# Patient Record
Sex: FEMALE | Race: WHITE | Employment: OTHER | URBAN - METROPOLITAN AREA
[De-identification: names, ages, dates, MRNs, and addresses within clinical notes are randomized per-mention and may not be internally consistent; named-entity substitution may affect disease eponyms.]

---

## 2017-06-22 PROBLEM — F43.21 UNRESOLVED GRIEF: Status: ACTIVE | Noted: 2017-06-22

## 2017-10-03 PROBLEM — F33.1 MODERATE EPISODE OF RECURRENT MAJOR DEPRESSIVE DISORDER (HCC): Status: ACTIVE | Noted: 2017-10-03

## 2017-10-03 PROBLEM — F43.22 ADJUSTMENT DISORDER WITH ANXIOUS MOOD: Status: ACTIVE | Noted: 2017-10-03

## 2018-02-25 PROBLEM — E66.9 OBESITY, CLASS I, BMI 30-34.9: Status: ACTIVE | Noted: 2018-02-25

## 2018-02-25 PROBLEM — F33.41 RECURRENT MAJOR DEPRESSIVE DISORDER, IN PARTIAL REMISSION (HCC): Status: ACTIVE | Noted: 2018-02-25

## 2018-05-17 PROBLEM — E66.3 OVERWEIGHT (BMI 25.0-29.9): Status: ACTIVE | Noted: 2018-02-25

## 2019-11-15 ENCOUNTER — HOSPITAL ENCOUNTER (OUTPATIENT)
Dept: SURGERY | Age: 74
Discharge: HOME | End: 2019-11-15

## 2019-11-15 VITALS — BODY MASS INDEX: 30.82 KG/M2 | HEIGHT: 65 IN | WEIGHT: 185 LBS

## 2019-11-15 RX ORDER — METOPROLOL TARTRATE 25 MG/1
12.5 TABLET, FILM COATED ORAL EVERY 12 HOURS
COMMUNITY

## 2019-11-15 RX ORDER — FUROSEMIDE 20 MG/1
20 TABLET ORAL
COMMUNITY
End: 2021-02-16 | Stop reason: CLARIF

## 2019-11-15 RX ORDER — POTASSIUM CHLORIDE 750 MG/1
10 TABLET, FILM COATED, EXTENDED RELEASE ORAL
COMMUNITY

## 2019-11-15 NOTE — PERIOP NOTES
Patient verified name and . Order for consent not found in EHR. Type 2 surgery, PAT phone assessment complete. Orders not received. Labs per surgeon: no orders received. Labs per anesthesia protocol: Hgb, K+, creatinine to be done at appointment on 19  EKG: to be done at appointment on 19    Pt instructed to come to entrance A on 19 at 11:45 for lab work and EKG. Pt voiced an understanding. Last PCP office note dated 10/29/19 found in care everywhere if needed for anesthesia reference. Patient answered medical/surgical history questions at their best of ability. All prior to admission medications documented in [facility] Care. Patient instructed to take the following medications the day of surgery according to anesthesia guidelines with a small sip of water: xanax, buspar, aspirin, aricept, prozac, gabapentin, Norco if needed, synthroid, metoprolol and prilosec. Hold all vitamins/suppllements 7 days prior to surgery and NSAIDS 5 days prior to surgery. Prescription meds to hold:celebrex. Patient instructed on the following:  Arrive at A Entrance, time of arrival to be called the day before by 1700  NPO after midnight including gum, mints, and ice chips  Responsible adult must drive patient to the hospital, stay during surgery, and patient will need supervision 24 hours after anesthesia  Use antibacterial soap in shower the night before surgery and on the morning of surgery  All piercings must be removed prior to arrival.    Leave all valuables (money and jewelry) at home but bring insurance card and ID on  DOS. Do not wear make-up, nail polish, lotions, cologne, perfumes, powders, or oil on skin. Patient teach back successful and patient demonstrates knowledge of instruction.

## 2019-11-20 ENCOUNTER — HOSPITAL ENCOUNTER (OUTPATIENT)
Dept: SURGERY | Age: 74
Discharge: HOME | End: 2019-11-20

## 2019-11-20 NOTE — PERIOP NOTES
Pt no show for lab and EKG appt loulou for 1145 today . Message left on home # to return call to PAT to reschedule.

## 2021-02-16 ENCOUNTER — HOME HEALTH ADMISSION (OUTPATIENT)
Dept: HOME HEALTH SERVICES | Facility: HOME HEALTH | Age: 76
End: 2021-02-16
Payer: MEDICARE

## 2021-02-16 ENCOUNTER — HOSPITAL ENCOUNTER (OUTPATIENT)
Dept: PHYSICAL THERAPY | Age: 76
Discharge: HOME | End: 2021-02-16
Attending: ORTHOPAEDIC SURGERY
Payer: MEDICARE

## 2021-02-16 ENCOUNTER — HOSPITAL ENCOUNTER (OUTPATIENT)
Dept: SURGERY | Age: 76
Discharge: HOME | End: 2021-02-16
Attending: ORTHOPAEDIC SURGERY
Payer: MEDICARE

## 2021-02-16 LAB
ANION GAP SERPL CALC-SCNC: 3 MMOL/L (ref 7–16)
APTT PPP: 24.6 SEC (ref 24.1–35.1)
ATRIAL RATE: 62 BPM
BACTERIA SPEC CULT: ABNORMAL
BASOPHILS # BLD: 0.1 K/UL (ref 0–0.2)
BASOPHILS NFR BLD: 1 % (ref 0–2)
BUN SERPL-MCNC: 22 MG/DL (ref 8–23)
CALCIUM SERPL-MCNC: 9.1 MG/DL (ref 8.3–10.4)
CALCULATED P AXIS, ECG09: 62 DEGREES
CALCULATED R AXIS, ECG10: 10 DEGREES
CALCULATED T AXIS, ECG11: 59 DEGREES
CHLORIDE SERPL-SCNC: 109 MMOL/L (ref 98–107)
CO2 SERPL-SCNC: 31 MMOL/L (ref 21–32)
CREAT SERPL-MCNC: 0.96 MG/DL (ref 0.6–1)
DIAGNOSIS, 93000: NORMAL
DIFFERENTIAL METHOD BLD: NORMAL
EOSINOPHIL # BLD: 0.2 K/UL (ref 0–0.8)
EOSINOPHIL NFR BLD: 4 % (ref 0.5–7.8)
ERYTHROCYTE [DISTWIDTH] IN BLOOD BY AUTOMATED COUNT: 12.3 % (ref 11.9–14.6)
EST. AVERAGE GLUCOSE BLD GHB EST-MCNC: 117 MG/DL
GLUCOSE SERPL-MCNC: 101 MG/DL (ref 65–100)
HBA1C MFR BLD: 5.7 % (ref 4.2–6.3)
HCT VFR BLD AUTO: 39.4 % (ref 35.8–46.3)
HGB BLD-MCNC: 12.8 G/DL (ref 11.7–15.4)
IMM GRANULOCYTES # BLD AUTO: 0 K/UL (ref 0–0.5)
IMM GRANULOCYTES NFR BLD AUTO: 0 % (ref 0–5)
INR PPP: 1.1
LYMPHOCYTES # BLD: 1.7 K/UL (ref 0.5–4.6)
LYMPHOCYTES NFR BLD: 26 % (ref 13–44)
MCH RBC QN AUTO: 30.4 PG (ref 26.1–32.9)
MCHC RBC AUTO-ENTMCNC: 32.5 G/DL (ref 31.4–35)
MCV RBC AUTO: 93.6 FL (ref 79.6–97.8)
MONOCYTES # BLD: 0.5 K/UL (ref 0.1–1.3)
MONOCYTES NFR BLD: 7 % (ref 4–12)
NEUTS SEG # BLD: 4 K/UL (ref 1.7–8.2)
NEUTS SEG NFR BLD: 62 % (ref 43–78)
NRBC # BLD: 0 K/UL (ref 0–0.2)
P-R INTERVAL, ECG05: 142 MS
PLATELET # BLD AUTO: 212 K/UL (ref 150–450)
PMV BLD AUTO: 12 FL (ref 9.4–12.3)
POTASSIUM SERPL-SCNC: 3.9 MMOL/L (ref 3.5–5.1)
PROTHROMBIN TIME: 14.6 SEC (ref 12.5–14.7)
Q-T INTERVAL, ECG07: 428 MS
QRS DURATION, ECG06: 88 MS
QTC CALCULATION (BEZET), ECG08: 434 MS
RBC # BLD AUTO: 4.21 M/UL (ref 4.05–5.2)
SERVICE CMNT-IMP: ABNORMAL
SODIUM SERPL-SCNC: 143 MMOL/L (ref 136–145)
VENTRICULAR RATE, ECG03: 62 BPM
WBC # BLD AUTO: 6.5 K/UL (ref 4.3–11.1)

## 2021-02-16 PROCEDURE — 80048 BASIC METABOLIC PNL TOTAL CA: CPT

## 2021-02-16 PROCEDURE — 97161 PT EVAL LOW COMPLEX 20 MIN: CPT

## 2021-02-16 PROCEDURE — 85025 COMPLETE CBC W/AUTO DIFF WBC: CPT

## 2021-02-16 PROCEDURE — 85730 THROMBOPLASTIN TIME PARTIAL: CPT

## 2021-02-16 PROCEDURE — 93005 ELECTROCARDIOGRAM TRACING: CPT

## 2021-02-16 PROCEDURE — 83036 HEMOGLOBIN GLYCOSYLATED A1C: CPT

## 2021-02-16 PROCEDURE — 85610 PROTHROMBIN TIME: CPT

## 2021-02-16 PROCEDURE — 87641 MR-STAPH DNA AMP PROBE: CPT

## 2021-02-16 PROCEDURE — 77030027138 HC INCENT SPIROMETER -A

## 2021-02-16 PROCEDURE — 36415 COLL VENOUS BLD VENIPUNCTURE: CPT

## 2021-02-16 RX ORDER — FUROSEMIDE 40 MG/1
40 TABLET ORAL
COMMUNITY
End: 2021-11-02

## 2021-02-16 RX ORDER — GABAPENTIN 800 MG/1
800 TABLET ORAL 3 TIMES DAILY
Status: ON HOLD | COMMUNITY
End: 2021-11-02 | Stop reason: SDUPTHER

## 2021-02-16 RX ORDER — LOSARTAN POTASSIUM 25 MG/1
25 TABLET ORAL DAILY
COMMUNITY

## 2021-02-16 RX ORDER — CHOLECALCIFEROL (VITAMIN D3) 125 MCG
10 CAPSULE ORAL
COMMUNITY

## 2021-02-16 RX ORDER — PRAVASTATIN SODIUM 80 MG/1
80 TABLET ORAL
COMMUNITY

## 2021-02-16 RX ORDER — HYDROCODONE BITARTRATE AND ACETAMINOPHEN 7.5; 325 MG/1; MG/1
1 TABLET ORAL
COMMUNITY
End: 2021-03-13

## 2021-02-16 RX ORDER — FENOFIBRIC ACID 135 MG/1
135 CAPSULE, DELAYED RELEASE ORAL DAILY
COMMUNITY

## 2021-02-16 NOTE — PERIOP NOTES
How to Use Your Incentive Spirometer (10 breaths twice a day starting at least a week prior to surgery)       About Your Incentive Spirometer  An incentive spirometer is a device that will expand your lungs by helping you to breathe more deeply and fully. The parts of your incentive spirometer are labeled in Figure 1. Using your incentive spirometer  When youre using your incentive spirometer, make sure to breathe through your mouth. If you breathe through your nose, the incentive spirometer wont work properly. You can hold your nose if you have trouble. DO NOT BLOW INTO THE DEVICE. If you feel dizzy at any time, stop and rest. Try again at a later time. 1. Sit upright in a chair or in bed. Hold the incentive spirometer at eye level. 2. Put the mouthpiece in your mouth and close your lips tightly around it. Slowly breathe out (exhale) completely. 3. Breathe in (inhale) slowly through your mouth as deeply as you can. As you take the breath, you will see the piston rise inside the large column. While the piston rises, the indicator on the right should move upwards. It should stay in between the 2 arrows (see Figure 1). 4. Try to get the piston as high as you can, while keeping the indicator between the arrows. If the indicator doesnt stay between the arrows, youre breathing either too fast or too slow. 5. When you get it as high as you can, hold your breath for 10 seconds, or as long as possible. While youre holding your breath, the piston will slowly fall to the base of the spirometer. 6. Once the piston reaches the bottom of the spirometer, breathe out slowly through your mouth. Rest for a few seconds. 7. Repeat 10 times. Try to get the piston to the same level with each breath. 8. After each set of 10 breaths, try to cough as coughing will help loosen or clear any mucus in your lungs. 9. Put the marker at the level the piston reached on your incentive spirometer.  This will be your goal next time.  Repeat these steps every hour that youre awake.   Cover the mouthpiece of the incentive spirometer when you arent using it

## 2021-02-16 NOTE — PERIOP NOTES
PLEASE CONTINUE TAKING ALL PRESCRIPTION MEDICATIONS UP TO THE DAY OF SURGERY UNLESS OTHERWISE DIRECTED BELOW. DISCONTINUE all vitamins and supplements 21 days prior to surgery. DISCONTINUE Non-Steriodal Anti-Inflammatory (NSAIDS) such as Advil, Ibuprofen, Motrin, Aspirin, Naproxen, and Aleve FIVE days prior to surgery. Home Medications to take  the day of surgery (3/11/21)     1-Xanax if needed   2-Buspirone  3-Fluoxetine  4-Gabapentin    5-Levothyroxine  6-Metoprolol              Home Medications   to Hold     Donepezil=stop 2 weeks prior (last dose on 2/24/21)        Comments   Covid test 3/4/21 @ [address], Virk TracichesterMay take Tylenol up until the day before surgery if needed   *Visitor policy of 1 visitor per patient discussed. Bring: Jodie-hex soap, Incentive Spirometer, Photo ID, Insurance card, Fenofibric        Please do not bring home medications with you on the day of surgery unless otherwise directed by your nurse. If you are instructed to bring home medications, please give them to your nurse as they will be administered by the nursing staff. If you have any questions, please call Keyona Adame [phone]. A copy of this note was provided to the patient for reference.

## 2021-02-16 NOTE — PERIOP NOTES
Patient verified name and . Patient's son is with her today. Order for consent found in EHR and matches case posting; patient verified. Type 3 surgery, walk in joint camp assessment complete. Labs per surgeon: CBC, BMP, PT/PTT, HGB-A1C ; results within anesthesia protocols. Labs per anesthesia protocol: no additional labs needed. EKG: completed today; within anesthesia protocols. PCP note (2020), [facility] note (16), and Echo (16) available in EHR for reference if needed. Patient COVID test date 3/4/21; Patient aware. The testing center [address] provided to the patient. MRSA/MSSA swab collected; pharmacy to review and dose antibiotic as appropriate. Hospital approved surgical skin cleanser and instructions to return bottle on DOS given per hospital policy. Patient provided with handouts including Guide to Surgery, Pain Management, Hand Hygiene, Blood Transfusion Education, and [facility] Brochure. Patient answered medical/surgical history questions at their best of ability. All prior to admission medications documented in [facility]. Original medication prescription bottles visualized during patient appointment. Patient instructed to hold all vitamins 3 weeks prior to surgery and NSAIDS 5 days prior to surgery. Patient teach back successful and patient demonstrates knowledge of instruction.

## 2021-02-16 NOTE — PROGRESS NOTES
Joint Camp Case Management note:  Patient screened in Prehab for discharge planning needs. Patient scheduled for a future total joint replacement. We discussed Home Health and equipment needed after surgery. List of Home Health providers offered. Patient w/o preference towards provider. Initial referral sent to [facility]. Has a walker but will need a 3-1 BSC.

## 2021-02-16 NOTE — PERIOP NOTES
Jeff Aponte MD     Your patient recently had labs drawn during a hospital appointment due to an upcoming surgery. The results are attached. If you have any questions or concerns please reach out to your patient for a follow-up in your office. Please do not respond to this message as my mailbox is not monitored. You may call [phone] with questions or concerns. Recent Results (from the past 12 hour(s))   CBC WITH AUTOMATED DIFF    Collection Time: 02/16/21 11:03 AM   Result Value Ref Range    WBC 6.5 4.3 - 11.1 K/uL    RBC 4.21 4.05 - 5.2 M/uL    HGB 12.8 11.7 - 15.4 g/dL    HCT 39.4 35.8 - 46.3 %    MCV 93.6 79.6 - 97.8 FL    MCH 30.4 26.1 - 32.9 PG    MCHC 32.5 31.4 - 35.0 g/dL    RDW 12.3 11.9 - 14.6 %    PLATELET 977 824 - 397 K/uL    MPV 12.0 9.4 - 12.3 FL    ABSOLUTE NRBC 0.00 0.0 - 0.2 K/uL    DF AUTOMATED      NEUTROPHILS 62 43 - 78 %    LYMPHOCYTES 26 13 - 44 %    MONOCYTES 7 4.0 - 12.0 %    EOSINOPHILS 4 0.5 - 7.8 %    BASOPHILS 1 0.0 - 2.0 %    IMMATURE GRANULOCYTES 0 0.0 - 5.0 %    ABS. NEUTROPHILS 4.0 1.7 - 8.2 K/UL    ABS. LYMPHOCYTES 1.7 0.5 - 4.6 K/UL    ABS. MONOCYTES 0.5 0.1 - 1.3 K/UL    ABS. EOSINOPHILS 0.2 0.0 - 0.8 K/UL    ABS. BASOPHILS 0.1 0.0 - 0.2 K/UL    ABS. IMM.  GRANS. 0.0 0.0 - 0.5 K/UL   HEMOGLOBIN A1C WITH EAG    Collection Time: 02/16/21 11:03 AM   Result Value Ref Range    Hemoglobin A1c 5.7 4.20 - 6.30 %    Est. average glucose 259 mg/dL   METABOLIC PANEL, BASIC    Collection Time: 02/16/21 11:03 AM   Result Value Ref Range    Sodium 143 136 - 145 mmol/L    Potassium 3.9 3.5 - 5.1 mmol/L    Chloride 109 (H) 98 - 107 mmol/L    CO2 31 21 - 32 mmol/L    Anion gap 3 (L) 7 - 16 mmol/L    Glucose 101 (H) 65 - 100 mg/dL    BUN 22 8 - 23 MG/DL    Creatinine 0.96 0.6 - 1.0 MG/DL    GFR est AA >60 >60 ml/min/1.73m2    GFR est non-AA >60 >60 ml/min/1.73m2    Calcium 9.1 8.3 - 10.4 MG/DL   PROTHROMBIN TIME + INR    Collection Time: 02/16/21 11:03 AM   Result Value Ref Range    Prothrombin time 14.6 12.5 - 14.7 sec    INR 1.1     PTT    Collection Time: 02/16/21 11:03 AM   Result Value Ref Range    aPTT 24.6 24.1 - 35.1 SEC   EKG, 12 LEAD, INITIAL    Collection Time: 02/16/21  1:07 PM   Result Value Ref Range    Ventricular Rate 62 BPM    Atrial Rate 62 BPM    P-R Interval 142 ms    QRS Duration 88 ms    Q-T Interval 428 ms    QTC Calculation (Bezet) 434 ms    Calculated P Axis 62 degrees    Calculated R Axis 10 degrees    Calculated T Axis 59 degrees    Diagnosis       Normal sinus rhythm  Nonspecific T wave abnormality  Abnormal ECG  When compared with ECG of 02-AUG-2010 12:51,  Vent.  rate has decreased BY  30 BPM

## 2021-02-16 NOTE — ADVANCED PRACTICE NURSE
Total Joint Surgery Preoperative Chart Review      Patient ID:  Johnathan Piper  [id number]  56 y.o.  1945  Surgeon: Dr. Zachary Barahona  Date of Surgery: 3/11/2021  Procedure: Total Left Hip Arthroplasty  Primary Care Physician: Stefani, Not On File None  Specialty Physician(s):      Subjective:   Johnathan Piper is a 67 y.o. WHITE OR  female who presents for preoperative evaluation for Total Left Hip arthroplasty. This is a preoperative chart review note based on data collected by the nurse at the surgical Pre-Assessment visit. Past Medical History:   Diagnosis Date    Arthritis     osteo-- all over    Atrial fibrillation ([facility] 2016    per cardio note dated 8/16/16= had a single episode of paroxysmal atrial fibrillation in hospital under stress. No recurrence. CHADS2-vasc of 1. NO change in Rx. Continue BBlocker. No need for anticoag.  BMI 31.0-31.9,adult     Cataract of both eyes     CHF (congestive heart failure) ([facility]     Per PCP office note dated 12/08/2020;  lasix as needed    Chronic pain     back pain    Dementia ([facility]     per PCP office note dated 12/08/2020, aricept daily. Pt states she tends to forget names and has trouble remenbering names of certain items.     Generalized anxiety disorder 11/17/2016    GERD (gastroesophageal reflux disease)     hiatal hernia, managed with otc meds prn     High blood pressure     on med for control     High cholesterol     on med for control    Insomnia secondary to anxiety 11/17/2016    Irregular heart beat     Left knee pain     Major depressive disorder, recurrent ([facility] 11/17/2016    managed with meds     Neuropathy     Bilateral feet     Poor historian     Prediabetes     no meds or bs checks at home; A1C=5.7 on 02/16/21    PUD (peptic ulcer disease)     hiatal hernia; denies ulcers     Thyroid disease     hypo; on med for control     Vitamin B 12 deficiency       Past Surgical History:   Procedure Laterality Date    HX BACK SURGERY      spinal cord stimulator placed and then removed     HX HYSTERECTOMY      HX KNEE REPLACEMENT Right     HX KNEE REPLACEMENT Left     HX LAP CHOLECYSTECTOMY       Family History   Problem Relation Age of Onset    Cancer Mother     Heart Attack Father     Hypertension Father     Kidney Disease Father     Tuberculosis Father       Social History     Tobacco Use    Smoking status: Never Smoker    Smokeless tobacco: Never Used   Substance Use Topics    Alcohol use: No       Prior to Admission medications    Medication Sig Start Date End Date Taking? Authorizing Provider   gabapentin (NEURONTIN) 800 mg tablet Take 800 mg by mouth three (3) times daily. Yes Provider, Historical   losartan (COZAAR) 25 mg tablet Take 25 mg by mouth daily. Indications: high blood pressure   Yes Provider, Historical   pravastatin (PRAVACHOL) 80 mg tablet Take 80 mg by mouth nightly. Yes Provider, Historical   HYDROcodone-acetaminophen (Norco) 7.5-325 mg per tablet Take 1 Tab by mouth three (3) times daily as needed for Pain. Indications: pain   Yes Provider, Historical   melatonin 5 mg tablet Take 10 mg by mouth nightly. Yes Provider, Historical   furosemide (Lasix) 40 mg tablet Take 40 mg by mouth daily as needed. Yes Provider, Historical   fenofibric acid (TRILIPIX ER) 135 mg capsule Take 135 mg by mouth daily. Yes Provider, Historical   cholecalciferol, vitamin D3, (VITAMIN D3 PO) Take 1 Tab by mouth daily. Yes Provider, Historical   busPIRone (BUSPAR) 10 mg tablet Take 2 Tabs by mouth three (3) times daily. 12/9/20  Yes Whit Wilkerson MD   mirtazapine (REMERON) 30 mg tablet Take 1 Tab by mouth nightly. 12/9/20  Yes Whit Wilkerson MD   ALPRAZolam Kenyon Nasuti) 0.5 mg tablet Take 1 Tab by mouth daily as needed for Anxiety. 12/9/20  Yes Whit Wilkerson MD   FLUoxetine (PROzac) 20 mg capsule Take 1 Cap by mouth every morning.  12/9/20  Yes Whit Wilkerson MD   [facility] 4 mg/actuation nasal spray ADMINISTER A SINGLE SPRAY IN ONE NOSTRIL UPON SIGNS OF OPIOID OVERDOSE. CALL 911. REPEAT AFTER 3 MINUTES IF NO RESPONSE. 11/1/19  Yes Provider, Historical   flaxseed oil (OMEGA 3 PO) Take 1 Tab by mouth daily. Yes Provider, Historical   potassium chloride SR (KLOR-CON 10) 10 mEq tablet Take 10 mEq by mouth daily as needed. Yes Provider, Historical   metoprolol tartrate (LOPRESSOR) 25 mg tablet Take 12.5 mg by mouth every twelve (12) hours. Yes Provider, Historical   donepezil (ARICEPT) 10 mg tablet Take 10 mg by mouth nightly. 10/26/18  Yes Provider, Historical   ascorbic acid, vitamin C, (VITAMIN C) 500 mg tablet Take 1 Tab by mouth daily. Yes Provider, Historical   aspirin delayed-release 81 mg tablet Take 81 mg by mouth daily. Yes Provider, Historical   glucose blood VI test strips (ASCENSIA AUTODISC VI, ONE TOUCH ULTRA TEST VI) strip by Does Not Apply route. 6/14/17  Yes Provider, Historical   celecoxib (CELEBREX) 100 mg capsule Take 100 mg by mouth two (2) times a day. 7/17/17  Yes Provider, Historical   cyanocobalamin (VITAMIN B12) 500 mcg tablet Take 1 Tab by mouth daily. Yes Provider, Historical   ferrous sulfate 325 mg (65 mg iron) tablet Take 1 Tab by mouth Three (3) times a week. Yes Provider, Historical   lancets 30 gauge misc by Does Not Apply route. 4/18/16  Yes Provider, Historical   levothyroxine (SYNTHROID) 75 mcg tablet Take 75 mcg by mouth Daily (before breakfast). 5/30/17  Yes Provider, Historical   miscellaneous medical supply misc Diabetic shoes 7/11/17  Yes Provider, Historical   nitroglycerin (NITROSTAT) 0.4 mg SL tablet 0.4 mg by SubLINGual route every five (5) minutes as needed.  7/8/16  Yes Provider, Historical     Allergies   Allergen Reactions    Morphine Nausea Only    Oxycodone Nausea and Vomiting          Objective:     Physical Exam:   Patient Vitals for the past 24 hrs:   Temp Pulse BP SpO2   02/16/21 1206 97.3 °F (36.3 °C) 60 (!) 141/55 93 %       ECG:    EKG Results     Procedure 720 Value Units Date/Time    EKG, 12 LEAD, INITIAL [[id number]] Collected: 02/16/21 1307    Order Status: Completed Updated: 02/16/21 1442     Ventricular Rate 62 BPM      Atrial Rate 62 BPM      P-R Interval 142 ms      QRS Duration 88 ms      Q-T Interval 428 ms      QTC Calculation (Bezet) 434 ms      Calculated P Axis 62 degrees      Calculated R Axis 10 degrees      Calculated T Axis 59 degrees      Diagnosis --     Normal sinus rhythm  Nonspecific T wave abnormality  Abnormal ECG  When compared with ECG of 02-AUG-2010 12:51,  Vent. rate has decreased BY  30 BPM  Confirmed by Adele Serrano MD (), Navneet Oliveira ([id number]) on 2/16/2021 2:42:26 PM            Data Review:   Labs:   Recent Results (from the past 24 hour(s))   CBC WITH AUTOMATED DIFF    Collection Time: 02/16/21 11:03 AM   Result Value Ref Range    WBC 6.5 4.3 - 11.1 K/uL    RBC 4.21 4.05 - 5.2 M/uL    HGB 12.8 11.7 - 15.4 g/dL    HCT 39.4 35.8 - 46.3 %    MCV 93.6 79.6 - 97.8 FL    MCH 30.4 26.1 - 32.9 PG    MCHC 32.5 31.4 - 35.0 g/dL    RDW 12.3 11.9 - 14.6 %    PLATELET 946 590 - 636 K/uL    MPV 12.0 9.4 - 12.3 FL    ABSOLUTE NRBC 0.00 0.0 - 0.2 K/uL    DF AUTOMATED      NEUTROPHILS 62 43 - 78 %    LYMPHOCYTES 26 13 - 44 %    MONOCYTES 7 4.0 - 12.0 %    EOSINOPHILS 4 0.5 - 7.8 %    BASOPHILS 1 0.0 - 2.0 %    IMMATURE GRANULOCYTES 0 0.0 - 5.0 %    ABS. NEUTROPHILS 4.0 1.7 - 8.2 K/UL    ABS. LYMPHOCYTES 1.7 0.5 - 4.6 K/UL    ABS. MONOCYTES 0.5 0.1 - 1.3 K/UL    ABS. EOSINOPHILS 0.2 0.0 - 0.8 K/UL    ABS. BASOPHILS 0.1 0.0 - 0.2 K/UL    ABS. IMM.  GRANS. 0.0 0.0 - 0.5 K/UL   HEMOGLOBIN A1C WITH EAG    Collection Time: 02/16/21 11:03 AM   Result Value Ref Range    Hemoglobin A1c 5.7 4.20 - 6.30 %    Est. average glucose 360 mg/dL   METABOLIC PANEL, BASIC    Collection Time: 02/16/21 11:03 AM   Result Value Ref Range    Sodium 143 136 - 145 mmol/L    Potassium 3.9 3.5 - 5.1 mmol/L    Chloride 109 (H) 98 - 107 mmol/L    CO2 31 21 - 32 mmol/L    Anion gap 3 (L) 7 - 16 mmol/L    Glucose 101 (H) 65 - 100 mg/dL    BUN 22 8 - 23 MG/DL    Creatinine 0.96 0.6 - 1.0 MG/DL    GFR est AA >60 >60 ml/min/1.73m2    GFR est non-AA >60 >60 ml/min/1.73m2    Calcium 9.1 8.3 - 10.4 MG/DL   PROTHROMBIN TIME + INR    Collection Time: 02/16/21 11:03 AM   Result Value Ref Range    Prothrombin time 14.6 12.5 - 14.7 sec    INR 1.1     PTT    Collection Time: 02/16/21 11:03 AM   Result Value Ref Range    aPTT 24.6 24.1 - 35.1 SEC   EKG, 12 LEAD, INITIAL    Collection Time: 02/16/21  1:07 PM   Result Value Ref Range    Ventricular Rate 62 BPM    Atrial Rate 62 BPM    P-R Interval 142 ms    QRS Duration 88 ms    Q-T Interval 428 ms    QTC Calculation (Bezet) 434 ms    Calculated P Axis 62 degrees    Calculated R Axis 10 degrees    Calculated T Axis 59 degrees    Diagnosis       Normal sinus rhythm  Nonspecific T wave abnormality  Abnormal ECG  When compared with ECG of 02-AUG-2010 12:51,  Vent. rate has decreased BY  30 BPM  Confirmed by [facility] Hoolehua  MD (), Angelica Sales ([id number]) on 2/16/2021 2:42:26 PM           Problem List:  )  Patient Active Problem List   Diagnosis Code    Primary insomnia F51.01    Generalized anxiety disorder F41.1    Unresolved grief F43.21    Adjustment disorder with anxious mood F43.22    Rec[facility] F33.41    Overweight (BMI 25.0-29. 9) E66.3       Total Joint Surgery Pre-Assessment Recommendations:           Recommend continuous saturation monitoring hours of sleep, during hospitalization. O2 prn per respiratory protocol.        Signed By: Raffy Gold, NP-C    February 16, 2021

## 2021-02-16 NOTE — PROGRESS NOTES
Tim Patel  : [birth year](34 y.o.) [facility] [address]  [phone]       Physical Therapy Prehab Plan of Treatment and Evaluation Summary:2021    ICD-10: Treatment Diagnosis:   · Pain in left hip (M25.552)  · Stiffness of Left Hip, Not elsewhere classified (M25.652)  Precautions/Allergies:   Morphine and Oxycodone  MEDICAL/REFERRING DIAGNOSIS:  Unilateral primary osteoarthritis, left hip [M16.12]  REFERRING PHYSICIAN: Gisele Henley,*  DATE OF SURGERY: 3/11/21    Assessment:   Comments:  She is here with her son., She lives alone and is having a L JAMIL. She has had a L TKA. She is planning on going home and her son will offer assistance since she lives alone. PROBLEM LIST (Impacting functional limitations):  Ms. Patel presents with the following left lower extremity(s) problems:  1. Strength  2. Range of Motion  3. Home Exercise Program  4. Pain   INTERVENTIONS PLANNED:  1. Home Exercise Program  2. Educational Discussion      TREATMENT PLAN: Effective Dates: 2021 TO 2021. Frequency/Duration: Patient to continue to perform home exercise program at least twice per day up until her surgery. GOALS: (Goals have been discussed and agreed upon with patient.)  Discharge Goals: Time Frame: 1 Day  1. Patient will demonstrate independence with a home exercise program designed to increase strength, range of motion and pain control to minimize functional deficits and optimize patient for total joint replacement. Rehabilitation Potential For Stated Goals: Good  Regarding Tim Stevens's therapy, I certify that the treatment plan above will be carried out by a therapist or under their direction.   Thank you for this referral,  Jenn Aguirre, PT               HISTORY:   Present Symptoms:  Pain Intensity 1: 6(at its worst)  Pain Location 1: Hip  Pain Orientation 1: Lateral, Posterior, Left   History of Present Injury/Illness (Reason for Referral):  Medical/Referring Diagnosis: Unilateral primary osteoarthritis, left hip [M16.12]   Past Medical History/Comorbidities:   Ms. Pierre Alex  has a past medical history of Arthritis, Atrial fibrillation ([facility] [address] Cataract of both eyes, CHF (congestive heart failure) ([facility] [address] Chronic pain, Dementia ([facility] [address] Generalized anxiety disorder (11/17/2016), GERD (gastroesophageal reflux disease), High blood pressure, High cholesterol, Insomnia secondary to anxiety (11/17/2016), Irregular heart beat, Left knee pain, Major depressive disorder, recurrent ([facility] [address] (11/17/2016), Neuropathy, Poor historian, Prediabetes, PUD (peptic ulcer disease), Thyroid disease, and Vitamin B 12 deficiency. Ms. Peirre Alex  has a past surgical history that includes hx hysterectomy; hx orthopaedic; hx lap cholecystectomy; and hx knee replacement (Left).   Social History/Living Environment:   Home Environment: Private residence  # Steps to Enter: 4  Hand Rails : Right  One/Two Story Residence: One story  Living Alone: Yes  Support Systems: Child(clari)(son)  Patient Expects to be Discharged to[de-identified] Private residence  Current DME Used/Available at Home: steven Harris Alban Layman, rolling, [address] [employer] chair  Tub or Shower Type: Tub/Shower combination  Work/Activity:  retired  Dominant Side:  RIGHT  Current Medications:  See [address] note   Number of Personal Factors/Comorbidities that affect the Plan of Care: 1-2: MODERATE COMPLEXITY   EXAMINATION:   ADLs (Current Functional Status):   Ambulation:  [x] Independent  [] Walk Indoors Only  [x] Walk Outdoors  [x] Use Assistive Device  [] Use Wheelchair Only Dressing:  [x] Independent  Requires Assistance from Someone for:  [] Sock/Shoes  [] Pants  [] Everything   Bathing/Showering:   [x] Independent  [] Requires Assistance from Someone  [] [address]:  [] Routine house and yard work  [x] Light Housework Only  [] None   Observation/Orthostatic Postural Assessment:       ROM/Flexibility:   AROM: Within functional limits(R LE)                LLE AROM  L Hip Flexion: 110  L Hip ABduction: 20          Strength:   Strength: Generally decreased, functional(R LE 4/5)       LLE Strength  L Hip Flexion: 3+  L Hip ABduction: 3+  L Knee Extension: 3+  L Ankle Dorsiflexion: 3+          Functional Mobility:    Sensation: Intact(R LE)    Stand to Sit: Independent  Sit to Stand: Independent  Stand Pivot Transfers: Modified independent  Distance (ft): 300 Feet (ft)  Ambulation - Level of Assistance: Modified independent  Assistive Device: Walker, rolling  Speed/Ekaterina: Pace decreased (<100 feet/min)  Step Length: Right shortened  Stance: Left decreased  Gait Abnormalities: Antalgic          Balance:    Sitting: Intact  Standing: Intact   Body Structures Involved:  1. Bones  2. Joints  3. Muscles Body Functions Affected:  1. Movement Related Activities and Participation Affected:  1. General Tasks and Demands  2. Mobility   Number of elements that affect the Plan of Care: 4+: HIGH COMPLEXITY   CLINICAL PRESENTATION:   Presentation: Stable and uncomplicated: LOW COMPLEXITY   CLINICAL DECISION MAKING:   Outcome Measure: Tool Used: Lower Extremity Functional Scale (LEFS)  Score:  Initial: 4/80 Most Recent: X/80 (Date: -- )   Interpretation of Score: 20 questions each scored on a 5 point scale with 0 representing \"extreme difficulty or unable to perform\" and 4 representing \"no difficulty\". The lower the score, the greater the functional disability. 80/80 represents no disability. Minimal detectable change is 9 points. Medical Necessity:   · Ms. Stevens is expected to optimize her lower extremity strength and ROM in preparation for joint replacement surgery. Reason for Services/Other Comments:  · Achieve baseline assesment of musculoskeletal system, functional mobility and home environment. , educate in PT HEP in preparation for surgery, educate in hospital plan of care.   Use of outcome tool(s) and clinical judgement create a POC that gives a: Clear prediction of patient's progress: LOW COMPLEXITY   TREATMENT:   Treatment/Session Assessment:  Patient was instructed in PT- HEP to increase strength and ROM in LEs. Answered all questions. · Post session pain:  3  · Compliance with Program/Exercises: compliant most of the time.   Total Treatment Duration:  PT Patient Time In/Time Out  Time In: 1015  Time Out: [address], [place]

## 2021-02-17 VITALS
DIASTOLIC BLOOD PRESSURE: 55 MMHG | OXYGEN SATURATION: 93 % | TEMPERATURE: 97.3 F | BODY MASS INDEX: 31.19 KG/M2 | HEIGHT: 65 IN | SYSTOLIC BLOOD PRESSURE: 141 MMHG | HEART RATE: 60 BPM | WEIGHT: 187.2 LBS

## 2021-02-17 NOTE — PROGRESS NOTES
02/16/21 1030   Oxygen Therapy   O2 Sat (%) 95 %   Pulse via Oximetry 64 beats per minute   O2 Device Room air   Pre-Treatment   Breath Sounds Bilateral Clear;Diminished   Pre FEV1 (liters) 0.9 liters   % Predicted 43  (POOR EFFORT- LEAK)     Initial respiratory Assessment completed with pt. Pt was interviewed and evaluated in [facility] prior to surgery. Patient ID:  Venu Benson  [id number]  71 y.o.  1945  Surgeon: Dr. Osito Dickson  Date of Surgery: 3/11/2021  Procedure: Total Left Hip Arthroplasty  Primary Care Physician: Stefani, Not On File None  Specialists:     Pt. IS SOMEWHAT PRACTICING SOCIAL DISTANCING AND WEARING A MASK WHEN IN PUBLIC. Pt taught proper COUGH technique  DIAPHRAGMATIC BREATHING EXERCISE INSTRUCTIONS GIVEN    History of smoking:   DENIES                 Quit date:         Secondhand smoke:PARENTS    Past procedures with Oxygen desaturation or delayed awakening:DENIES    Past Medical History:   Diagnosis Date    Arthritis     osteo-- all over    Atrial fibrillation ([facility] [address] 2016    per cardio note dated 8/16/16= had a single episode of paroxysmal atrial fibrillation in hospital under stress. No recurrence. CHADS2-vasc of 1. NO change in Rx. Continue BBlocker. No need for anticoag.  BMI 31.0-31.9,adult     Cataract of both eyes     CHF (congestive heart failure) ([address]     Per PCP office note dated 12/08/2020;  lasix as needed    Chronic pain     back pain    Dementia ([facility] [address]     per PCP office note dated 12/08/2020, aricept daily. Pt states she tends to forget names and has trouble remenbering names of certain items.     Generalized anxiety disorder 11/17/2016    GERD (gastroesophageal reflux disease)     hiatal hernia, managed with otc meds prn     High blood pressure     on med for control     High cholesterol     on med for control    Insomnia secondary to anxiety 11/17/2016    Irregular heart beat     Left knee pain     Major depressive disorder, recurrent ([address] 11/17/2016    managed with meds     Neuropathy     Bilateral feet     Poor historian     Prediabetes     no meds or bs checks at home; A1C=5.7 on 02/16/21    PUD (peptic ulcer disease)     hiatal hernia; denies ulcers     Thyroid disease     hypo; on med for control     Vitamin B 12 deficiency    DEMENTIA   OPACITY 5/23/2019 ON CHEST X-RAY  Respiratory history:DENIES SOB                                                                   Respiratory meds:  DENIES    FAMILY PRESENT:            SON                                                  PAST SLEEP STUDY:                          DENIES  HX OF MATILDA:                                        DENIES  MATILDA assessment:                                               SLEEPS ON     BACK         PHYSICAL EXAM   Body mass index is 31.15 kg/m².    Visit Vitals  BP (!) 141/55 (BP 1 Location: Left upper arm, BP Patient Position: At rest;Sitting)   Pulse 60   Temp 97.3 °F (36.3 °C)   Ht 5' 5\" (1.651 m)   Wt 84.9 kg (187 lb 3.2 oz)   SpO2 93%   BMI 31.15 kg/m²     Neck circumference: 41     cm    Loud snoring:                                                 YES            Witnessed apnea or wakening gasping or choking:        DENIES         Awakens with headaches:                                               DENIES  Morning or daytime tiredness/ sleepiness:                             TIRED- NOT SLEEPING WELL  Dry mouth or sore throat in morning:                      DENIES                                   Browning stage:  4                                   SACS score:18  Stop Bang   STOP-BANG  Does the patient snore loudly (louder than talking or loud enough to be heard through closed doors)?: Yes  Does the patient often feel tired, fatigued, or sleepy during the daytime, even after a \"good\" night's sleep?: Yes  Has anyone ever observed the patient stop breathing during their sleep? : No  Does the patient have or are they being treated for high blood pressure?: Yes  Is the patient's BMI greater than 35?: No  Is your neck circumference greater than 17 inches (Male) or 16 inches (Female)?: Yes  Is the patient older than 48?: Yes  Is the patient male?: No  MATILDA Score: 5  Has the patient been referred to Sleep Medicine?: No  Has the patient previously been diagnosed with Obstructive Sleep Apnea?: No                            CS HS                                        Referrals:  DECLINED  Pt.  Phone Number:

## 2021-03-05 NOTE — H&P
[address] [facility]  History and Physical Exam    Patient ID:  Soheila Obando  [id number]    39 y.o.  1945    Today: March 5, 2021    Vitals Signs: Reviewed as noted in medical record. Allergies: Allergies   Allergen Reactions    Morphine Nausea Only    Oxycodone Nausea and Vomiting       CC: Left hip pain    HPI:  Pt complains of left hip pain and difficulty ambulating. Relevant PMH:   Past Medical History:   Diagnosis Date    Arthritis     osteo-- all over    Atrial fibrillation ([facility] [address] 2016    per cardio note dated 8/16/16= had a single episode of paroxysmal atrial fibrillation in hospital under stress. No recurrence. CHADS2-vasc of 1. NO change in Rx. Continue BBlocker. No need for anticoag.  BMI 31.0-31.9,adult     Cataract of both eyes     CHF (congestive heart failure) ([facility] [address]     Per PCP office note dated 12/08/2020;  lasix as needed    Chronic pain     back pain    Dementia ([facility] [address]     per PCP office note dated 12/08/2020, aricept daily. Pt states she tends to forget names and has trouble remenbering names of certain items.     Generalized anxiety disorder 11/17/2016    GERD (gastroesophageal reflux disease)     hiatal hernia, managed with otc meds prn     High blood pressure     on med for control     High cholesterol     on med for control    Insomnia secondary to anxiety 11/17/2016    Irregular heart beat     Left knee pain     Major depressive disorder, recurrent ([facility] [address] 11/17/2016    managed with meds     Neuropathy     Bilateral feet     Poor historian     Prediabetes     no meds or bs checks at home; A1C=5.7 on 02/16/21    PUD (peptic ulcer disease)     hiatal hernia; denies ulcers     Thyroid disease     hypo; on med for control     Vitamin B 12 deficiency        Objective:                    HEENT: NC/AT                   Lungs:  clear                   Heart:   rrr                   Abdomen: soft                   Extremities:  Pain with rom of the left hip joint    Radiographs: reveal osteoarthritis with loss of joint space and bone spurs. Assessment: Primary osteoarthritis of left hip [M16.12]    Plan:  Proceed with scheduled Procedure(s) (LRB):  LEFT HIP ARTHROPLASTY TOTAL/DEPUY (Left) . The patient has failed conservative treatment including NSAIDS, and injections. Due to the amount of pain the patient is experiencing we will proceed with scheduled procedure. It is also felt that the patient is high risk for postoperative complication due to history of multiple chronic medical problems.   The patient may potentially spend 2 nights in the hospital.      Signed By: SANTOS Alan  March 5, 2021

## 2021-03-10 ENCOUNTER — ANESTHESIA EVENT (OUTPATIENT)
Dept: SURGERY | Age: 76
DRG: 470 | End: 2021-03-10
Payer: MEDICARE

## 2021-03-11 ENCOUNTER — ANESTHESIA (OUTPATIENT)
Dept: SURGERY | Age: 76
DRG: 470 | End: 2021-03-11
Payer: MEDICARE

## 2021-03-11 ENCOUNTER — APPOINTMENT (OUTPATIENT)
Dept: GENERAL RADIOLOGY | Age: 76
DRG: 470 | End: 2021-03-11
Attending: PHYSICIAN ASSISTANT
Payer: MEDICARE

## 2021-03-11 ENCOUNTER — HOSPITAL ENCOUNTER (INPATIENT)
Age: 76
LOS: 2 days | Discharge: HOME HEALTH | DRG: 470 | End: 2021-03-13
Attending: ORTHOPAEDIC SURGERY | Admitting: ORTHOPAEDIC SURGERY
Payer: MEDICARE

## 2021-03-11 DIAGNOSIS — Z96.642 STATUS POST TOTAL HIP REPLACEMENT, LEFT: Primary | ICD-10-CM

## 2021-03-11 DIAGNOSIS — M16.12 PRIMARY OSTEOARTHRITIS OF LEFT HIP: ICD-10-CM

## 2021-03-11 LAB
GLUCOSE BLD STRIP.AUTO-MCNC: 141 MG/DL (ref 65–100)
HGB BLD-MCNC: 10.5 G/DL (ref 11.7–15.4)

## 2021-03-11 PROCEDURE — 74011250637 HC RX REV CODE- 250/637: Performed by: PHYSICIAN ASSISTANT

## 2021-03-11 PROCEDURE — 85018 HEMOGLOBIN: CPT

## 2021-03-11 PROCEDURE — 74011000250 HC RX REV CODE- 250: Performed by: PHYSICIAN ASSISTANT

## 2021-03-11 PROCEDURE — 77030003665 HC NDL SPN BBMI -A: Performed by: ANESTHESIOLOGY

## 2021-03-11 PROCEDURE — 74011250636 HC RX REV CODE- 250/636: Performed by: ANESTHESIOLOGY

## 2021-03-11 PROCEDURE — 2709999900 HC NON-CHARGEABLE SUPPLY: Performed by: ORTHOPAEDIC SURGERY

## 2021-03-11 PROCEDURE — 77030040361 HC SLV COMPR DVT MDII -B

## 2021-03-11 PROCEDURE — 74011250636 HC RX REV CODE- 250/636: Performed by: ORTHOPAEDIC SURGERY

## 2021-03-11 PROCEDURE — 77030006835 HC BLD SAW SAG STRY -B: Performed by: ORTHOPAEDIC SURGERY

## 2021-03-11 PROCEDURE — 36415 COLL VENOUS BLD VENIPUNCTURE: CPT

## 2021-03-11 PROCEDURE — 74011000250 HC RX REV CODE- 250: Performed by: ANESTHESIOLOGY

## 2021-03-11 PROCEDURE — 76060000034 HC ANESTHESIA 1.5 TO 2 HR: Performed by: ORTHOPAEDIC SURGERY

## 2021-03-11 PROCEDURE — 94762 N-INVAS EAR/PLS OXIMTRY CONT: CPT

## 2021-03-11 PROCEDURE — 77030018074 HC RTVR SUT ARTH4 S&N -B: Performed by: ORTHOPAEDIC SURGERY

## 2021-03-11 PROCEDURE — 77030018673: Performed by: ORTHOPAEDIC SURGERY

## 2021-03-11 PROCEDURE — 77030012935 HC DRSG AQUACEL BMS -B: Performed by: ORTHOPAEDIC SURGERY

## 2021-03-11 PROCEDURE — 76010000162 HC OR TIME 1.5 TO 2 HR INTENSV-TIER 1: Performed by: ORTHOPAEDIC SURGERY

## 2021-03-11 PROCEDURE — 97161 PT EVAL LOW COMPLEX 20 MIN: CPT

## 2021-03-11 PROCEDURE — 27130 TOTAL HIP ARTHROPLASTY: CPT | Performed by: ORTHOPAEDIC SURGERY

## 2021-03-11 PROCEDURE — 77030020269 HC MISC IMPL: Performed by: ORTHOPAEDIC SURGERY

## 2021-03-11 PROCEDURE — 72170 X-RAY EXAM OF PELVIS: CPT

## 2021-03-11 PROCEDURE — 77030019557 HC ELECTRD VES SEAL MEDT -F: Performed by: ORTHOPAEDIC SURGERY

## 2021-03-11 PROCEDURE — 77030007880 HC KT SPN EPDRL BBMI -B: Performed by: ANESTHESIOLOGY

## 2021-03-11 PROCEDURE — 74011250636 HC RX REV CODE- 250/636: Performed by: PHYSICIAN ASSISTANT

## 2021-03-11 PROCEDURE — 97110 THERAPEUTIC EXERCISES: CPT

## 2021-03-11 PROCEDURE — 0SRB0JA REPLACEMENT OF LEFT HIP JOINT WITH SYNTHETIC SUBSTITUTE, UNCEMENTED, OPEN APPROACH: ICD-10-PCS | Performed by: ORTHOPAEDIC SURGERY

## 2021-03-11 PROCEDURE — 74011000250 HC RX REV CODE- 250: Performed by: NURSE ANESTHETIST, CERTIFIED REGISTERED

## 2021-03-11 PROCEDURE — 74011250636 HC RX REV CODE- 250/636: Performed by: NURSE ANESTHETIST, CERTIFIED REGISTERED

## 2021-03-11 PROCEDURE — 77030002966 HC SUT PDS J&J -A: Performed by: ORTHOPAEDIC SURGERY

## 2021-03-11 PROCEDURE — 97165 OT EVAL LOW COMPLEX 30 MIN: CPT

## 2021-03-11 PROCEDURE — 27130 TOTAL HIP ARTHROPLASTY: CPT | Performed by: PHYSICIAN ASSISTANT

## 2021-03-11 PROCEDURE — 82962 GLUCOSE BLOOD TEST: CPT

## 2021-03-11 PROCEDURE — 77030018547 HC SUT ETHBND1 J&J -B: Performed by: ORTHOPAEDIC SURGERY

## 2021-03-11 PROCEDURE — 76210000006 HC OR PH I REC 0.5 TO 1 HR: Performed by: ORTHOPAEDIC SURGERY

## 2021-03-11 PROCEDURE — 74011000258 HC RX REV CODE- 258: Performed by: ORTHOPAEDIC SURGERY

## 2021-03-11 PROCEDURE — C1776 JOINT DEVICE (IMPLANTABLE): HCPCS | Performed by: ORTHOPAEDIC SURGERY

## 2021-03-11 PROCEDURE — 74011000250 HC RX REV CODE- 250: Performed by: ORTHOPAEDIC SURGERY

## 2021-03-11 PROCEDURE — 74011250637 HC RX REV CODE- 250/637: Performed by: ANESTHESIOLOGY

## 2021-03-11 PROCEDURE — 77030002933 HC SUT MCRYL J&J -A: Performed by: ORTHOPAEDIC SURGERY

## 2021-03-11 PROCEDURE — 97535 SELF CARE MNGMENT TRAINING: CPT

## 2021-03-11 PROCEDURE — 94760 N-INVAS EAR/PLS OXIMETRY 1: CPT

## 2021-03-11 PROCEDURE — 2709999900 HC NON-CHARGEABLE SUPPLY

## 2021-03-11 PROCEDURE — 77030040922 HC BLNKT HYPOTHRM STRY -A: Performed by: ANESTHESIOLOGY

## 2021-03-11 PROCEDURE — 65270000029 HC RM PRIVATE

## 2021-03-11 DEVICE — SHELL ACET DIA50MM HIP BANTAM GRIPTION VIP TAPR DOME DSGN: Type: IMPLANTABLE DEVICE | Site: HIP | Status: FUNCTIONAL

## 2021-03-11 DEVICE — HEAD FEM DIA22.225MM +4MM OFFSET 12/14 TAPR HIP MTL ARTC EZ: Type: IMPLANTABLE DEVICE | Site: HIP | Status: FUNCTIONAL

## 2021-03-11 DEVICE — SCREW BNE L25MM DIA6.5MM CANC HIP S STL GRIPTION FULL THRD: Type: IMPLANTABLE DEVICE | Site: HIP | Status: FUNCTIONAL

## 2021-03-11 DEVICE — STEM FEM SZ 5 HIP STD OFFSET CLLRD CEMENTLESS 12/14 TAPR: Type: IMPLANTABLE DEVICE | Site: HIP | Status: FUNCTIONAL

## 2021-03-11 DEVICE — HIP H3 TOT ADV DUAL MOB IMPL CAPPED H3: Type: IMPLANTABLE DEVICE | Status: FUNCTIONAL

## 2021-03-11 RX ORDER — ROPIVACAINE HYDROCHLORIDE 2 MG/ML
INJECTION, SOLUTION EPIDURAL; INFILTRATION; PERINEURAL AS NEEDED
Status: DISCONTINUED | OUTPATIENT
Start: 2021-03-11 | End: 2021-03-11 | Stop reason: HOSPADM

## 2021-03-11 RX ORDER — HYDROMORPHONE HYDROCHLORIDE 1 MG/ML
1 INJECTION, SOLUTION INTRAMUSCULAR; INTRAVENOUS; SUBCUTANEOUS
Status: DISCONTINUED | OUTPATIENT
Start: 2021-03-11 | End: 2021-03-13 | Stop reason: HOSPADM

## 2021-03-11 RX ORDER — SODIUM CHLORIDE, SODIUM LACTATE, POTASSIUM CHLORIDE, CALCIUM CHLORIDE 600; 310; 30; 20 MG/100ML; MG/100ML; MG/100ML; MG/100ML
50 INJECTION, SOLUTION INTRAVENOUS CONTINUOUS
Status: DISCONTINUED | OUTPATIENT
Start: 2021-03-11 | End: 2021-03-11 | Stop reason: HOSPADM

## 2021-03-11 RX ORDER — ASPIRIN 81 MG/1
81 TABLET ORAL EVERY 12 HOURS
Status: DISCONTINUED | OUTPATIENT
Start: 2021-03-11 | End: 2021-03-13 | Stop reason: HOSPADM

## 2021-03-11 RX ORDER — SODIUM CHLORIDE 0.9 % (FLUSH) 0.9 %
5-40 SYRINGE (ML) INJECTION EVERY 8 HOURS
Status: DISCONTINUED | OUTPATIENT
Start: 2021-03-11 | End: 2021-03-13 | Stop reason: HOSPADM

## 2021-03-11 RX ORDER — DIPHENHYDRAMINE HCL 25 MG
25 CAPSULE ORAL
Status: DISCONTINUED | OUTPATIENT
Start: 2021-03-11 | End: 2021-03-13 | Stop reason: HOSPADM

## 2021-03-11 RX ORDER — DONEPEZIL HYDROCHLORIDE 5 MG/1
10 TABLET, FILM COATED ORAL
Status: DISCONTINUED | OUTPATIENT
Start: 2021-03-11 | End: 2021-03-13 | Stop reason: HOSPADM

## 2021-03-11 RX ORDER — ACETAMINOPHEN 500 MG
1000 TABLET ORAL EVERY 6 HOURS
Status: DISCONTINUED | OUTPATIENT
Start: 2021-03-11 | End: 2021-03-11

## 2021-03-11 RX ORDER — ONDANSETRON 4 MG/1
4 TABLET, ORALLY DISINTEGRATING ORAL
Status: DISCONTINUED | OUTPATIENT
Start: 2021-03-11 | End: 2021-03-13 | Stop reason: HOSPADM

## 2021-03-11 RX ORDER — GABAPENTIN 400 MG/1
800 CAPSULE ORAL 3 TIMES DAILY
Status: DISCONTINUED | OUTPATIENT
Start: 2021-03-11 | End: 2021-03-13 | Stop reason: HOSPADM

## 2021-03-11 RX ORDER — CEFAZOLIN SODIUM/WATER 2 G/20 ML
2 SYRINGE (ML) INTRAVENOUS ONCE
Status: COMPLETED | OUTPATIENT
Start: 2021-03-11 | End: 2021-03-11

## 2021-03-11 RX ORDER — METOPROLOL TARTRATE 25 MG/1
25 TABLET, FILM COATED ORAL EVERY 12 HOURS
Status: DISCONTINUED | OUTPATIENT
Start: 2021-03-11 | End: 2021-03-11

## 2021-03-11 RX ORDER — VANCOMYCIN HYDROCHLORIDE 1 G/20ML
INJECTION, POWDER, LYOPHILIZED, FOR SOLUTION INTRAVENOUS AS NEEDED
Status: DISCONTINUED | OUTPATIENT
Start: 2021-03-11 | End: 2021-03-11 | Stop reason: HOSPADM

## 2021-03-11 RX ORDER — DEXAMETHASONE SODIUM PHOSPHATE 100 MG/10ML
10 INJECTION INTRAMUSCULAR; INTRAVENOUS ONCE
Status: ACTIVE | OUTPATIENT
Start: 2021-03-12 | End: 2021-03-13

## 2021-03-11 RX ORDER — LOSARTAN POTASSIUM 25 MG/1
25 TABLET ORAL DAILY
Status: DISCONTINUED | OUTPATIENT
Start: 2021-03-12 | End: 2021-03-13 | Stop reason: HOSPADM

## 2021-03-11 RX ORDER — SODIUM CHLORIDE 0.9 % (FLUSH) 0.9 %
5-40 SYRINGE (ML) INJECTION AS NEEDED
Status: DISCONTINUED | OUTPATIENT
Start: 2021-03-11 | End: 2021-03-13 | Stop reason: HOSPADM

## 2021-03-11 RX ORDER — MIRTAZAPINE 15 MG/1
30 TABLET, FILM COATED ORAL
Status: DISCONTINUED | OUTPATIENT
Start: 2021-03-11 | End: 2021-03-13 | Stop reason: HOSPADM

## 2021-03-11 RX ORDER — MIDAZOLAM HYDROCHLORIDE 1 MG/ML
2 INJECTION, SOLUTION INTRAMUSCULAR; INTRAVENOUS
Status: DISCONTINUED | OUTPATIENT
Start: 2021-03-11 | End: 2021-03-11 | Stop reason: HOSPADM

## 2021-03-11 RX ORDER — SODIUM CHLORIDE 9 MG/ML
100 INJECTION, SOLUTION INTRAVENOUS CONTINUOUS
Status: DISPENSED | OUTPATIENT
Start: 2021-03-11 | End: 2021-03-13

## 2021-03-11 RX ORDER — ACETAMINOPHEN 500 MG
1000 TABLET ORAL ONCE
Status: COMPLETED | OUTPATIENT
Start: 2021-03-11 | End: 2021-03-11

## 2021-03-11 RX ORDER — KETOROLAC TROMETHAMINE 30 MG/ML
INJECTION, SOLUTION INTRAMUSCULAR; INTRAVENOUS AS NEEDED
Status: DISCONTINUED | OUTPATIENT
Start: 2021-03-11 | End: 2021-03-11 | Stop reason: HOSPADM

## 2021-03-11 RX ORDER — SODIUM CHLORIDE, SODIUM LACTATE, POTASSIUM CHLORIDE, CALCIUM CHLORIDE 600; 310; 30; 20 MG/100ML; MG/100ML; MG/100ML; MG/100ML
75 INJECTION, SOLUTION INTRAVENOUS CONTINUOUS
Status: DISCONTINUED | OUTPATIENT
Start: 2021-03-11 | End: 2021-03-11 | Stop reason: HOSPADM

## 2021-03-11 RX ORDER — OXYCODONE HYDROCHLORIDE 5 MG/1
5 TABLET ORAL
Status: DISCONTINUED | OUTPATIENT
Start: 2021-03-11 | End: 2021-03-11 | Stop reason: HOSPADM

## 2021-03-11 RX ORDER — LANOLIN ALCOHOL/MO/W.PET/CERES
1 CREAM (GRAM) TOPICAL 3 TIMES WEEKLY
Status: DISCONTINUED | OUTPATIENT
Start: 2021-03-12 | End: 2021-03-13 | Stop reason: HOSPADM

## 2021-03-11 RX ORDER — PROPOFOL 10 MG/ML
INJECTION, EMULSION INTRAVENOUS
Status: DISCONTINUED | OUTPATIENT
Start: 2021-03-11 | End: 2021-03-11 | Stop reason: HOSPADM

## 2021-03-11 RX ORDER — FLUOXETINE HYDROCHLORIDE 20 MG/1
20 CAPSULE ORAL DAILY
Status: DISCONTINUED | OUTPATIENT
Start: 2021-03-12 | End: 2021-03-13 | Stop reason: HOSPADM

## 2021-03-11 RX ORDER — MIDAZOLAM HYDROCHLORIDE 1 MG/ML
2 INJECTION, SOLUTION INTRAMUSCULAR; INTRAVENOUS ONCE
Status: DISCONTINUED | OUTPATIENT
Start: 2021-03-11 | End: 2021-03-11 | Stop reason: HOSPADM

## 2021-03-11 RX ORDER — ALBUTEROL SULFATE 0.83 MG/ML
2.5 SOLUTION RESPIRATORY (INHALATION) AS NEEDED
Status: DISCONTINUED | OUTPATIENT
Start: 2021-03-11 | End: 2021-03-11 | Stop reason: HOSPADM

## 2021-03-11 RX ORDER — ACETAMINOPHEN 650 MG/1
650 SUPPOSITORY RECTAL ONCE
Status: DISCONTINUED | OUTPATIENT
Start: 2021-03-11 | End: 2021-03-11

## 2021-03-11 RX ORDER — ACETAMINOPHEN 325 MG/1
975 TABLET ORAL ONCE
Status: DISCONTINUED | OUTPATIENT
Start: 2021-03-11 | End: 2021-03-11

## 2021-03-11 RX ORDER — EPHEDRINE SULFATE/0.9% NACL/PF 50 MG/5 ML
SYRINGE (ML) INTRAVENOUS AS NEEDED
Status: DISCONTINUED | OUTPATIENT
Start: 2021-03-11 | End: 2021-03-11 | Stop reason: HOSPADM

## 2021-03-11 RX ORDER — LIDOCAINE HYDROCHLORIDE 10 MG/ML
0.1 INJECTION INFILTRATION; PERINEURAL AS NEEDED
Status: DISCONTINUED | OUTPATIENT
Start: 2021-03-11 | End: 2021-03-11 | Stop reason: HOSPADM

## 2021-03-11 RX ORDER — AMOXICILLIN 250 MG
2 CAPSULE ORAL DAILY
Status: DISCONTINUED | OUTPATIENT
Start: 2021-03-12 | End: 2021-03-13 | Stop reason: HOSPADM

## 2021-03-11 RX ORDER — ALPRAZOLAM 0.5 MG/1
0.5 TABLET ORAL
Status: DISCONTINUED | OUTPATIENT
Start: 2021-03-11 | End: 2021-03-13 | Stop reason: HOSPADM

## 2021-03-11 RX ORDER — BUSPIRONE HYDROCHLORIDE 5 MG/1
10 TABLET ORAL 3 TIMES DAILY
Status: DISCONTINUED | OUTPATIENT
Start: 2021-03-11 | End: 2021-03-13 | Stop reason: HOSPADM

## 2021-03-11 RX ORDER — HYDROCODONE BITARTRATE AND ACETAMINOPHEN 7.5; 325 MG/1; MG/1
1 TABLET ORAL
Status: DISCONTINUED | OUTPATIENT
Start: 2021-03-11 | End: 2021-03-13 | Stop reason: HOSPADM

## 2021-03-11 RX ORDER — FENTANYL CITRATE 50 UG/ML
100 INJECTION, SOLUTION INTRAMUSCULAR; INTRAVENOUS ONCE
Status: DISCONTINUED | OUTPATIENT
Start: 2021-03-11 | End: 2021-03-11 | Stop reason: HOSPADM

## 2021-03-11 RX ORDER — NALOXONE HYDROCHLORIDE 0.4 MG/ML
.2-.4 INJECTION, SOLUTION INTRAMUSCULAR; INTRAVENOUS; SUBCUTANEOUS
Status: DISCONTINUED | OUTPATIENT
Start: 2021-03-11 | End: 2021-03-13 | Stop reason: HOSPADM

## 2021-03-11 RX ORDER — HYDROMORPHONE HYDROCHLORIDE 2 MG/ML
0.5 INJECTION, SOLUTION INTRAMUSCULAR; INTRAVENOUS; SUBCUTANEOUS
Status: DISCONTINUED | OUTPATIENT
Start: 2021-03-11 | End: 2021-03-11 | Stop reason: HOSPADM

## 2021-03-11 RX ORDER — LEVOTHYROXINE SODIUM 75 UG/1
75 TABLET ORAL
Status: DISCONTINUED | OUTPATIENT
Start: 2021-03-12 | End: 2021-03-13 | Stop reason: HOSPADM

## 2021-03-11 RX ORDER — METOPROLOL TARTRATE 25 MG/1
25 TABLET, FILM COATED ORAL DAILY
Status: DISCONTINUED | OUTPATIENT
Start: 2021-03-12 | End: 2021-03-13 | Stop reason: HOSPADM

## 2021-03-11 RX ORDER — FENOFIBRATE 160 MG/1
160 TABLET ORAL DAILY
Status: DISCONTINUED | OUTPATIENT
Start: 2021-03-12 | End: 2021-03-13 | Stop reason: HOSPADM

## 2021-03-11 RX ORDER — CEFAZOLIN SODIUM/WATER 2 G/20 ML
2 SYRINGE (ML) INTRAVENOUS EVERY 8 HOURS
Status: COMPLETED | OUTPATIENT
Start: 2021-03-11 | End: 2021-03-12

## 2021-03-11 RX ADMIN — CEFAZOLIN SODIUM 2 G: 100 INJECTION, POWDER, LYOPHILIZED, FOR SOLUTION INTRAVENOUS at 17:31

## 2021-03-11 RX ADMIN — PROPOFOL 75 MCG/KG/MIN: 10 INJECTION, EMULSION INTRAVENOUS at 10:45

## 2021-03-11 RX ADMIN — Medication 3 AMPULE: at 09:12

## 2021-03-11 RX ADMIN — LIDOCAINE HYDROCHLORIDE 0.1 ML: 10 INJECTION, SOLUTION INFILTRATION; PERINEURAL at 09:13

## 2021-03-11 RX ADMIN — ALPRAZOLAM 0.5 MG: 0.5 TABLET ORAL at 14:33

## 2021-03-11 RX ADMIN — BUSPIRONE HYDROCHLORIDE 10 MG: 5 TABLET ORAL at 21:41

## 2021-03-11 RX ADMIN — GABAPENTIN 800 MG: 400 CAPSULE ORAL at 17:32

## 2021-03-11 RX ADMIN — DONEPEZIL HYDROCHLORIDE 10 MG: 5 TABLET, FILM COATED ORAL at 21:40

## 2021-03-11 RX ADMIN — HYDROCODONE BITARTRATE AND ACETAMINOPHEN 1 TABLET: 7.5; 325 TABLET ORAL at 21:40

## 2021-03-11 RX ADMIN — Medication 10 MG: at 10:54

## 2021-03-11 RX ADMIN — GABAPENTIN 800 MG: 400 CAPSULE ORAL at 21:41

## 2021-03-11 RX ADMIN — HYDROCODONE BITARTRATE AND ACETAMINOPHEN 1 TABLET: 7.5; 325 TABLET ORAL at 14:33

## 2021-03-11 RX ADMIN — CEFAZOLIN 2 G: 1 INJECTION, POWDER, FOR SOLUTION INTRAVENOUS at 10:27

## 2021-03-11 RX ADMIN — Medication 1 AMPULE: at 21:41

## 2021-03-11 RX ADMIN — HYDROCODONE BITARTRATE AND ACETAMINOPHEN 1 TABLET: 7.5; 325 TABLET ORAL at 17:31

## 2021-03-11 RX ADMIN — Medication 81 MG: at 21:41

## 2021-03-11 RX ADMIN — Medication 10 MG: at 11:12

## 2021-03-11 RX ADMIN — BUSPIRONE HYDROCHLORIDE 10 MG: 5 TABLET ORAL at 17:32

## 2021-03-11 RX ADMIN — SODIUM CHLORIDE, SODIUM LACTATE, POTASSIUM CHLORIDE, AND CALCIUM CHLORIDE 75 ML/HR: 600; 310; 30; 20 INJECTION, SOLUTION INTRAVENOUS at 09:13

## 2021-03-11 RX ADMIN — MEPIVACAINE HYDROCHLORIDE 60 MG: 20 INJECTION, SOLUTION EPIDURAL; INFILTRATION at 10:40

## 2021-03-11 RX ADMIN — TRANEXAMIC ACID 1000 MG: 100 INJECTION, SOLUTION INTRAVENOUS at 10:36

## 2021-03-11 RX ADMIN — ACETAMINOPHEN 1000 MG: 500 TABLET, FILM COATED ORAL at 09:12

## 2021-03-11 RX ADMIN — MIRTAZAPINE 30 MG: 15 TABLET, FILM COATED ORAL at 21:41

## 2021-03-11 RX ADMIN — PHENYLEPHRINE HYDROCHLORIDE 100 MCG: 10 INJECTION INTRAVENOUS at 11:12

## 2021-03-11 NOTE — ANESTHESIA POSTPROCEDURE EVALUATION
Procedure(s):  LEFT HIP ARTHROPLASTY TOTAL/DEPUY.     spinal    Anesthesia Post Evaluation      Multimodal analgesia: multimodal analgesia used between 6 hours prior to anesthesia start to PACU discharge  Patient location during evaluation: PACU  Patient participation: complete - patient participated  Level of consciousness: awake  Pain management: adequate  Airway patency: patent  Anesthetic complications: no  Cardiovascular status: acceptable  Respiratory status: acceptable, spontaneous ventilation and nonlabored ventilation  Hydration status: acceptable  Post anesthesia nausea and vomiting:  none      INITIAL Post-op Vital signs:   Vitals Value Taken Time   /56 03/11/21 1210   Temp 37.1 °C (98.8 °F) 03/11/21 1158   Pulse 66 03/11/21 1210   Resp 18 03/11/21 1210   SpO2 95 % 03/11/21 1210

## 2021-03-11 NOTE — PROGRESS NOTES
03/11/21 1359   Oxygen Therapy   O2 Sat (%) 96 %   Pulse via Oximetry 64 beats per minute   O2 Device Room air   Incentive Spirometry Treatment   Actual Volume (ml) 1500 ml   Number of Attempts 1   Patient achieved    1500             Ml/sec on IS. Patient encouraged to do every hour while awake-patient agreed and demonstrated. No shortness of breath or distress noted. BS are clear b/l.    [facility] notes reviewed- continuous sat # ordered HS

## 2021-03-11 NOTE — PERIOP NOTES
TRANSFER - OUT REPORT:    Verbal report given to skyler ACUÑA on [facility]  being transferred to room 328 for routine post - op       Report consisted of patients Situation, Background, Assessment and   Recommendations(SBAR). Information from the following report(s) SBAR, Kardex, OR Summary, Procedure Summary, Intake/Output and MAR was reviewed with the receiving nurse. Lines:   Peripheral IV 03/11/21 Left Hand (Active)   Site Assessment Clean, dry, & intact 03/11/21 1225   Phlebitis Assessment 0 03/11/21 1225   Infiltration Assessment 0 03/11/21 1225   Dressing Status Clean, dry, & intact 03/11/21 1225   Dressing Type Transparent 03/11/21 1225   Hub Color/Line Status Green; Infusing 03/11/21 1225        Opportunity for questions and clarification was provided. Patient transported with:   Tech    VTE prophylaxis orders have not been written for [facility]. Patient and family given floor number and nurses name. Family updated re: pt status after security code verified.

## 2021-03-11 NOTE — PROGRESS NOTES
TRANSFER - IN REPORT:    Verbal report received from [facility] RN(name) on [facility]  being received from [facility]) for routine post - op      Report consisted of patients Situation, Background, Assessment and   Recommendations(SBAR). Information from the following report(s) SBAR, Kardex, OR Summary, Procedure Summary, Intake/Output and MAR was reviewed with the receiving nurse. Opportunity for questions and clarification was provided. Assessment completed upon patients arrival to unit and care assumed.

## 2021-03-11 NOTE — PROGRESS NOTES
Problem: Mobility Impaired (Adult and Pediatric)  Goal: *Acute Goals and Plan of Care (Insert Text)  Outcome: Progressing Towards Goal  Note: GOALS (1-4 days):  (1.)Ms. Stevens will move from supine to sit and sit to supine  in bed with STAND BY ASSIST.    (2.)Ms. Stevens will transfer from bed to chair and chair to bed with STAND BY ASSIST using the least restrictive device. (3.)Ms. Stevens will ambulate with STAND BY ASSIST for 150 feet with the least restrictive device. (4.)Ms. Stevens will ambulate up/down 4 steps with right railing with CONTACT GUARD ASSIST with no device. (5.)Ms. Stevens will state/observe JAMIL precautions with 0 verbal cues. ________________________________________________________________________________________________      PHYSICAL THERAPY JOINT CAMP JAMIL: Initial Assessment 3/11/2021  INPATIENT: Hospital Day: 1  [place] / Product Type: Medicare /      NAME/AGE/GENDER: Ivory Connors is a 67 y.o. female   PRIMARY DIAGNOSIS:  Primary osteoarthritis of left hip [M16.12]   Procedure(s) and Anesthesia Type:     * LEFT HIP ARTHROPLASTY TOTAL/DEPUY - Spinal (Left)  ICD-10: Treatment Diagnosis:    Pain in left hip (M25.552)  Stiffness of Left Hip, Not elsewhere classified (M25.652)  Difficulty in walking, Not elsewhere classified (R26.2)  Other abnormalities of gait and mobility (R26.89)      ASSESSMENT:     Ms. Cuong Becerra presents with decreased strength L LE and limited functional mobility S/P L JAMIL. Reviewed hip precautions and needs further review. Lives alone and plans to discharge home with support of son.  She will benefit from skilled therapy services to address the below problem list.     This section established at most recent assessment   PROBLEM LIST (Impairments causing functional limitations):  Decreased ADL/Functional Activities  Decreased Transfer Abilities  Decreased Ambulation Ability/Technique  Increased Pain  Decreased Knowledge of Precautions  Decreased [place] with Home Exercise Program  Decreased strength   INTERVENTIONS PLANNED: (Benefits and precautions of physical therapy have been discussed with the patient.)  Bed Mobility  Cold  Gait Training  Home Exercise Program (HEP)  Range of Motion (ROM)  Therapeutic Activites  Therapeutic Exercise/Strengthening  Transfer Training     TREATMENT PLAN: Frequency/Duration: Follow patient BID for duration of hospital stay to address above goals. Rehabilitation Potential For Stated Goals: Good     RECOMMENDED REHABILITATION/EQUIPMENT: (at time of discharge pending progress): Continue Skilled Therapy and Home Health: Physical Therapy. HISTORY:   History of Present Injury/Illness (Reason for Referral):  S/P L JAMIL  Past Medical History/Comorbidities:   Ms. Paola Gustafson  has a past medical history of Arthritis, Atrial fibrillation ([facility] [address] (2016), BMI 31.0-31.9,adult, Cataract of both eyes, CHF (congestive heart failure) ([facility] [address] Chronic pain, Dementia ([facility] [address] Generalized anxiety disorder (11/17/2016), GERD (gastroesophageal reflux disease), High blood pressure, High cholesterol, Insomnia secondary to anxiety (11/17/2016), Irregular heart beat, Left knee pain, Major depressive disorder, recurrent ([address] (11/17/2016), Neuropathy, Poor historian, Prediabetes, PUD (peptic ulcer disease), Thyroid disease, and Vitamin B 12 deficiency. Ms. Paola Gustafson  has a past surgical history that includes hx hysterectomy; hx lap cholecystectomy; hx knee replacement (Right); hx knee replacement (Left); and hx back surgery. Social History/Living Environment:   Home Environment: Private residence  # Steps to Enter: 4  Rails to Enter: Yes  Hand Rails : Right  One/Two Story Residence: One story  Living Alone: Yes  Support Systems: Child(clari)  Patient Expects to be Discharged to[de-identified] Private residence  Current DME Used/Available at Home: [place], straight; Shower chair;Walker, rolling  Tub or Shower Type: Tub/Shower combination    Prior Level of Function/Work/Activity:  Ambulating with RW   Number of Personal Factors/Comorbidities that affect the Plan of Care: 1-2: MODERATE COMPLEXITY   EXAMINATION:   Most Recent Physical Functioning:      Gross Assessment  AROM: Within functional limits(except L hip; hip precautions; S/P L JAMIL)  Strength: Generally decreased, functional          LLE AROM  L Hip Flexion: 90(hip precautions)          Bed Mobility  Supine to Sit: Minimum assistance  Scooting: Minimum assistance    Transfers  Sit to Stand: Contact guard assistance  Stand to Sit: Contact guard assistance  Bed to Chair: Contact guard assistance  Toilet Transfers : Contact guard assistance    Balance  Sitting: Intact  Standing: With support              Weight Bearing Status  Left Side Weight Bearing: As tolerated  Distance (ft): 40 Feet (ft)  Ambulation - Level of Assistance: Contact guard assistance  Assistive Device: Walker, rolling  Speed/Ekaterina: Pace decreased (<100 feet/min)  Step Length: Left shortened;Right shortened  Stance: Left decreased  Gait Abnormalities: Antalgic;Decreased step clearance  Interventions: Manual cues; Safety awareness training;Verbal cues     Braces/Orthotics: none    Left Hip Cold  Type: Cold/ice pack      Body Structures Involved:  Bones  Joints  Muscles Body Functions Affected:  Neuromusculoskeletal  Movement Related Activities and Participation Affected:  General Tasks and Demands  Mobility   Number of elements that affect the Plan of Care: 3: MODERATE COMPLEXITY   CLINICAL PRESENTATION:   Presentation: Stable and uncomplicated: LOW COMPLEXITY   CLINICAL DECISION MAKIN [facility] [address] [id number] AM-PAC 6 Clicks   Basic Mobility Inpatient Short Form  How much difficulty does the patient currently have. .. Unable A Lot A Little None   1. Turning over in bed (including adjusting bedclothes, sheets and blankets)? [] 1   [] 2   [x] 3   [] 4   2.   Sitting down on and standing up from a chair with arms ( e.g., wheelchair, bedside commode, etc.)   [] 1   [] 2   [x] 3   [] 4   3. Moving from lying on back to sitting on the side of the bed? [] 1   [] 2   [x] 3   [] 4   How much help from another person does the patient currently need. .. Total A Lot A Little None   4. Moving to and from a bed to a chair (including a wheelchair)? [] 1   [] 2   [x] 3   [] 4   5. Need to walk in hospital room? [] 1   [] 2   [x] 3   [] 4   6. Climbing 3-5 steps with a railing? [] 1   [x] 2   [] 3   [] 4   © 2007, Trustees of [address] [place] [address] [address] [address], under license to [employer]. All rights reserved     Score:  Initial: 17 Most Recent: X (Date: -- )    Interpretation of Tool:  Represents activities that are increasingly more difficult (i.e. Bed mobility, Transfers, Gait). Medical Necessity:     Patient demonstrates   good   rehab potential due to higher previous functional level. Reason for Services/Other Comments:  Patient   continues to require present interventions due to patient's inability to perform exercises and functional mobility independently  . Use of outcome tool(s) and clinical judgement create a POC that gives a: Clear prediction of patient's progress: LOW COMPLEXITY            TREATMENT:   (In addition to Assessment/Re-Assessment sessions the following treatments were rendered)     Pre-treatment Symptoms/Complaints:  L hip pain  Pain Initial:   Pain Intensity 1: 4  Pain Location 1: Hip  Pain Orientation 1: Left  Pain Intervention(s) 1: Cold pack, Repositioned  Post Session:  4     Therapeutic Exercise: (8 Minutes):  Exercises per grid below to improve mobility and strength. Required minimal verbal and manual cues to promote proper body mechanics.        Date:  3/11/21 Date:   Date:     ACTIVITY/EXERCISE AM PM AM PM AM PM   GROUP THERAPY  []  []  []  []  []  []   Ankle Pumps  10       Quad Sets  10       Gluteal Sets  10       Hip ABd/ADduction  10 AA       Straight Leg Raises  ---       Knee Slides  10 AA Short Arc Quads  10       Long Arc Quads         Chair Slides                  B = bilateral; AA = active assistive; A = active; P = passive      Treatment/Session Assessment:     Response to Treatment:  tolerated well. Education:  [x] Home Exercises  [x] Fall Precautions  [x] Hip Precautions [] D/C Instruction Review  [] Hip Prosthesis Review  [x] Walker Management/Safety [] Adaptive Equipment as Needed       Interdisciplinary Collaboration:   Physical Therapist  Occupational Therapist  Registered Nurse    After treatment position/precautions:   Up in chair  Bed/Chair-wheels locked  Call light within reach  RN notified    Compliance with Program/Exercises: Compliant most of the time, Will assess as treatment progresses. Recommendations/Intent for next treatment session:  Treatment next visit will focus on increasing Ms. Stevens's independence with bed mobility, transfers, gait training, strength/ROM exercises, modalities for pain, and patient education.       Total Treatment Duration:  PT Patient Time In/Time Out  Time In: 1505  Time Out: [address], PT

## 2021-03-11 NOTE — ANESTHESIA PREPROCEDURE EVALUATION
Relevant Problems   No relevant active problems       Anesthetic History               Review of Systems / Medical History  Patient summary reviewed, nursing notes reviewed and pertinent labs reviewed    Pulmonary  Within defined limits                 Neuro/Psych         Psychiatric history and dementia     Cardiovascular    Hypertension: well controlled        Dysrhythmias (x1 episode- none since) : atrial fibrillation  Hyperlipidemia  Pertinent negatives: No angina  Exercise tolerance: >4 METS  Comments: Remote limited echo with LA enlargement and normal EF   GI/Hepatic/Renal     GERD: well controlled           Endo/Other      Hypothyroidism: well controlled  Obesity and arthritis     Other Findings   Comments: Bilat foot neorpathy           Physical Exam    Airway  Mallampati: II      Mouth opening: Normal     Cardiovascular  Regular rate and rhythm,  S1 and S2 normal,  no murmur, click, rub, or gallop             Dental  No notable dental hx       Pulmonary  Breath sounds clear to auscultation               Abdominal         Other Findings            Anesthetic Plan    ASA: 3  Anesthesia type: spinal            Anesthetic plan and risks discussed with: Patient and Family

## 2021-03-11 NOTE — PROGRESS NOTES
Problem: Self Care Deficits Care Plan (Adult)  Goal: *Acute Goals and Plan of Care (Insert Text)  Description: GOALS:   DISCHARGE GOALS (in preparation for going home/rehab):  3 days  1. Ms. Jannie Alva will perform one lower body dressing activity with minimal assistance with adaptive equipment to demonstrate improved functional mobility and safety. 2.  Ms. Jannie Alva will perform one lower body bathing activity with minimal  assistance with adaptive equipment to demonstrate improved functional mobility and safety. 3.  Ms. Jannie Alva will perform toileting/toilet transfer with contact guard assistance with adaptive equipment to demonstrate improved functional mobility and safety. 4.  Ms. Jannie Alva will perform shower transfer with contact guard assistance with adaptive equipment to demonstrate improved functional mobility and safety. 5.  Ms. Jannie Alva will state JAMIL precautions with two verbal cues to demonstrate improved functional mobility and safety. Outcome: Progressing Towards Goal    JOINT CAMP OCCUPATIONAL THERAPY TKA: Initial Assessment, Daily Note, Treatment Day: Day of Assessment, and PM 3/11/2021  INPATIENT: Hospital Day: 1  [place] / Product Type: Medicare /      NAME/AGE/GENDER: Isabel Snyder is a 67 y.o. female   PRIMARY DIAGNOSIS:  Primary osteoarthritis of left hip [M16.12]   Procedure(s) and Anesthesia Type:     * LEFT HIP ARTHROPLASTY TOTAL/DEPUY - Spinal (Left)  ICD-10: Treatment Diagnosis:    · Pain in left hip (M25.552)  · Stiffness of Left Hip, Not elsewhere classified (M85.775)      ASSESSMENT:     Ms. Jannie Alva is s/p left TKA and presents with decreased weight bearing on left LE and decreased independence with functional mobility and activities of daily living as compared to baseline level of function and safety.  Patient would benefit from skilled Occupational Therapy to maximize independence and safety with self-care task and functional mobility. Pt would also benefit from education on adaptive equipment and safety precautions in preparation for going home. Pt up in room, to bathroom, toileted and donned clothes. Pt moves well and should do well tomorrow. This section established at most recent assessment   PROBLEM LIST (Impairments causing functional limitations):  1. Decreased Strength  2. Decreased ADL/Functional Activities  3. Decreased Transfer Abilities  4. Increased Pain  5. Increased Fatigue  6. Decreased Flexibility/Joint Mobility  7. Decreased Knowledge of Precautions   INTERVENTIONS PLANNED: (Benefits and precautions of occupational therapy have been discussed with the patient.)  1. Activities of daily living training  2. Adaptive equipment training  3. Balance training  4. Clothing management  5. Donning&doffing training  6. Theraputic activity     TREATMENT PLAN: Frequency/Duration: Follow patient 1-2 times to address above goals. Rehabilitation Potential For Stated Goals: Good     RECOMMENDED REHABILITATION/EQUIPMENT: (at time of discharge pending progress): Continue Skilled Therapy. OCCUPATIONAL PROFILE AND HISTORY:   History of Present Injury/Illness (Reason for Referral): Pt presents this date s/p (left) TKA. Past Medical History/Comorbidities:   Ms. Vito Irving  has a past medical history of Arthritis, Atrial fibrillation ([address] (2016), BMI 31.0-31.9,adult, Cataract of both eyes, CHF (congestive heart failure) ([address] Chronic pain, Dementia ([address] Generalized anxiety disorder (11/17/2016), GERD (gastroesophageal reflux disease), High blood pressure, High cholesterol, Insomnia secondary to anxiety (11/17/2016), Irregular heart beat, Left knee pain, Major depressive disorder, recurrent ([address] (11/17/2016), Neuropathy, Poor historian, Prediabetes, PUD (peptic ulcer disease), Thyroid disease, and Vitamin B 12 deficiency.   Ms. Vito Irving  has a past surgical history that includes hx hysterectomy; hx lap cholecystectomy; hx knee replacement (Right); hx knee replacement (Left); and hx back surgery. Social History/Living Environment:   Home Environment: Private residence  # Steps to Enter: 4  Rails to Enter: Yes  Hand Rails : Right  One/Two Story Residence: One story  Living Alone: Yes  Support Systems: Child(clari)  Patient Expects to be Discharged to[de-identified] Private residence  Current DME Used/Available at Home: Cane, straight, Shower chair, Walker, rolling  Tub or Shower Type: Tub/Shower combination  Prior Level of Function/Work/Activity:  Independent lives alone son check on her daily     Number of Personal Factors/Comorbidities that affect the Plan of Care: Brief history (0):  LOW COMPLEXITY   ASSESSMENT OF OCCUPATIONAL PERFORMANCE[de-identified]   Most Recent Physical Functioning:   Balance  Sitting: Intact  Standing: With support       Gross Assessment  AROM: Within functional limits(except L hip; hip precautions; S/P L JAMIL)  Strength: Generally decreased, functional          LLE AROM  L Hip Flexion: 90(hip precautions) Coordination  Fine Motor Skills-Upper: Left Intact; Right Intact  Gross Motor Skills-Upper: Left Intact; Right Intact         Mental Status  Neurologic State: Alert  Orientation Level: Oriented X4  Cognition: Appropriate decision making  Perception: Appears intact  Perseveration: No perseveration noted  Safety/Judgement: Awareness of environment                Basic ADLs (From Assessment) Complex ADLs (From Assessment)   Basic ADL  Feeding: Independent  Oral Facial Hygiene/Grooming: Supervision  Bathing: Moderate assistance  Upper Body Dressing: Supervision  Lower Body Dressing:  Moderate assistance  Toileting: Minimum assistance     Grooming/Bathing/Dressing Activities of Daily Living     Cognitive Retraining  Safety/Judgement: Awareness of environment                 Functional Transfers  Bathroom Mobility: Contact guard assistance  Toilet Transfer : Minimum assistance  Shower Transfer: Minimum assistance Bed/Mat Mobility  Supine to Sit: Minimum assistance  Sit to Stand: Contact guard assistance  Stand to Sit: Contact guard assistance  Bed to Chair: Contact guard assistance  Scooting: Minimum assistance         Physical Skills Involved:  1. Range of Motion  2. Balance  3. Strength Cognitive Skills Affected (resulting in the inability to perform in a timely and safe manner): 1. none Psychosocial Skills Affected:  1. Environmental Adaptation   Number of elements that affect the Plan of Care: 3-5:  MODERATE COMPLEXITY   CLINICAL DECISION MAKIN[address] [place] [address] AM-PAC 6 Clicks   Daily Activity Inpatient Short Form  How much help from another person does the patient currently need. .. Total A Lot A Little None   1. Putting on and taking off regular lower body clothing? [] 1   [x] 2   [] 3   [] 4   2. Bathing (including washing, rinsing, drying)? [] 1   [x] 2   [] 3   [] 4   3. Toileting, which includes using toilet, bedpan or urinal?   [] 1   [] 2   [x] 3   [] 4   4. Putting on and taking off regular upper body clothing? [] 1   [] 2   [] 3   [x] 4   5. Taking care of personal grooming such as brushing teeth? [] 1   [] 2   [] 3   [x] 4   6. Eating meals? [] 1   [] 2   [] 3   [x] 4   © , Trustees of [address] [place] [address], under license to [email or web address]. All rights reserved     Score:  Initial: 19 Most Recent: X (Date: -- )    Interpretation of Tool:  Represents activities that are increasingly more difficult (i.e. Bed mobility, Transfers, Gait).      Use of outcome tool(s) and clinical judgement create a POC that gives a: LOW COMPLEXITY            TREATMENT:   (In addition to Assessment/Re-Assessment sessions the following treatments were rendered)     Pre-treatment Symptoms/Complaints:  pt up in room to tolerated   Pain: Initial:     0 Post Session:  0     Self Care: (10 min): Procedure(s) (per grid) utilized to improve and/or restore self-care/home management as related to dressing, toileting, and grooming. Required minimal verbal, manual, and   cueing to facilitate activities of daily living skills and compensatory activities. OT evaluation completed   Treatment/Session Assessment:     Response to Treatment:  pt up in room and bathroom tolerated well. Education:  [] Home Exercises  [x] Fall Precautions  [] Hip Precautions [] Going Home Video  [x] Knee/Hip Prosthesis Review  [x] Walker Management/Safety [x] Adaptive Equipment as Needed       Interdisciplinary Collaboration:   o Physical Therapist  o Occupational Therapist  o Registered Nurse    After treatment position/precautions:   o Up in chair  o Bed/Chair-wheels locked  o Call light within reach  o RN notified     Compliance with Program/Exercises: Compliant all of the time, Will assess as treatment progresses. Recommendations/Intent for next treatment session:  Treatment next visit will focus on increasing Ms. Stevens's independence with bed mobility, transfers, self care, functional mobility, modalities for pain, and patient education.       Total Treatment Duration:  OT Patient Time In/Time Out  Time In: 1445  Time Out: [address], OT

## 2021-03-11 NOTE — H&P
The patient has end stage arthritis of the left hip joint. The patient was seen and examined and there are no changes to the patient's orthopedic condition. They have tried conservative treatment for this condition; including antiinflammatories and lifestyle modifications and have failed. The necessity for the joint replacement is still present, and the H&P from the office is still current. The patient will be admitted today forProcedure(s) (LRB):  LEFT HIP ARTHROPLASTY TOTAL/DEPUY (Left) .

## 2021-03-11 NOTE — OP NOTES
Total Hip Procedure Note               Terry Patel, [id number], 1945    Pre-operative Diagnosis: Primary osteoarthritis of left hip [M16.12]      Post-operative Diagnosis: Primary osteoarthritis of left hip [M16.12]     Procedure: Left Total Hip Arthroplasty     BMI: Body mass index is 31.15 kg/m². [facility] Location: [address] [place] [address]      Surgeon: Edwina Hawkins MD      Assistant: SANTOS Romero    Anesthesia: Spinal  Modifier: 22  BMI:Body mass index is 31.15 kg/m². EBL: 200 cc     Procedure: Total Hip Arthroplasty    The complexity of total joint surgery requires the use of a skilled first assistant for positioning, retraction and assistance in closure. This BMI for this patient is Body mass index is 31.15 kg/m². [facility] BMI's between 30 and 38.9 are considered obese, while a BMI over 39 indicates the patient is morbidly obese. Obese and Morbidly obese patients make exposure and retraction extremely difficult. The patient's size and surgical complexity makes implantation and proper insertion of total joint implants more difficult requiring a skilled first 281 Mark Peñaloza was brought to the operating room and positioned on the operating table. Anesthesia was administered. IV antibiotics were administered, along with IV transexamic acid. A time out identifying the patient, procedure, operative side and surgeon was administered and charted by the OR Nurse. Janeen Ayon was positioned on left lateral decubitus position. The limb was prepped and draped in the usual sterile fashion. The Posterior approach was utilized to expose the hip. The incision was carried through the subcutaneous tissue and underlying fascia with hemostasis obtained using the bovie cauterization. A Charmley retractor was inserted. The short external rotators were divided at their insertion. The sciatic nerve was palpated and identified.  Next, a T-shaped capsular incision was accomplished and femoral head was dislocated. The articular surface revealed loss of cartilage, exposed bone and bone spurring. The neck was osteotomized in the appropriate position just above the lesser trochanteric region. The neck cut was measured to be 12 mm. Acetabular retractors were placed and the appropriate capsulotomy performed. Soft tissue was removed from the acetabulum. The acetabular surface revealed loss of cartilage with exposed bone. The acetabulum was sequentially reamed. Using trial components, the acetabulum was sized to a 50 mm Kinsman acetabular cup. The acetabular component was impacted to achieve appropriate horizontal tilt, anteversion, coverage, and stability. 1 screw was  used to stabilize the cup. The metal liner was impacted into position. Utilizing the femoral retractor, the canal was prepared with appropriate lateralization with the starter rasp. The canal was then broached progressively. The broach was positioned with the appropriate anatomic femoral anteversion. A calcar planar was utilized. A trial reduction with a +4 neck length was utilized. This was found to be the most stable to flexion greater than 90 degrees and on internal and external rotation. Limb lengths were thought to be equilibrated and with appropriate stability as mentioned above. All trial components were removed. The cementless permanent size 5 std offset ACTIS  was impacted into place. A trial reduction was performed and the above neck length was selected. The permanent femoral head and poly dual mobility articular head was impacted into place. Hamzah Stevens's hip was reduced and stability was as mentioned above. Copious irrigation was accomplished about the surgical site. The sciatic nerve was palpated and noted to be intact. The capsule was repaired with a #1 PDS and the external rotators were reattached with a #5 Mersilene.      A lavage of diluted betadine solution of 17.5 ml Betadine in 500 of 0.9% Normal Saline was allowed to soak in the wound for 3 minutes after implanting of the prosthesis. The wound was irrigated with Saline again before closure. The joint and skin areas were sprinkled with 1 gm of vancomycin powder. Prior to the final skin closure, full strength betadine was applied to the skin surrounding the skin incision. The operative hip was injected prior to closure for post operative pain management. A #1 PDS suture was used to re-approximate the fascia. 60 cc of transexamic acid was injected under the fascia for hemostasis. Monocryl sutures were used to approximate the subcutaneous layers. Staples were used to reapproximate the skin edges and a sterile bandage was placed. The patient was then rolled to a supine position. The sponge and needle counts were correct. The patient tolerated the procedure without difficulty and left the operating room in satisfactory condition. Implants:   Implant Name Type Inv.  Item Serial No.  Lot No. LRB No. Used Action   SHELL ACET [id number] HIP Amadeo Jean Marie DSGN - [id number]  SHELL ACET [id number] HIP Amadeo Jean Marie DSGN [id number] [facility] [facility] [id number] Left 1 Implanted   SCREW BNE L25MM DIA6.5MM CANC HIP Teryl Cyn THRD - [id number]  SCREW BNE L25MM DIA6.5MM CANC HIP Nayeli Manger FULL THRD [id number] [facility] [id number] Left 1 Implanted   STEM FEM SZ 5 HIP STD OFFSET CLLRD CEMENTLESS 12/14 TAPR - [id number]  STEM FEM SZ 5 HIP STD OFFSET CLLRD CEMENTLESS 12/14 TAPR [id number] [facility] [id number] Left 1 Implanted   DUAL MOBILITY HIP LINER 50/43   [id number]  [id number] Left 1 Implanted   BI-MENTUM PE LINER 22,2/43   [id number]  [id number] Left 1 Implanted   HEAD FEM DIA22.225MM +4MM OFFSET 12/14 TAPR HIP MTL ARTC EZ - [id number]  HEAD FEM DIA22.225MM +4MM OFFSET 12/14 TAPR HIP [facility] EZ [id number] [facility] [employer] [facility] [id number] Left 1 Implanted        By: Cheri Daily MD

## 2021-03-11 NOTE — PROGRESS NOTES
Care Management Interventions  PCP Verified by CM: Yes  Mode of Transport at Discharge: Self  Transition of Care Consult (CM Consult): [address]: Yes  Discharge Durable Medical Equipment: Yes(3in1 [facility])  Physical Therapy Consult: Yes  Occupational Therapy Consult: Yes  Current Support Network: Own Home  Confirm Follow Up Transport: Family  The Plan for Transition of Care is Related to the Following Treatment Goals : Return to independent function. The Patient and/or Patient Representative was Provided with a Choice of Provider and Agrees with the Discharge Plan?: Yes  Freedom of Choice List was Provided with Basic Dialogue that Supports the Patient's Individualized Plan of Care/Goals, Treatment Preferences and Shares the Quality Data Associated with the Providers?: Yes  Discharge Location  Discharge Placement: Home with home health    Patient is a 67y.o. year old female admitted for Left JAMIL . Patient plans to return home on discharge. Order received to arrange home health. Patient without preference towards agency. Referral sent to [facility]. Patient has a walker. Patient requesting we arrange a bedside commode. Referral sent to [address] [place], [place] [address] delivered to the hospital room prior to discharge. Will follow until discharge.

## 2021-03-11 NOTE — ANESTHESIA PROCEDURE NOTES
Spinal Block    Start time: 3/11/2021 10:35 AM  End time: 3/11/2021 10:40 AM  Performed by: Jerardo Bolden MD  Authorized by: Jerardo Bolden MD     Pre-procedure:   Indications: primary anesthetic  Preanesthetic Checklist: patient identified, risks and benefits discussed, anesthesia consent, patient being monitored and timeout performed    Timeout Time: 10:35          Spinal Block:   Patient Position:  Seated  Prep Region:  Lumbar  Prep: chlorhexidine and patient draped      Location:  L3-4  Technique:  Single shot    Local Dose (mL):  3    Needle:   Needle Type:  Pencan  Needle Gauge:  25 G  Attempts:  1      Events: CSF confirmed, no blood with aspiration and no paresthesia        Assessment:  Insertion:  Uncomplicated  Patient tolerance:  Patient tolerated the procedure well with no immediate complications

## 2021-03-12 LAB — HGB BLD-MCNC: 10.8 G/DL (ref 11.7–15.4)

## 2021-03-12 PROCEDURE — 97116 GAIT TRAINING THERAPY: CPT

## 2021-03-12 PROCEDURE — 97110 THERAPEUTIC EXERCISES: CPT

## 2021-03-12 PROCEDURE — 74011000250 HC RX REV CODE- 250: Performed by: PHYSICIAN ASSISTANT

## 2021-03-12 PROCEDURE — 97535 SELF CARE MNGMENT TRAINING: CPT

## 2021-03-12 PROCEDURE — 85018 HEMOGLOBIN: CPT

## 2021-03-12 PROCEDURE — 74011250636 HC RX REV CODE- 250/636: Performed by: PHYSICIAN ASSISTANT

## 2021-03-12 PROCEDURE — 65270000029 HC RM PRIVATE

## 2021-03-12 PROCEDURE — 36415 COLL VENOUS BLD VENIPUNCTURE: CPT

## 2021-03-12 PROCEDURE — 74011250637 HC RX REV CODE- 250/637: Performed by: PHYSICIAN ASSISTANT

## 2021-03-12 PROCEDURE — 97530 THERAPEUTIC ACTIVITIES: CPT

## 2021-03-12 PROCEDURE — 74011250637 HC RX REV CODE- 250/637: Performed by: ORTHOPAEDIC SURGERY

## 2021-03-12 RX ORDER — HYDROCODONE BITARTRATE AND ACETAMINOPHEN 7.5; 325 MG/1; MG/1
1 TABLET ORAL
Qty: 30 TAB | Refills: 0 | Status: SHIPPED | OUTPATIENT
Start: 2021-03-12 | End: 2021-03-17

## 2021-03-12 RX ORDER — ASPIRIN 81 MG/1
81 TABLET ORAL EVERY 12 HOURS
Qty: 70 TAB | Refills: 0 | Status: SHIPPED | OUTPATIENT
Start: 2021-03-12 | End: 2021-04-16

## 2021-03-12 RX ADMIN — Medication 1 AMPULE: at 08:07

## 2021-03-12 RX ADMIN — METOPROLOL TARTRATE 25 MG: 25 TABLET, FILM COATED ORAL at 08:04

## 2021-03-12 RX ADMIN — BUSPIRONE HYDROCHLORIDE 10 MG: 5 TABLET ORAL at 19:52

## 2021-03-12 RX ADMIN — CEFAZOLIN SODIUM 2 G: 100 INJECTION, POWDER, LYOPHILIZED, FOR SOLUTION INTRAVENOUS at 01:21

## 2021-03-12 RX ADMIN — DOCUSATE SODIUM 50MG AND SENNOSIDES 8.6MG 2 TABLET: 8.6; 5 TABLET, FILM COATED ORAL at 08:04

## 2021-03-12 RX ADMIN — HYDROCODONE BITARTRATE AND ACETAMINOPHEN 1 TABLET: 7.5; 325 TABLET ORAL at 23:51

## 2021-03-12 RX ADMIN — HYDROCODONE BITARTRATE AND ACETAMINOPHEN 1 TABLET: 7.5; 325 TABLET ORAL at 12:35

## 2021-03-12 RX ADMIN — HYDROCODONE BITARTRATE AND ACETAMINOPHEN 1 TABLET: 7.5; 325 TABLET ORAL at 19:53

## 2021-03-12 RX ADMIN — Medication 81 MG: at 19:53

## 2021-03-12 RX ADMIN — ALPRAZOLAM 0.5 MG: 0.5 TABLET ORAL at 05:06

## 2021-03-12 RX ADMIN — Medication 1 AMPULE: at 19:52

## 2021-03-12 RX ADMIN — HYDROCODONE BITARTRATE AND ACETAMINOPHEN 1 TABLET: 7.5; 325 TABLET ORAL at 04:56

## 2021-03-12 RX ADMIN — BUSPIRONE HYDROCHLORIDE 10 MG: 5 TABLET ORAL at 08:03

## 2021-03-12 RX ADMIN — HYDROCODONE BITARTRATE AND ACETAMINOPHEN 1 TABLET: 7.5; 325 TABLET ORAL at 16:29

## 2021-03-12 RX ADMIN — GABAPENTIN 800 MG: 400 CAPSULE ORAL at 08:04

## 2021-03-12 RX ADMIN — GABAPENTIN 800 MG: 400 CAPSULE ORAL at 16:29

## 2021-03-12 RX ADMIN — FLUOXETINE 20 MG: 20 CAPSULE ORAL at 08:04

## 2021-03-12 RX ADMIN — DONEPEZIL HYDROCHLORIDE 10 MG: 5 TABLET, FILM COATED ORAL at 19:52

## 2021-03-12 RX ADMIN — BUSPIRONE HYDROCHLORIDE 10 MG: 5 TABLET ORAL at 16:29

## 2021-03-12 RX ADMIN — LEVOTHYROXINE SODIUM 75 MCG: 0.07 TABLET ORAL at 04:56

## 2021-03-12 RX ADMIN — Medication 81 MG: at 08:03

## 2021-03-12 RX ADMIN — GABAPENTIN 800 MG: 400 CAPSULE ORAL at 19:53

## 2021-03-12 RX ADMIN — HYDROCODONE BITARTRATE AND ACETAMINOPHEN 1 TABLET: 7.5; 325 TABLET ORAL at 08:28

## 2021-03-12 RX ADMIN — FERROUS SULFATE TAB 325 MG (65 MG ELEMENTAL FE) 325 MG: 325 (65 FE) TAB at 17:00

## 2021-03-12 RX ADMIN — MIRTAZAPINE 30 MG: 15 TABLET, FILM COATED ORAL at 19:53

## 2021-03-12 NOTE — PROGRESS NOTES
03/11/21 2046   Oxygen Therapy   O2 Sat (%) 99 %   Pulse via Oximetry 77 beats per minute   O2 Device Room air   Pt on continuous monitor for HS. Alarm limits set.

## 2021-03-12 NOTE — PROGRESS NOTES
2021         Post Op day: 1 Day Post-Op   Admit Diagnosis: Primary osteoarthritis of left hip [M16.12]; Osteoarthritis of left hip [M16.12]  LAB:    Recent Results (from the past 24 hour(s))   GLUCOSE, POC    Collection Time: 21  9:18 AM   Result Value Ref Range    Glucose (POC) 141 (H) 65 - 100 mg/dL   HEMOGLOBIN    Collection Time: 21  7:03 PM   Result Value Ref Range    HGB 10.5 (L) 11.7 - 15.4 g/dL   HEMOGLOBIN    Collection Time: 21  3:41 AM   Result Value Ref Range    HGB 10.8 (L) 11.7 - 15.4 g/dL     Vital Signs:    Patient Vitals for the past 8 hrs:   BP Temp Pulse Resp SpO2   21 0715 113/68 98.9 °F (37.2 °C) (!) 106 18 93 %   21 0456 (!) 158/73 97.7 °F (36.5 °C) 98 18 96 %     Temp (24hrs), Av.1 °F (36.7 °C), Min:97.3 °F (36.3 °C), Max:98.9 °F (37.2 °C)    Pain Control:   Pain Assessment  Pain Scale 1: FLACC  Pain Intensity 1: 0  Pain Location 1: Hip  Pain Orientation 1: Left  Pain Description 1: Aching, Sore, Sharp, Constant, Dull  Pain Intervention(s) 1: Medication (see MAR)  Subjective: Doing well, normal recovery experienced. Objective:  No Acute Distress, Alert and Oriented, Neurovascular exam is normal       Assessment:   Patient Active Problem List   Diagnosis Code    Primary insomnia F51.01    Generalized anxiety disorder F41.1    Unresolved grief F43.21    Adjustment disorder with anxious mood F43.22    Recurrent major depressive disorder, in partial remission (RUSTca 75.) F33.41    Overweight (BMI 25.0-29. 9) E66.3    Osteoarthritis of left hip M16.12       Status Post Procedure(s) (LRB):  LEFT HIP ARTHROPLASTY TOTAL/DEPUY (Left)        Plan: Continue Physical Therapy, discharge home anticipated.    Signed By: Jose Beck MD

## 2021-03-12 NOTE — DISCHARGE INSTRUCTIONS
[id number] [facility]   Patient Discharge Instructions    Soheila Obando / [id number] : 1945    Admitted 3/11/2021 Discharged: 3/12/2021     IF YOU HAVE ANY PROBLEMS ONCE YOU ARE AT HOME CALL THE FOLLOWING NUMBERS:   Main office number: [phone]    Take Home Medications     · It is important that you take the medication exactly as they are prescribed. · Keep your medication in the bottles provided by the pharmacist and keep a list of the medication names, dosages, and times to be taken in your wallet. · Do not take other medications without consulting your doctor. What to do at [address] your prehospital diet. If you have excessive nausea or vomitting call your doctor's office     Home Physical Therapy is arranged. Use rolling walker when walking. Patients who have had a joint replacement should not drive until you are seen for your follow up appointment by Dr. Joe Donovan. When to Call    - Call if you have a temperature greater then 101  - Unable to keep food down  - Loose control of your bladder or bowel function  - Are unable to bear any weight   - Need a pain medication refill     Patient Education        Hip Replacement Surgery (Posterior): What to Expect at Home  Your Recovery  Hip replacement surgery replaces the worn parts of your hip joint. When you leave the hospital, you will probably be walking with crutches or a walker. You may be able to climb a few stairs and get in and out of bed and chairs. But you will need someone to help you at home for the next few weeks or until you have more energy and can move around better. If you need more extensive rehab, you may go to a specialized rehab center for more treatment. You will go home with a bandage and stitches, staples, tissue glue, or tape strips. You can remove the bandage when your doctor tells you to. If you have stitches or staples, your doctor will remove them 10 days to 3 weeks after your surgery.  Glue or tape strips will fall off on their own over time. You may still have some mild pain, and the area may be swollen for 3 to 4 months after surgery. Your doctor will give you medicine for the pain. You will continue the rehabilitation program (rehab) you started in the hospital. The better you do with your rehab exercises, the sooner you will get your strength and movement back. Most people are able to return to work 4 weeks to 4 months after surgery. This care sheet gives you a general idea about how long it will take for you to recover. But each person recovers at a different pace. Follow the steps below to get better as quickly as possible. How can you care for yourself at home? Activity    · Your doctor may not want your affected leg to cross the center of your body toward the other leg. If so, your therapist may suggest these ideas:  ? Do not cross your legs. ? Be very careful as you get in or out of bed or a car, so your leg does not cross that imaginary line in the middle of your body.     · Go slowly when you climb stairs. Make sure the lights are on. Have someone watch you, if you can. When you climb stairs:  ? Step up first with your unaffected leg. Then bring the affected leg up to the same step. Bring your crutches or cane up. ? To go down stairs, reverse the order. First, put your crutches or cane on the lower step. Then bring the affected leg down to that step. Finally, step down with the unaffected leg.     · You can ride in a car, but stop at least once every hour to get out and walk around.     · You may want to sleep on your back. Don't reach down too far to pull up blankets when you lie in bed.     · If your doctor recommends exercises, do them as directed. You can cut back on your exercises if your muscles start to ache, but don't stop doing them.     · Rest when you feel tired.  You may take a nap, but don't stay in bed all day.     · Work with your physical therapist to learn the best way to exercise. You will probably have to use crutches or a walker for at least 4 to 6 weeks.     · Your doctor may advise you to stay away from activities that put stress on the joint. This includes sports such as tennis, football, and jogging.     · Try not to sit for too long at one time. You will feel less stiff if you take a short walk about every hour. When you sit, use chairs with arms, and don't sit in low chairs.     · Do not bend over more than 90 degrees (like the angle in a letter \"L\").     · Sleep on your back with your legs slightly apart or on your side with a pillow between your knees for about 6 weeks or as your doctor tells you. Do not sleep on your stomach or affected leg.     · Ask your doctor when you can drive again.     · Most people are able to return to work 4 weeks to 4 months after surgery.     · Ask your doctor when it is okay for you to have sex. Diet    · By the time you leave the hospital, you will probably be eating your normal diet. If your stomach is upset, try bland, low-fat foods like plain rice, broiled chicken, toast, and yogurt. Your doctor may recommend that you take iron and vitamin supplements.     · Drink plenty of fluids (unless your doctor tells you not to).   · Eat healthy foods, and watch your portion sizes. Try to stay at your ideal weight. Too much weight puts more stress on your new hip joint.     · You may notice that your bowel movements are not regular right after your surgery. This is common. Try to avoid constipation and straining with bowel movements. You may want to take a fiber supplement every day. If you have not had a bowel movement after a couple of days, ask your doctor about taking a mild laxative. Medicines    · Your doctor will tell you if and when you can restart your medicines.  He or she will also give you instructions about taking any new medicines.     · If you take aspirin or some other blood thinner, ask your doctor if and when to start taking it again. Make sure that you understand exactly what your doctor wants you to do.     · Your doctor may give you a blood-thinning medicine to prevent blood clots. If you take a blood thinner, be sure you get instructions about how to take your medicine safely. Blood thinners can cause serious bleeding problems. This medicine could be in pill form or as a shot (injection). If a shot is necessary, your doctor will tell you how to do this.     · Be safe with medicines. Take pain medicines exactly as directed. ? If the doctor gave you a prescription medicine for pain, take it as prescribed. ? If you are not taking a prescription pain medicine, ask your doctor if you can take an over-the-counter medicine.     · If you think your pain medicine is making you sick to your stomach:  ? Take your medicine after meals (unless your doctor has told you not to). ? Ask your doctor for a different pain medicine.     · If your doctor prescribed antibiotics, take them as directed. Do not stop taking them just because you feel better. You need to take the full course of antibiotics. Incision care    · If your doctor told you how to care for your cut (incision), follow your doctor's instructions. You will have a dressing over the cut. A dressing helps the incision heal and protects it. Your doctor will tell you how to take care of this.     · If you did not get instructions, follow this general advice:  ? If you have strips of tape on the cut the doctor made, leave the tape on for a week or until it falls off.  ? If you have stitches or staples, your doctor will tell you when to come back to have them removed. ? If you have skin adhesive on the cut, leave it on until it falls off. Skin adhesive is also called glue or liquid stitches. ? Change the bandage every day. ? Wash the area daily with warm water, and pat it dry. Don't use hydrogen peroxide or alcohol. They can slow healing.   ? You may cover the area with a gauze bandage if it oozes fluid or rubs against clothing. ? You may shower 24 to 48 hours after surgery. Pat the incision dry. Don't swim or take a bath for the first 2 weeks, or until your doctor tells you it is okay. Exercise    · Your rehab program will include a number of exercises to do. Always do them as your therapist tells you. Ice and elevation    · For pain, put ice or a cold pack on the area for 10 to 20 minutes at a time. Put a thin cloth between the ice and your skin.     · Your ankle may swell for about 3 months. Prop up your ankle when you ice it or anytime you sit or lie down. Try to keep it above the level of your heart. This will help reduce swelling. Other instructions    · Continue to wear your support stockings as your doctor says. These help to prevent blood clots. How long you'll have to wear them depends on your activity level and the amount of swelling you have. Most people wear these stockings for 4 to 6 weeks after surgery.     · Try to prevent falls. To avoid falling:  ? Arrange furniture so that you will not trip on it. ? Get rid of throw rugs, and move electrical cords out of the way. ? Walk only in areas with plenty of light. ? Put grab bars in showers and bathtubs. ? Avoid icy or snowy sidewalks. ? Wear shoes with sturdy, flat soles. Follow-up care is a key part of your treatment and safety. Be sure to make and go to all appointments, and call your doctor if you are having problems. It's also a good idea to know your test results and keep a list of the medicines you take. When should you call for help? Call 911 anytime you think you may need emergency care. For example, call if:    · You passed out (lost consciousness).     · You have severe trouble breathing.     · You have sudden chest pain and shortness of breath, or you cough up blood.    Call your doctor now or seek immediate medical care if:    · You have signs that your hip may be dislocated, including:  ? Severe pain and not being able to stand. ? A crooked leg that looks like your hip is out of position. ? Not being able to bend or straighten your leg.     · Your leg or foot is cool or pale or changes color.     · You cannot feel or move your leg.     · You have signs of a blood clot, such as:  ? Pain in your calf, back of the knee, thigh, or groin. ? Redness and swelling in your leg or groin.     · Your incision comes open and begins to bleed, or the bleeding increases.     · You feel like your heart is racing or beating irregularly.     · You have signs of infection, such as:  ? Increased pain, swelling, warmth, or redness. ? Red streaks leading from the incision. ? Pus draining from the incision. ? A fever. Watch closely for changes in your health, and be sure to contact your doctor if:    · You do not have a bowel movement after taking a laxative.     · You do not get better as expected. Where can you learn more? Go to http://www.gray.com/  Enter Q746 in the search box to learn more about \"Hip Replacement Surgery (Posterior): What to Expect at Home. \"  Current as of: March 2, 2020               Content Version: 12.6  © 5419-2292 [employer]. Care instructions adapted under license by [employer] (which disclaims liability or warranty for this information). If you have questions about a medical condition or this instruction, always ask your healthcare professional. [place] [address] any warranty or liability for your use of this information. Information obtained by :  I understand that if any problems occur once I am at home I am to contact my physician. I understand and acknowledge receipt of the instructions indicated above.                                                                                                                                            Physician's or R.N.'s Signature Date/Time                                                                                                                                              Patient or Representative Signature                                                          Date/Time

## 2021-03-12 NOTE — PROGRESS NOTES
's initial visit to explore spiritual needs and convey care and concern. The visit was welcomed and I offered spiritual interventions including active listening, affirmation of kristine & emotions, exploration of coping skills & support system, and prayer as requested. Patient expressed gratitude for the visit. Chaplains remain available for follow-up care.     Mery 86, Marie 67  Board Certified

## 2021-03-12 NOTE — PROGRESS NOTES
Shift assessment complete. Pt a/ox4 and resting in bed. Dressing to left hip c/d/i with ice in place. Demonstrates strong bilateral plantar/dorsiflexion with pedal pulses present and palpable +2. IV site c/d/i, patent and capped. Bed in lowest position, wheels locked, side rails x3, gripper socks on, and call light in reach. Encouraged to call for help if needed and pt verbalized understanding.

## 2021-03-12 NOTE — PROGRESS NOTES
Problem: Mobility Impaired (Adult and Pediatric)  Goal: *Acute Goals and Plan of Care (Insert Text)  Outcome: Progressing Towards Goal  Note: GOALS (1-4 days):  (1.)Ms. Stevens will move from supine to sit and sit to supine  in bed with STAND BY ASSIST.    (2.)Ms. Stevens will transfer from bed to chair and chair to bed with STAND BY ASSIST using the least restrictive device. (3.)Ms. Stevens will ambulate with STAND BY ASSIST for 150 feet with the least restrictive device. (4.)Ms. Stevens will ambulate up/down 4 steps with right railing with CONTACT GUARD ASSIST with no device. (5.)Ms. Stevens will state/observe JAMIL precautions with 0 verbal cues. ________________________________________________________________________________________________      PHYSICAL THERAPY JOINT CAMP JAMIL: Daily Note and AM 3/12/2021  INPATIENT: Hospital Day: 2  [place] / [place] / Product Type: Medicare /      NAME/AGE/GENDER: Lena Aviles is a 67 y.o. female   PRIMARY DIAGNOSIS:  Primary osteoarthritis of left hip [M16.12]   Procedure(s) and Anesthesia Type:     * LEFT HIP ARTHROPLASTY TOTAL/DEPUY - Spinal (Left)  ICD-10: Treatment Diagnosis:    · Pain in left hip (M25.552)  · Stiffness of Left Hip, Not elsewhere classified (M25.652)  · Difficulty in walking, Not elsewhere classified (R26.2)  · Other abnormalities of gait and mobility (R26.89)      ASSESSMENT:     Ms. Halley Hale presents with decreased strength L LE and limited functional mobility S/P L JAMIL. Reviewed hip precautions and needs further review. Lives alone and plans to discharge home with support of son. She will benefit from skilled therapy services to address the below problem list.   3/12 sitting in the recliner upon arrival.  Performs TH exercises with verbal cues and guidance to stay within hip precaution. Sit>stand with CGA. Walk to restroom using RW with CGA while working on normal gait pattern.   Independent with hygiene. Walk back to recliner with CGA, and needs in reach. This section established at most recent assessment   PROBLEM LIST (Impairments causing functional limitations):  1. Decreased ADL/Functional Activities  2. Decreased Transfer Abilities  3. Decreased Ambulation Ability/Technique  4. Increased Pain  5. Decreased Knowledge of Precautions  6. Decreased [place] with Home Exercise Program  7. Decreased strength   INTERVENTIONS PLANNED: (Benefits and precautions of physical therapy have been discussed with the patient.)  1. Bed Mobility  2. Cold  3. Gait Training  4. Home Exercise Program (HEP)  5. Range of Motion (ROM)  6. Therapeutic Activites  7. Therapeutic Exercise/Strengthening  8. Transfer Training     TREATMENT PLAN: Frequency/Duration: Follow patient BID for duration of hospital stay to address above goals. Rehabilitation Potential For Stated Goals: Good     RECOMMENDED REHABILITATION/EQUIPMENT: (at time of discharge pending progress): Continue Skilled Therapy and Home Health: Physical Therapy. HISTORY:   History of Present Injury/Illness (Reason for Referral):  S/P L JAMIL  Past Medical History/Comorbidities:   Ms. 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 Ms. Bernhard Oppenheim  has a past surgical history that includes hx hysterectomy; hx lap cholecystectomy; hx knee replacement (Right); hx knee replacement (Left); and hx back surgery.    Social History/Living Environment:   Home Environment: Private residence  # Steps to Enter: 4  Rails to Enter: Yes  [employer] : Right  One/Two Story Residence: One story  Living Alone: Yes  Support Systems: Child(clrai)  Patient Expects to be Discharged to[de-identified] Private residence  Current DME Used/Available at Home: [employer], straight; Shower chair;Walker, rolling  Tub or Shower Type: Tub/Shower combination    Prior Level of Function/Work/Activity:  Ambulating with RW   Number of Personal Factors/Comorbidities that affect the Plan of Care: 1-2: MODERATE COMPLEXITY   EXAMINATION:   Most Recent Physical Functioning:                            Bed Mobility  Supine to Sit: Supervision    Transfers  Sit to Stand: Supervision  Stand to Sit: Supervision  Bed to Chair: Supervision    Balance  Sitting: Intact  Standing: Intact              Weight Bearing Status  Left Side Weight Bearing: As tolerated  Distance (ft): 40 Feet (ft)  Ambulation - Level of Assistance: Contact guard assistance  Assistive Device: Walker, rolling  Speed/Ekaterina: Pace decreased (<100 feet/min)  Step Length: Left shortened  Stance: Left decreased;Right decreased  Gait Abnormalities: Decreased step clearance        Braces/Orthotics: none    Left Hip Cold  Type: Cold/ice pack      Body Structures Involved:  1. Bones  2. Joints  3. Muscles Body Functions Affected:  1. Neuromusculoskeletal  2. Movement Related Activities and Participation Affected:  1. General Tasks and Demands  2. Mobility   Number of elements that affect the Plan of Care: 3: MODERATE COMPLEXITY   CLINICAL PRESENTATION:   Presentation: Stable and uncomplicated: LOW COMPLEXITY   CLINICAL DECISION MAKIN [facility] [address] [id number] AM-PAC 6 Clicks   Basic Mobility Inpatient Short Form  How much difficulty does the patient currently have. .. Unable A Lot A Little None   1. Turning over in bed (including adjusting bedclothes, sheets and blankets)? [] 1   [] 2   [x] 3   [] 4   2.   Sitting down on and standing up from a chair with arms ( e.g., wheelchair, bedside commode, etc.)   [] 1   [] 2   [x] 3   [] 4   3. Moving from lying on back to sitting on the side of the bed? [] 1   [] 2   [x] 3   [] 4   How much help from another person does the patient currently need. .. Total A Lot A Little None   4. Moving to and from a bed to a chair (including a wheelchair)? [] 1   [] 2   [x] 3   [] 4   5. Need to walk in hospital room? [] 1   [] 2   [x] 3   [] 4   6. Climbing 3-5 steps with a railing? [] 1   [x] 2   [] 3   [] 4   © 2007, Trustees of [address] [place] [address] [address] [id number], under license to [employer]. All rights reserved     Score:  Initial: 17 Most Recent: X (Date: -- )    Interpretation of Tool:  Represents activities that are increasingly more difficult (i.e. Bed mobility, Transfers, Gait). Medical Necessity:     · Patient demonstrates   · good  ·  rehab potential due to higher previous functional level. Reason for Services/Other Comments:  · Patient   · continues to require present interventions due to patient's inability to perform exercises and functional mobility independently  · . Use of outcome tool(s) and clinical judgement create a POC that gives a: Clear prediction of patient's progress: LOW COMPLEXITY            TREATMENT:   (In addition to Assessment/Re-Assessment sessions the following treatments were rendered)     Pre-treatment Symptoms/Complaints:  Doing fine  Pain Initial:   Pain Intensity 1: 3(pain med given)  Post Session:      Therapeutic Exercise: (15 Minutes):  Exercises per grid below to improve mobility and strength. Required minimal verbal and manual cues to promote proper body mechanics. Gait Training (15 Minutes):  Gait training to improve and/or restore physical functioning as related to mobility. Ambulated 40 Feet (ft) with Contact guard assistance using a Walker, rolling and minimal   related to their hip position and motion to promote proper body alignment.       Date:  3/11/21 Date:  3/12   Date: ACTIVITY/EXERCISE AM PM AM PM AM PM   GROUP THERAPY  []  []  []  []  []  []   Ankle Pumps  10 15      Quad Sets  10 15      Gluteal Sets  10 15      Hip ABd/ADduction  10 AA 15 aa      Straight Leg Raises  ---       Knee Slides  10 AA 15 aa      Short Arc Quads  10 15      Long Arc Quads   15      Chair Slides                  B = bilateral; AA = active assistive; A = active; P = passive      Treatment/Session Assessment:     Response to Treatment:  tolerated gait and exercises well    Education:  [x] Home Exercises  [x] Fall Precautions  [x] Hip Precautions [] D/C Instruction Review  [] Hip Prosthesis Review  [x] Walker Management/Safety [] Adaptive Equipment as Needed       Interdisciplinary Collaboration:   o Physical Therapy Assistant  o Registered Nurse    After treatment position/precautions:   o Up in chair  o Bed/Chair-wheels locked  o Call light within reach  o RN notified    Compliance with Program/Exercises: Compliant most of the time, Will assess as treatment progresses. Recommendations/Intent for next treatment session:  Treatment next visit will focus on increasing Ms. Stevens's independence with bed mobility, transfers, gait training, strength/ROM exercises, modalities for pain, and patient education.       Total Treatment Duration:  PT Patient Time In/Time Out  Time In: 0930  Time Out: [address] ALIREZA Santizo

## 2021-03-12 NOTE — PROGRESS NOTES
Problem: Mobility Impaired (Adult and Pediatric)  Goal: *Acute Goals and Plan of Care (Insert Text)  Outcome: Progressing Towards Goal  Note: GOALS (1-4 days):  (1.)Ms. Stevens will move from supine to sit and sit to supine  in bed with STAND BY ASSIST.    (2.)Ms. Stevens will transfer from bed to chair and chair to bed with STAND BY ASSIST using the least restrictive device. (3.)Ms. Stevens will ambulate with STAND BY ASSIST for 150 feet with the least restrictive device. (4.)Ms. Stevens will ambulate up/down 4 steps with right railing with CONTACT GUARD ASSIST with no device. (5.)Ms. Stevens will state/observe JAMIL precautions with 0 verbal cues. ________________________________________________________________________________________________      PHYSICAL THERAPY JOINT CAMP JAMIL: Daily Note and PM 3/12/2021  INPATIENT: Hospital Day: 2  [place] / Product Type: Medicare /      NAME/AGE/GENDER: Vani Keenan is a 67 y.o. female   PRIMARY DIAGNOSIS:  Primary osteoarthritis of left hip [M16.12]   Procedure(s) and Anesthesia Type:     * LEFT HIP ARTHROPLASTY TOTAL/DEPUY - Spinal (Left)  ICD-10: Treatment Diagnosis:    · Pain in left hip (M25.552)  · Stiffness of Left Hip, Not elsewhere classified (M25.652)  · Difficulty in walking, Not elsewhere classified (R26.2)  · Other abnormalities of gait and mobility (R26.89)      ASSESSMENT:     Ms. Nuvai Helms presents with decreased strength L LE and limited functional mobility S/P L JAMIL. Reviewed hip precautions and needs further review. Lives alone and plans to discharge home with support of son. She will benefit from skilled therapy services to address the below problem list.   3/12 sitting in the recliner upon arrival.  Performs TH exercises with verbal cues and guidance to stay within hip precaution. Sit>stand with CGA. Walk to restroom using RW with CGA while working on normal gait pattern.   Independent with hygiene. Walk back to recliner with CGA, and needs in reach. 3/12 pm just went form chair>bed with CGA to lay back down. Performs TH exercises with some help in the bed. Left in supine with needs in reach. This section established at most recent assessment   PROBLEM LIST (Impairments causing functional limitations):  1. Decreased ADL/Functional Activities  2. Decreased Transfer Abilities  3. Decreased Ambulation Ability/Technique  4. Increased Pain  5. Decreased Knowledge of Precautions  6. Decreased [facility] with Home Exercise Program  7. Decreased strength   INTERVENTIONS PLANNED: (Benefits and precautions of physical therapy have been discussed with the patient.)  1. Bed Mobility  2. Cold  3. Gait Training  4. Home Exercise Program (HEP)  5. Range of Motion (ROM)  6. Therapeutic Activites  7. Therapeutic Exercise/Strengthening  8. Transfer Training     TREATMENT PLAN: Frequency/Duration: Follow patient BID for duration of hospital stay to address above goals. Rehabilitation Potential For Stated Goals: Good     RECOMMENDED REHABILITATION/EQUIPMENT: (at time of discharge pending progress): Continue Skilled Therapy and Home Health: Physical Therapy. HISTORY:   History of Present Injury/Illness (Reason for Referral):  S/P L JAMIL  Past Medical History/Comorbidities:   Ms. 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   Ms. Freida Pires  has a past surgical history that includes hx hysterectomy; hx lap cholecystectomy; hx knee replacement (Right); hx knee replacement (Left); and hx back surgery. Social History/Living Environment:   Home Environment: Private residence  # Steps to Enter: 4  Rails to Enter: Yes  [employer] : Right  One/Two Story Residence: One story  Living Alone: Yes  Support Systems: Child(clari)  Patient Expects to be Discharged to[de-identified] Private residence  Current DME Used/Available at Home: [address] [facility], straight; Shower chair;Walker, rolling  Tub or Shower Type: Tub/Shower combination    Prior Level of Function/Work/Activity:  Ambulating with RW   Number of Personal Factors/Comorbidities that affect the Plan of Care: 1-2: MODERATE COMPLEXITY   EXAMINATION:   Most Recent Physical Functioning:                            Bed Mobility  Rolling: Contact guard assistance  Supine to Sit: Contact guard assistance  Sit to Supine: Contact guard assistance    Transfers  Sit to Stand: Supervision  Stand to Sit: Supervision  Bed to Chair: Supervision    Balance  Sitting: Intact  Standing: Intact              Weight Bearing Status  Left Side Weight Bearing: As tolerated  Distance (ft): 40 Feet (ft)  Ambulation - Level of Assistance: Contact guard assistance  Assistive Device: Walker, rolling  Speed/Ekaterina: Pace decreased (<100 feet/min)  Step Length: Left shortened  Stance: Left decreased;Right decreased  Gait Abnormalities: Decreased step clearance        Braces/Orthotics: none    Left Hip Cold  Type: Cold/ice pack      Body Structures Involved:  1. Bones  2. Joints  3. Muscles Body Functions Affected:  1. Neuromusculoskeletal  2. Movement Related Activities and Participation Affected:  1. General Tasks and Demands  2.  Mobility   Number of elements that affect the Plan of Care: 3: MODERATE COMPLEXITY   CLINICAL PRESENTATION:   Presentation: Stable and uncomplicated: LOW COMPLEXITY   CLINICAL DECISION MAKIN [facility] [address] [id number] AM-PAC 6 Clicks   Basic Mobility Inpatient Short Form  How much difficulty does the patient currently have... Unable A Lot A Little None   1.  Turning over in bed (including adjusting bedclothes, sheets and blankets)?   [] 1   [] 2   [x] 3   [] 4   2.  Sitting down on and standing up from a chair with arms ( e.g., wheelchair, bedside commode, etc.)   [] 1   [] 2   [x] 3   [] 4   3.  Moving from lying on back to sitting on the side of the bed?   [] 1   [] 2   [x] 3   [] 4   How much help from another person does the patient currently need... Total A Lot A Little None   4.  Moving to and from a bed to a chair (including a wheelchair)?  [] 1   [] 2   [x] 3   [] 4   5.  Need to walk in hospital room?   [] 1   [] 2   [x] 3   [] 4   6.  Climbing 3-5 steps with a railing?   [] 1   [x] 2   [] 3   [] 4   © 2007, Trustees of [facility] [employer] [facility], under license to [employer]. All rights reserved     Score:  Initial: 17 Most Recent: X (Date: -- )    Interpretation of Tool:  Represents activities that are increasingly more difficult (i.e. Bed mobility, Transfers, Gait).    Medical Necessity:     · Patient demonstrates   · good  ·  rehab potential due to higher previous functional level.  Reason for Services/Other Comments:  · Patient   · continues to require present interventions due to patient's inability to perform exercises and functional mobility independently  · .   Use of outcome tool(s) and clinical judgement create a POC that gives a: Clear prediction of patient's progress: LOW COMPLEXITY            TREATMENT:   (In addition to Assessment/Re-Assessment sessions the following treatments were rendered)     Pre-treatment Symptoms/Complaints:  Doing fine  Pain Initial:   Pain Intensity 1: 4(about the same)  Post Session:      Therapeutic Exercise: (15 Minutes):  Exercises per grid below to improve mobility and strength.  Required minimal verbal and manual cues to promote proper body mechanics. Therapeutic Activity: (  8 Minutes ):  Therapeutic activities including chair>bed with CGA, will  keep her eyes closed through therapy. Date:  3/11/21 Date:  3/12   Date:     ACTIVITY/EXERCISE AM PM AM PM AM PM   GROUP THERAPY  []  []  []  []  []  []   Ankle Pumps  10 15 15     Quad Sets  10 15 15     Gluteal Sets  10 15 15     Hip ABd/ADduction  10 AA 15 aa 15 aa     Straight Leg Raises  ---       Knee Slides  10 AA 15 aa 15 aa     Short Arc Quads  10 15 15     Long Arc Quads   15 15     Chair Slides                  B = bilateral; AA = active assistive; A = active; P = passive      Treatment/Session Assessment:     Response to Treatment:  Making slow progress    Education:  [x] Home Exercises  [x] Fall Precautions  [x] Hip Precautions [] D/C Instruction Review  [] Hip Prosthesis Review  [x] Walker Management/Safety [] Adaptive Equipment as Needed       Interdisciplinary Collaboration:   o Physical Therapy Assistant  o Registered Nurse    After treatment position/precautions:   o Supine in bed  o Bed/Chair-wheels locked  o Call light within reach  o RN notified    Compliance with Program/Exercises: Compliant most of the time, Will assess as treatment progresses. Recommendations/Intent for next treatment session:  Treatment next visit will focus on increasing Ms. Stevens's independence with bed mobility, transfers, gait training, strength/ROM exercises, modalities for pain, and patient education.       Total Treatment Duration:  PT Patient Time In/Time Out  Time In: 1330  Time Out: One Farhan Santizo, PTA

## 2021-03-12 NOTE — PROGRESS NOTES
Problem: Self Care Deficits Care Plan (Adult)  Goal: *Acute Goals and Plan of Care (Insert Text)  Description: GOALS:   DISCHARGE GOALS (in preparation for going home/rehab):  3 days  1. Ms. Jannie Alva will perform one lower body dressing activity with minimal assistance with adaptive equipment to demonstrate improved functional mobility and safety. GOAL MET 3/12/2021  2. Ms. Jannie Alva will perform one lower body bathing activity with minimal  assistance with adaptive equipment to demonstrate improved functional mobility and safety. GOAL MET 3/12/2021  3. Ms. Jannie Alva will perform toileting/toilet transfer with contact guard assistance with adaptive equipment to demonstrate improved functional mobility and safety. GOAL MET 3/12/2021  4. Ms. Jannie Alva will perform shower transfer with contact guard assistance with adaptive equipment to demonstrate improved functional mobility and safety. GOAL MET 3/12/2021  5. Ms. Jannie Alva will state JAMIL precautions with two verbal cues to demonstrate improved functional mobility and safety. GOAL MET 3/12/2021  Outcome: Progressing Towards Goal    JOINT CAMP OCCUPATIONAL THERAPY TKA: Daily Note, Discharge, Treatment Day: 2nd and AM 3/12/2021  INPATIENT: Hospital Day: 2  [place] / Product Type: Medicare /      NAME/AGE/GENDER: Isabel Snyder is a 67 y.o. female   PRIMARY DIAGNOSIS:  Primary osteoarthritis of left hip [M16.12]   Procedure(s) and Anesthesia Type:     * LEFT HIP ARTHROPLASTY TOTAL/DEPUY - Spinal (Left)  ICD-10: Treatment Diagnosis:    · Pain in left hip (M25.552)  · Stiffness of Left Hip, Not elsewhere classified (V81.299)      ASSESSMENT:     Ms. Jannie Alva is s/p L JAMIL and presents with decreased weight bearing on L LE and decreased independence with functional mobility and activities of daily living.   Patient completed shower and dressing as charted below in ADL grid and is ambulating with rolling walker with supervision. Patient has met 5/5 goals and plans to return home with good family support. Family able to provide patient with appropriate level of assistance at this time. OT reviewed hip precautions throughout session. Patient instructed to call for assistance when needing to get up from recliner and all needs in reach. Patient verbalized understanding of call light. This section established at most recent assessment   PROBLEM LIST (Impairments causing functional limitations):  1. Decreased Strength  2. Decreased ADL/Functional Activities  3. Decreased Transfer Abilities  4. Increased Pain  5. Increased Fatigue  6. Decreased Flexibility/Joint Mobility  7. Decreased Knowledge of Precautions   INTERVENTIONS PLANNED: (Benefits and precautions of occupational therapy have been discussed with the patient.)  1. Activities of daily living training  2. Adaptive equipment training  3. Balance training  4. Clothing management  5. Donning&doffing training  6. Theraputic activity     TREATMENT PLAN: Frequency/Duration: Follow patient 1-2 times to address above goals. Rehabilitation Potential For Stated Goals: Good     RECOMMENDED REHABILITATION/EQUIPMENT: (at time of discharge pending progress): Continue Skilled Therapy. OCCUPATIONAL PROFILE AND HISTORY:   History of Present Injury/Illness (Reason for Referral): Pt presents this date s/p (left) TKA.     Past Medical History/Comorbidities:   Ms. 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 Ms. Fernando Del Angel  has a past surgical history that includes hx hysterectomy; hx lap cholecystectomy; hx knee replacement (Right); hx knee replacement (Left); and hx back surgery. Social History/Living Environment:   Home Environment: Private residence  # Steps to Enter: 4  Rails to Enter: Yes  Hand Rails : Right  One/Two Story Residence: One story  Living Alone: Yes  Support Systems: Child(clari)  Patient Expects to be Discharged to[de-identified] Private residence  Current DME Used/Available at Home: Trudell Organ, straight, Shower chair, Walker, rolling  Tub or Shower Type: Tub/Shower combination  Prior Level of Function/Work/Activity:  Independent lives alone son check on her daily     Number of Personal Factors/Comorbidities that affect the Plan of Care: Brief history (0):  LOW COMPLEXITY   ASSESSMENT OF OCCUPATIONAL PERFORMANCE[de-identified]   Most Recent Physical Functioning:   Balance  Sitting: Intact  Standing: Intact                              Mental Status  Neurologic State: Alert  Orientation Level: Oriented X4  Cognition: Appropriate for age attention/concentration; Appropriate decision making  Perception: Appears intact  Perseveration: No perseveration noted  Safety/Judgement: Fall prevention                Basic ADLs (From Assessment) Complex ADLs (From Assessment)   Basic ADL  Feeding: Independent  Oral Facial Hygiene/Grooming: Supervision  Bathing: Moderate assistance  Type of Bath: Chlorhexidine (CHG), Full, Shower  Upper Body Dressing: Supervision  Lower Body Dressing: Moderate assistance  Toileting: Minimum assistance     Grooming/Bathing/Dressing Activities of Daily Living     Cognitive Retraining  Safety/Judgement: Fall prevention   Upper Body Bathing  Bathing Assistance: Modified independent  Position Performed: Seated in chair  Adaptive Equipment: Shower chair; Long handled sponge     Lower Body Bathing  Bathing Assistance: Modified independent  Perineal  : Independent  Lower Body : Modified independent  Adaptive Equipment: Long handled sponge; Shower chair Toileting  Bladder Hygiene: Independent  Adaptive Equipment: Elevated seat   Upper Body Dressing Assistance  Dressing Assistance: Independent  Pullover Shirt: Independent Functional Transfers  Toilet Transfer : Modified independent  Shower Transfer: Modified independent   Lower Body Dressing Assistance  Dressing Assistance: Modified independent  Underpants: Modified independent  Socks: Modified independent  Adaptive Equipment Used: Long handled shoe horn;Sock aid;Reacher Bed/Mat Mobility  Supine to Sit: Supervision  Sit to Stand: Supervision  Stand to Sit: Supervision  Bed to Chair: Supervision         Physical Skills Involved:  1. Range of Motion  2. Balance  3. Strength Cognitive Skills Affected (resulting in the inability to perform in a timely and safe manner): 1. none Psychosocial Skills Affected:  1. Environmental Adaptation   Number of elements that affect the Plan of Care: 3-5:  MODERATE COMPLEXITY   CLINICAL DECISION MAKING:   [facility] AM-PAC 6 Clicks   Daily Activity Inpatient Short Form  How much help from another person does the patient currently need. .. Total A Lot A Little None   1. Putting on and taking off regular lower body clothing? [] 1   [x] 2   [] 3   [] 4   2. Bathing (including washing, rinsing, drying)? [] 1   [x] 2   [] 3   [] 4   3. Toileting, which includes using toilet, bedpan or urinal?   [] 1   [] 2   [x] 3   [] 4   4. Putting on and taking off regular upper body clothing? [] 1   [] 2   [] 3   [x] 4   5. Taking care of personal grooming such as brushing teeth? [] 1   [] 2   [] 3   [x] 4   6. Eating meals? [] 1   [] 2   [] 3   [x] 4   © 2007, Trustees of [facility], under license to [employer].  All rights reserved     Score:  Initial: 19 Most Recent: X (Date: -- )    Interpretation of Tool:  Represents activities that are increasingly more difficult (i.e. Bed mobility, Transfers, Gait). Use of outcome tool(s) and clinical judgement create a POC that gives a: LOW COMPLEXITY            TREATMENT:   (In addition to Assessment/Re-Assessment sessions the following treatments were rendered)     Pre-treatment Symptoms/Complaints:  pt up in room to tolerated   Pain: Initial:   Pain Intensity 1: 3  Pain Location 1: Hip  Pain Intervention(s) 1: Repositioned, Shower, Ice 0 Post Session:  0     Self Care: (24 min): Procedure(s) (per grid) utilized to improve and/or restore self-care/home management as related to dressing, bathing, toileting and grooming. Required minimal verbal and manual cueing to facilitate activities of daily living skills and compensatory activities. Treatment/Session Assessment:     Response to Treatment:  pt up in room and bathroom tolerated well. Education:  [] Home Exercises  [x] Fall Precautions  [] Hip Precautions [] Going Home Video  [x] Knee/Hip Prosthesis Review  [x] Walker Management/Safety [x] Adaptive Equipment as Needed       Interdisciplinary Collaboration:   o Physical Therapist  o Certified Occupational Therapy Assistant  o Registered Nurse    After treatment position/precautions:   o Up in chair  o Bed/Chair-wheels locked  o Call light within reach  o RN notified     Compliance with Program/Exercises: Compliant all of the time, Will assess as treatment progresses. Pt doing well all goals met and will do well at home with support from family. Patient will be discharged home with home health PT. No further Occupational Therapy warranted, will discharge Occupational Therapy services.       Total Treatment Duration:  OT Patient Time In/Time Out  Time In: 6517  Time Out: [address] Asya Bruno

## 2021-03-12 NOTE — PROGRESS NOTES
Problem: Falls - Risk of  Goal: *Absence of Falls  Description: Document Virginia Mccauley Fall Risk and appropriate interventions in the flowsheet.   Outcome: Progressing Towards Goal  Note: Fall Risk Interventions:  Mobility Interventions: OT consult for ADLs, Patient to call before getting OOB, PT Consult for mobility concerns, PT Consult for assist device competence, Strengthening exercises (ROM-active/passive), Utilize walker, cane, or other assistive device         Medication Interventions: Assess postural VS orthostatic hypotension, Patient to call before getting OOB, Teach patient to arise slowly    Elimination Interventions: Call light in reach, Patient to call for help with toileting needs, Toileting schedule/hourly rounds              Problem: Hip Replacement: Day of Surgery/Unit  Goal: Activity/Safety  Outcome: Progressing Towards Goal  Goal: Consults, if ordered  Outcome: Progressing Towards Goal  Goal: Diagnostic Test/Procedures  Outcome: Progressing Towards Goal  Goal: Nutrition/Diet  Outcome: Progressing Towards Goal  Goal: Medications  Outcome: Progressing Towards Goal  Goal: Respiratory  Outcome: Progressing Towards Goal  Goal: Treatments/Interventions/Procedures  Outcome: Progressing Towards Goal  Goal: Psychosocial  Outcome: Progressing Towards Goal  Goal: *Initiate mobility  Outcome: Progressing Towards Goal  Goal: *Optimal pain control at patient's stated goal  Outcome: Progressing Towards Goal  Goal: *Hemodynamically stable  Outcome: Progressing Towards Goal

## 2021-03-13 VITALS
HEIGHT: 65 IN | HEART RATE: 111 BPM | SYSTOLIC BLOOD PRESSURE: 184 MMHG | WEIGHT: 187.2 LBS | TEMPERATURE: 98.2 F | RESPIRATION RATE: 18 BRPM | BODY MASS INDEX: 31.19 KG/M2 | DIASTOLIC BLOOD PRESSURE: 72 MMHG | OXYGEN SATURATION: 90 %

## 2021-03-13 PROCEDURE — 97530 THERAPEUTIC ACTIVITIES: CPT

## 2021-03-13 PROCEDURE — 74011250637 HC RX REV CODE- 250/637: Performed by: ORTHOPAEDIC SURGERY

## 2021-03-13 PROCEDURE — 97110 THERAPEUTIC EXERCISES: CPT

## 2021-03-13 PROCEDURE — 74011250637 HC RX REV CODE- 250/637: Performed by: PHYSICIAN ASSISTANT

## 2021-03-13 RX ADMIN — GABAPENTIN 800 MG: 400 CAPSULE ORAL at 08:22

## 2021-03-13 RX ADMIN — METOPROLOL TARTRATE 25 MG: 25 TABLET, FILM COATED ORAL at 08:23

## 2021-03-13 RX ADMIN — Medication 1 AMPULE: at 08:24

## 2021-03-13 RX ADMIN — HYDROCODONE BITARTRATE AND ACETAMINOPHEN 1 TABLET: 7.5; 325 TABLET ORAL at 08:23

## 2021-03-13 RX ADMIN — FLUOXETINE 20 MG: 20 CAPSULE ORAL at 08:23

## 2021-03-13 RX ADMIN — BUSPIRONE HYDROCHLORIDE 10 MG: 5 TABLET ORAL at 08:23

## 2021-03-13 RX ADMIN — Medication 81 MG: at 08:23

## 2021-03-13 RX ADMIN — LOSARTAN POTASSIUM 25 MG: 25 TABLET ORAL at 08:23

## 2021-03-13 RX ADMIN — FENOFIBRATE 160 MG: 160 TABLET ORAL at 08:23

## 2021-03-13 RX ADMIN — HYDROCODONE BITARTRATE AND ACETAMINOPHEN 1 TABLET: 7.5; 325 TABLET ORAL at 03:37

## 2021-03-13 RX ADMIN — DOCUSATE SODIUM 50MG AND SENNOSIDES 8.6MG 2 TABLET: 8.6; 5 TABLET, FILM COATED ORAL at 08:23

## 2021-03-13 RX ADMIN — LEVOTHYROXINE SODIUM 75 MCG: 0.07 TABLET ORAL at 06:11

## 2021-03-13 NOTE — PROGRESS NOTES
Care Management Interventions  PCP Verified by CM: Yes  Mode of Transport at Discharge: Self  Transition of Care Consult (CM Consult): [address]: Yes  Discharge Durable Medical Equipment: Yes(3in[facility])  Physical Therapy Consult: Yes  Occupational Therapy Consult: Yes  Current Support Network: Own Home  Confirm Follow Up Transport: Family  The Plan for Transition of Care is Related to the Following Treatment Goals : Return to independent function.    The Patient and/or Patient Representative was Provided with a Choice of Provider and Agrees with the Discharge Plan?: Yes  Freedom of Choice List was Provided with Basic Dialogue that Supports the Patient's Individualized Plan of Care/Goals, Treatment Preferences and Shares the Quality Data Associated with the Providers?: Yes  Discharge Location  Discharge Placement: Home with home health

## 2021-03-13 NOTE — PROGRESS NOTES
Shift assessment completed. Alert and oriented x4. Pt resting in bed. Left hip Aquacel is clean dry and intact. Sensation present, plantar/dorsiflexion moderate, pulses 2+ bilaterally in lower extremities. Lung sounds clear bilaterally. Pt voiding. Bed locked, in lowest position, call light within reach.

## 2021-03-13 NOTE — PROGRESS NOTES
Pt in bed resting, bed low and locked, call light within reach, gripper socks on, side rails up x3. Shift assessment complete, no needs at this time, will continue to monitor.

## 2021-03-13 NOTE — PROGRESS NOTES
2021         Post Op day: 2 Days Post-Op   Admit Diagnosis: Primary osteoarthritis of left hip [M16.12]  Osteoarthritis of left hip [M16.12]  LAB:    No results found for this or any previous visit (from the past 24 hour(s)). Vital Signs:    Patient Vitals for the past 8 hrs:   BP Temp Pulse Resp SpO2   21 0650 (!) 184/72 98.2 °F (36.8 °C) (!) 111 18 90 %   21 0336 110/77 97.4 °F (36.3 °C) 91 18 90 %     Temp (24hrs), Av.4 °F (36.9 °C), Min:97.4 °F (36.3 °C), Max:99.9 °F (37.7 °C)    Pain Control:   Pain Assessment  Pain Scale 1: Numeric (0 - 10)  Pain Intensity 1: 6  Pain Location 1: Hip  Pain Orientation 1: Left  Pain Description 1: Aching, Constant, Dull  Pain Intervention(s) 1: Medication (see MAR), Ice, Repositioned  Subjective: Doing well, pain is fairly well controlled, no complaints     Objective:  No Acute Distress, Alert and Oriented, left hip dressing is C/D/I. Neurovascular exam is normal       Assessment:   Patient Active Problem List   Diagnosis Code    Primary insomnia F51.01    Generalized anxiety disorder F41.1    Unresolved grief F43.21    Adjustment disorder with anxious mood F43.22    Recurrent major depressive disorder, in partial remission ([facility] F33.41    Overweight (BMI 25.0-29. 9) E66.3    Osteoarthritis of left hip M16.12       Status Post Procedure(s) (LRB):  LEFT HIP ARTHROPLASTY TOTAL/DEPUY (Left)        Plan: Continue Physical Therapy, Monitor Hgb. ASA/ SC Ds for DVT prophylaxis. D/c home this afternoon.    Signed By: SANTOS Barnes

## 2021-03-13 NOTE — PROGRESS NOTES
Problem: Mobility Impaired (Adult and Pediatric)  Goal: *Acute Goals and Plan of Care (Insert Text)  Outcome: Progressing Towards Goal  Note: GOALS (1-4 days):  (1.)Ms. Stevens will move from supine to sit and sit to supine  in bed with STAND BY ASSIST.    (2.)Ms. Stevens will transfer from bed to chair and chair to bed with STAND BY ASSIST using the least restrictive device. (3.)Ms. Stevens will ambulate with STAND BY ASSIST for 150 feet with the least restrictive device. (4.)Ms. Stevens will ambulate up/down 4 steps with right railing with CONTACT GUARD ASSIST with no device. (5.)Ms. Stevens will state/observe JAMIL precautions with 0 verbal cues. ________________________________________________________________________________________________      PHYSICAL THERAPY JOINT CAMP JAMIL: Daily Note and AM 3/13/2021  INPATIENT: Hospital Day: 3  [place] / Product Type: Medicare /      NAME/AGE/GENDER: Anup Cade is a 67 y.o. female   PRIMARY DIAGNOSIS:  Primary osteoarthritis of left hip [M16.12]   Procedure(s) and Anesthesia Type:     * LEFT HIP ARTHROPLASTY TOTAL/DEPUY - Spinal (Left)  ICD-10: Treatment Diagnosis:    · Pain in left hip (M25.552)  · Stiffness of Left Hip, Not elsewhere classified (M25.652)  · Difficulty in walking, Not elsewhere classified (R26.2)  · Other abnormalities of gait and mobility (R26.89)      ASSESSMENT:     Ms. Paola Gustafson was supine in bed on arrival. She is agreeable to PT and plans to go home today with HHPT. Exercises performed while supine in bed. She required increased time to perform bed mobility. ambulated to and from restroom and was able to perform all self care without assist. Stood at sink to wash her hands. Pt would keep her eyes closed at times during treatment. Pt ambulated down hallway with SBA with slow gait. Ambulated up and down rehab stairs with SBA and verbal cues.      This section established at most recent assessment   PROBLEM LIST (Impairments causing functional limitations):  1. Decreased ADL/Functional Activities  2. Decreased Transfer Abilities  3. Decreased Ambulation Ability/Technique  4. Increased Pain  5. Decreased Knowledge of Precautions  6. Decreased [place] with Home Exercise Program  7. Decreased strength   INTERVENTIONS PLANNED: (Benefits and precautions of physical therapy have been discussed with the patient.)  1. Bed Mobility  2. Cold  3. Gait Training  4. Home Exercise Program (HEP)  5. Range of Motion (ROM)  6. Therapeutic Activites  7. Therapeutic Exercise/Strengthening  8. Transfer Training     TREATMENT PLAN: Frequency/Duration: Follow patient BID for duration of hospital stay to address above goals. Rehabilitation Potential For Stated Goals: Good     RECOMMENDED REHABILITATION/EQUIPMENT: (at time of discharge pending progress): Continue Skilled Therapy and Home Health: Physical Therapy. HISTORY:   History of Present Injury/Illness (Reason for Referral):  S/P L JAMIL  Past Medical History/Comorbidities:   Ms. 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 Ms. Fernando Del Angel  has a past surgical history that includes hx hysterectomy; hx lap cholecystectomy; hx knee replacement (Right); hx knee replacement (Left); and hx back surgery.    Social History/Living Environment:   Home Environment: Private residence  # Steps to Enter: 4  Rails to Enter: Yes  [employer] : Right  One/Two Story Residence: One story  Living Alone: Yes  Support Systems: Child(clari)  Patient Expects to be Discharged to[de-identified] Private residence  Current DME Used/Available at Home: Hung Lozano, straight; Shower chair;Walker, rolling  Tub or Shower Type: Tub/Shower combination    Prior Level of Function/Work/Activity:  Ambulating with RW   Number of Personal Factors/Comorbidities that affect the Plan of Care: 1-2: MODERATE COMPLEXITY   EXAMINATION:   Most Recent Physical Functioning:                            Bed Mobility  Supine to Sit: Minimum assistance    Transfers  Sit to Stand: Stand-by assistance  Stand to Sit: Stand-by assistance  Bed to Chair: Stand-by assistance    Balance  Sitting: Intact  Standing: Intact              Weight Bearing Status  Left Side Weight Bearing: As tolerated  Distance (ft): 100 Feet (ft)(additional 10' x2)  Ambulation - Level of Assistance: Stand-by assistance  Assistive Device: Walker, rolling  Speed/Ekaterina: Delayed;Pace decreased (<100 feet/min)  Step Length: Left shortened;Right shortened  Stance: Left decreased  Gait Abnormalities: Decreased step clearance        Braces/Orthotics: none    Left Hip Cold  Type: Cold/ice pack      Body Structures Involved:  1. Bones  2. Joints  3. Muscles Body Functions Affected:  1. Neuromusculoskeletal  2. Movement Related Activities and Participation Affected:  1. General Tasks and Demands  2. Mobility   Number of elements that affect the Plan of Care: 3: MODERATE COMPLEXITY   CLINICAL PRESENTATION:   Presentation: Stable and uncomplicated: LOW COMPLEXITY   CLINICAL DECISION MAKIN [facility] [address] [address] AM-PAC 6 Clicks   Basic Mobility Inpatient Short Form  How much difficulty does the patient currently have. .. Unable A Lot A Little None   1. Turning over in bed (including adjusting bedclothes, sheets and blankets)? [] 1   [] 2   [x] 3   [] 4   2.   Sitting down on and standing up from a chair with arms ( e.g., wheelchair, bedside commode, etc.)  [] 1   [] 2   [x] 3   [] 4   3.  Moving from lying on back to sitting on the side of the bed?   [] 1   [] 2   [x] 3   [] 4   How much help from another person does the patient currently need... Total A Lot A Little None   4.  Moving to and from a bed to a chair (including a wheelchair)?  [] 1   [] 2   [x] 3   [] 4   5.  Need to walk in hospital room?   [] 1   [] 2   [x] 3   [] 4   6.  Climbing 3-5 steps with a railing?   [] 1   [x] 2   [] 3   [] 4   © 2007, Trustees of [facility], under license to [employer]. All rights reserved     Score:  Initial: 17 Most Recent: X (Date: -- )    Interpretation of Tool:  Represents activities that are increasingly more difficult (i.e. Bed mobility, Transfers, Gait).    Medical Necessity:     · Patient demonstrates   · good  ·  rehab potential due to higher previous functional level.  Reason for Services/Other Comments:  · Patient   · continues to require present interventions due to patient's inability to perform exercises and functional mobility independently  · .   Use of outcome tool(s) and clinical judgement create a POC that gives a: Clear prediction of patient's progress: LOW COMPLEXITY            TREATMENT:   (In addition to Assessment/Re-Assessment sessions the following treatments were rendered)     Pre-treatment Symptoms/Complaints:  Doing better today  Pain Initial:  Hip soreness     Post Session: hip soreness     Therapeutic Activity (30 Minutes): Therapeutic activity included Supine to Sit, Scooting, Ambulation on level ground, Sitting balance  and Standing balance to improve functional Mobility, Strength and Activity tolerance.  Therapeutic Exercise (15 Minutes): Therapeutic exercises noted below to improve functional activity tolerance, strength and mobility.      Date:  3/11/21 Date:  3/12   Date:  3/13/21   ACTIVITY/EXERCISE AM PM AM PM AM PM   GROUP THERAPY  []  []  []  []  []  []   Ankle Pumps  10 15 15 15    Quad Sets  10 15 15 15    Gluteal Sets  10  15 15 15    Hip ABd/ADduction  10 AA 15 aa 15 aa 15    Straight Leg Raises  ---       Knee Slides  10 AA 15 aa 15 aa 15    Short Arc Quads  10 15 15 15    Long Arc Quads   15 15 15    Chair Slides                  B = bilateral; AA = active assistive; A = active; P = passive      Treatment/Session Assessment:     Response to Treatment:  Did better today. Education:  [x] Home Exercises  [x] Fall Precautions  [x] Hip Precautions [] D/C Instruction Review  [] Hip Prosthesis Review  [x] Walker Management/Safety [] Adaptive Equipment as Needed       Interdisciplinary Collaboration:   o Physical Therapy Assistant  o Registered Nurse    After treatment position/precautions:   o Up in chair  o Bed/Chair-wheels locked  o Call light within reach  o RN notified    Compliance with Program/Exercises: Compliant most of the time, Will assess as treatment progresses. Recommendations/Intent for next treatment session:  Treatment next visit will focus on increasing Ms. Stevens's independence with bed mobility, transfers, gait training, strength/ROM exercises, modalities for pain, and patient education.       Total Treatment Duration:  PT Patient Time In/Time Out  Time In: 0920  Time Out: [address] [facility], [facility]

## 2021-03-14 ENCOUNTER — HOME CARE VISIT (OUTPATIENT)
Dept: SCHEDULING | Facility: HOME HEALTH | Age: 76
End: 2021-03-14
Payer: MEDICARE

## 2021-03-14 VITALS
HEART RATE: 82 BPM | RESPIRATION RATE: 16 BRPM | DIASTOLIC BLOOD PRESSURE: 62 MMHG | TEMPERATURE: 97.6 F | SYSTOLIC BLOOD PRESSURE: 118 MMHG

## 2021-03-14 PROCEDURE — G0151 HHCP-SERV OF PT,EA 15 MIN: HCPCS

## 2021-03-14 PROCEDURE — 3331090001 HH PPS REVENUE CREDIT

## 2021-03-14 PROCEDURE — 400018 HH-NO PAY CLAIM PROCEDURE

## 2021-03-14 PROCEDURE — 3331090002 HH PPS REVENUE DEBIT

## 2021-03-14 PROCEDURE — 400013 HH SOC

## 2021-03-15 PROCEDURE — 3331090002 HH PPS REVENUE DEBIT

## 2021-03-15 PROCEDURE — 3331090001 HH PPS REVENUE CREDIT

## 2021-03-16 ENCOUNTER — HOME CARE VISIT (OUTPATIENT)
Dept: SCHEDULING | Facility: HOME HEALTH | Age: 76
End: 2021-03-16
Payer: MEDICARE

## 2021-03-16 VITALS
HEART RATE: 74 BPM | SYSTOLIC BLOOD PRESSURE: 130 MMHG | DIASTOLIC BLOOD PRESSURE: 70 MMHG | RESPIRATION RATE: 17 BRPM | OXYGEN SATURATION: 96 % | TEMPERATURE: 97.5 F

## 2021-03-16 PROCEDURE — 3331090001 HH PPS REVENUE CREDIT

## 2021-03-16 PROCEDURE — 3331090002 HH PPS REVENUE DEBIT

## 2021-03-16 PROCEDURE — G0157 HHC PT ASSISTANT EA 15: HCPCS

## 2021-03-17 PROCEDURE — 3331090002 HH PPS REVENUE DEBIT

## 2021-03-17 PROCEDURE — 3331090001 HH PPS REVENUE CREDIT

## 2021-03-18 ENCOUNTER — HOME CARE VISIT (OUTPATIENT)
Dept: SCHEDULING | Facility: HOME HEALTH | Age: 76
End: 2021-03-18
Payer: MEDICARE

## 2021-03-18 VITALS
SYSTOLIC BLOOD PRESSURE: 130 MMHG | RESPIRATION RATE: 16 BRPM | DIASTOLIC BLOOD PRESSURE: 70 MMHG | TEMPERATURE: 98.2 F | HEART RATE: 81 BPM

## 2021-03-18 PROCEDURE — 3331090002 HH PPS REVENUE DEBIT

## 2021-03-18 PROCEDURE — G0157 HHC PT ASSISTANT EA 15: HCPCS

## 2021-03-18 PROCEDURE — 3331090001 HH PPS REVENUE CREDIT

## 2021-03-19 PROCEDURE — 3331090002 HH PPS REVENUE DEBIT

## 2021-03-19 PROCEDURE — 3331090001 HH PPS REVENUE CREDIT

## 2021-03-20 PROCEDURE — 3331090002 HH PPS REVENUE DEBIT

## 2021-03-20 PROCEDURE — 3331090001 HH PPS REVENUE CREDIT

## 2021-03-21 PROCEDURE — 3331090001 HH PPS REVENUE CREDIT

## 2021-03-21 PROCEDURE — 3331090002 HH PPS REVENUE DEBIT

## 2021-03-22 PROCEDURE — 3331090002 HH PPS REVENUE DEBIT

## 2021-03-22 PROCEDURE — 3331090001 HH PPS REVENUE CREDIT

## 2021-03-23 ENCOUNTER — HOME CARE VISIT (OUTPATIENT)
Dept: SCHEDULING | Facility: HOME HEALTH | Age: 76
End: 2021-03-23
Payer: MEDICARE

## 2021-03-23 VITALS
HEART RATE: 65 BPM | TEMPERATURE: 97 F | RESPIRATION RATE: 17 BRPM | OXYGEN SATURATION: 96 % | DIASTOLIC BLOOD PRESSURE: 70 MMHG | SYSTOLIC BLOOD PRESSURE: 124 MMHG

## 2021-03-23 PROCEDURE — 3331090002 HH PPS REVENUE DEBIT

## 2021-03-23 PROCEDURE — G0157 HHC PT ASSISTANT EA 15: HCPCS

## 2021-03-23 PROCEDURE — 3331090001 HH PPS REVENUE CREDIT

## 2021-03-24 PROCEDURE — 3331090002 HH PPS REVENUE DEBIT

## 2021-03-24 PROCEDURE — 3331090001 HH PPS REVENUE CREDIT

## 2021-03-25 ENCOUNTER — HOME CARE VISIT (OUTPATIENT)
Dept: SCHEDULING | Facility: HOME HEALTH | Age: 76
End: 2021-03-25
Payer: MEDICARE

## 2021-03-25 VITALS
HEART RATE: 67 BPM | TEMPERATURE: 97.7 F | SYSTOLIC BLOOD PRESSURE: 130 MMHG | OXYGEN SATURATION: 98 % | RESPIRATION RATE: 17 BRPM | DIASTOLIC BLOOD PRESSURE: 70 MMHG

## 2021-03-25 PROCEDURE — 3331090002 HH PPS REVENUE DEBIT

## 2021-03-25 PROCEDURE — G0157 HHC PT ASSISTANT EA 15: HCPCS

## 2021-03-25 PROCEDURE — 3331090001 HH PPS REVENUE CREDIT

## 2021-03-26 PROCEDURE — 3331090001 HH PPS REVENUE CREDIT

## 2021-03-26 PROCEDURE — 3331090002 HH PPS REVENUE DEBIT

## 2021-03-27 PROCEDURE — 3331090002 HH PPS REVENUE DEBIT

## 2021-03-27 PROCEDURE — 3331090001 HH PPS REVENUE CREDIT

## 2021-03-28 PROCEDURE — 3331090001 HH PPS REVENUE CREDIT

## 2021-03-28 PROCEDURE — 3331090002 HH PPS REVENUE DEBIT

## 2021-03-29 PROCEDURE — 3331090002 HH PPS REVENUE DEBIT

## 2021-03-29 PROCEDURE — 3331090001 HH PPS REVENUE CREDIT

## 2021-03-30 ENCOUNTER — HOME CARE VISIT (OUTPATIENT)
Dept: SCHEDULING | Facility: HOME HEALTH | Age: 76
End: 2021-03-30
Payer: MEDICARE

## 2021-03-30 VITALS
DIASTOLIC BLOOD PRESSURE: 70 MMHG | SYSTOLIC BLOOD PRESSURE: 120 MMHG | TEMPERATURE: 97.5 F | HEART RATE: 65 BPM | RESPIRATION RATE: 17 BRPM

## 2021-03-30 PROCEDURE — 3331090001 HH PPS REVENUE CREDIT

## 2021-03-30 PROCEDURE — 3331090002 HH PPS REVENUE DEBIT

## 2021-03-30 PROCEDURE — G0157 HHC PT ASSISTANT EA 15: HCPCS

## 2021-03-31 PROCEDURE — 3331090001 HH PPS REVENUE CREDIT

## 2021-03-31 PROCEDURE — 3331090002 HH PPS REVENUE DEBIT

## 2021-04-01 ENCOUNTER — HOME CARE VISIT (OUTPATIENT)
Dept: SCHEDULING | Facility: HOME HEALTH | Age: 76
End: 2021-04-01
Payer: MEDICARE

## 2021-04-01 VITALS
RESPIRATION RATE: 17 BRPM | DIASTOLIC BLOOD PRESSURE: 68 MMHG | TEMPERATURE: 97.6 F | SYSTOLIC BLOOD PRESSURE: 130 MMHG | OXYGEN SATURATION: 96 % | HEART RATE: 65 BPM

## 2021-04-01 PROCEDURE — G0157 HHC PT ASSISTANT EA 15: HCPCS

## 2021-04-01 PROCEDURE — 3331090002 HH PPS REVENUE DEBIT

## 2021-04-01 PROCEDURE — 3331090001 HH PPS REVENUE CREDIT

## 2021-04-02 PROCEDURE — 3331090002 HH PPS REVENUE DEBIT

## 2021-04-02 PROCEDURE — 3331090001 HH PPS REVENUE CREDIT

## 2021-04-03 ENCOUNTER — HOSPITAL ENCOUNTER (EMERGENCY)
Age: 76
Discharge: HOME | End: 2021-04-03
Attending: STUDENT IN AN ORGANIZED HEALTH CARE EDUCATION/TRAINING PROGRAM
Payer: MEDICARE

## 2021-04-03 ENCOUNTER — APPOINTMENT (OUTPATIENT)
Dept: GENERAL RADIOLOGY | Age: 76
End: 2021-04-03
Attending: STUDENT IN AN ORGANIZED HEALTH CARE EDUCATION/TRAINING PROGRAM
Payer: MEDICARE

## 2021-04-03 VITALS
SYSTOLIC BLOOD PRESSURE: 137 MMHG | TEMPERATURE: 97.9 F | DIASTOLIC BLOOD PRESSURE: 78 MMHG | HEIGHT: 65 IN | HEART RATE: 63 BPM | OXYGEN SATURATION: 96 % | WEIGHT: 190 LBS | BODY MASS INDEX: 31.65 KG/M2 | RESPIRATION RATE: 21 BRPM

## 2021-04-03 DIAGNOSIS — S73.005A DISLOCATION OF LEFT HIP, INITIAL ENCOUNTER (HCC): Primary | ICD-10-CM

## 2021-04-03 PROCEDURE — 75810000301 HC ER LEVEL 1 CLOSED TREATMNT FRACTURE/DISLOCATION

## 2021-04-03 PROCEDURE — 74011250636 HC RX REV CODE- 250/636: Performed by: STUDENT IN AN ORGANIZED HEALTH CARE EDUCATION/TRAINING PROGRAM

## 2021-04-03 PROCEDURE — 99284 EMERGENCY DEPT VISIT MOD MDM: CPT

## 2021-04-03 PROCEDURE — 3331090001 HH PPS REVENUE CREDIT

## 2021-04-03 PROCEDURE — 99285 EMERGENCY DEPT VISIT HI MDM: CPT

## 2021-04-03 PROCEDURE — 73502 X-RAY EXAM HIP UNI 2-3 VIEWS: CPT

## 2021-04-03 PROCEDURE — 3331090002 HH PPS REVENUE DEBIT

## 2021-04-03 RX ORDER — PROPOFOL 10 MG/ML
0.5 INJECTION, EMULSION INTRAVENOUS
Status: COMPLETED | OUTPATIENT
Start: 2021-04-03 | End: 2021-04-03

## 2021-04-03 RX ADMIN — PROPOFOL 20 MG: 10 INJECTION, EMULSION INTRAVENOUS at 14:07

## 2021-04-03 RX ADMIN — PROPOFOL 43.1 MG: 10 INJECTION, EMULSION INTRAVENOUS at 14:05

## 2021-04-03 NOTE — ED PROVIDER NOTES
69-year-old female history of left hip replacement on 3/11/2021. Patient presents to the ER as a transfer from [facility] patient was found to have a dislocated left prosthetic hip. Patient's orthopedic call the ER and advised the patient will be transferred here. Patient reports pain to left hip. States she was shaving her legs when she had a sudden onset of severe pain to left hip and heard a pop. Reports normal sensation and muscle strength to her distal left lower extremity. Past Medical History:   Diagnosis Date    Arthritis     osteo-- all over    Atrial fibrillation ([address] 2016    per cardio note dated 8/16/16= had a single episode of paroxysmal atrial fibrillation in hospital under stress. No recurrence. CHADS2-vasc of 1. NO change in Rx. Continue BBlocker. No need for anticoag.  BMI 31.0-31.9,adult     Cataract of both eyes     CHF (congestive heart failure) ([address]     Per PCP office note dated 12/08/2020;  lasix as needed    Chronic pain     back pain    Dementia ([address]     per PCP office note dated 12/08/2020, aricept daily. Pt states she tends to forget names and has trouble remenbering names of certain items.     Generalized anxiety disorder 11/17/2016    GERD (gastroesophageal reflux disease)     hiatal hernia, managed with otc meds prn     High blood pressure     on med for control     High cholesterol     on med for control    Insomnia secondary to anxiety 11/17/2016    Irregular heart beat     Left knee pain     Major depressive disorder, recurrent ([address] 11/17/2016    managed with meds     Neuropathy     Bilateral feet     Poor historian     Prediabetes     no meds or bs checks at home; A1C=5.7 on 02/16/21    PUD (peptic ulcer disease)     hiatal hernia; denies ulcers     Thyroid disease     hypo; on med for control     Vitamin B 12 deficiency        Past Surgical History:   Procedure Laterality Date    HX BACK SURGERY      spinal cord stimulator placed and then removed     HX HYSTERECTOMY      HX KNEE REPLACEMENT Right     HX KNEE REPLACEMENT Left     HX LAP CHOLECYSTECTOMY           Family History:   Problem Relation Age of Onset    Cancer Mother     Heart Attack Father     Hypertension Father     Kidney Disease Father     Tuberculosis Father        Social History     Socioeconomic History    Marital status:      Spouse name: Not on file    Number of children: Not on file    Years of education: Not on file    Highest education level: Not on file   Occupational History    Not on file   Social Needs    Financial resource strain: Not on file    Food insecurity     Worry: Not on file     Inability: Not on file    Transportation needs     Medical: Not on file     Non-medical: Not on file   Tobacco Use    Smoking status: Never Smoker    Smokeless tobacco: Never Used   Substance and Sexual Activity    Alcohol use: No    Drug use: No    Sexual activity: Not on file   Lifestyle    Physical activity     Days per week: Not on file     Minutes per session: Not on file    Stress: Not on file   Relationships    Social connections     Talks on phone: Not on file     Gets together: Not on file     Attends Scientologist service: Not on file     Active member of club or organization: Not on file     Attends meetings of clubs or organizations: Not on file     Relationship status: Not on file    Intimate partner violence     Fear of current or ex partner: Not on file     Emotionally abused: Not on file     Physically abused: Not on file     Forced sexual activity: Not on file   Other Topics Concern    Not on file   Social History Narrative    Not on file         ALLERGIES: Morphine and Oxycodone    Review of Systems   Constitutional: Negative for chills and fever. HENT: Negative for sinus pressure and sore throat. Eyes: Negative for visual disturbance. Respiratory: Negative for cough, chest tightness and shortness of breath.     Cardiovascular: Negative for chest pain. Gastrointestinal: Negative for abdominal pain, diarrhea, nausea and vomiting. Endocrine: Negative for polyuria. Genitourinary: Negative for difficulty urinating and dysuria. Musculoskeletal: Positive for arthralgias. Negative for neck pain and neck stiffness. Skin: Negative for rash. Neurological: Negative for speech difficulty, weakness, numbness and headaches. Psychiatric/Behavioral: Negative for confusion. All other systems reviewed and are negative. Vitals:    04/03/21 1237 04/03/21 1323   BP: (!) 142/65 (!) 121/56   Pulse: 66 69   Resp: 16 18   Temp: 97.9 °F (36.6 °C)    SpO2: 98% 95%   Weight: 86.2 kg (190 lb)    Height: 5' 5\" (1.651 m)             Physical Exam  Vitals signs and nursing note reviewed. Constitutional:       Appearance: Normal appearance. She is not ill-appearing or toxic-appearing. HENT:      Head: Normocephalic and atraumatic. Nose: Nose normal.      Mouth/Throat:      Mouth: Mucous membranes are moist.   Eyes:      Extraocular Movements: Extraocular movements intact. Neck:      Musculoskeletal: Normal range of motion. No neck rigidity. Cardiovascular:      Rate and Rhythm: Normal rate and regular rhythm. Pulses: Normal pulses. Heart sounds: Normal heart sounds. Pulmonary:      Effort: Pulmonary effort is normal. No respiratory distress. Breath sounds: Normal breath sounds. Abdominal:      General: Abdomen is flat. There is no distension. Palpations: Abdomen is soft. Tenderness: There is no abdominal tenderness. Musculoskeletal:         General: Tenderness present. Comments: Left lower extremity: Left hip with lateral well-healing surgical incision site. No evidence of infection. Pain with decreased range of motion of the left hip with shortening of the left lower extremity. Neurovascular intact distally. Skin:     General: Skin is warm and dry.    Neurological:      General: No focal deficit present. Mental Status: She is alert and oriented to person, place, and time. Psychiatric:         Mood and Affect: Mood normal.          MDM  Number of Diagnoses or Management Options  Diagnosis management comments: 28-year-old female with left hip dislocation. X-ray obtained for confirmation. Orthopedics advise for emergency department to sedate and reduce. Please see procedure note for details. Reduction successful after first attempt. Patient tolerated well. Knee immobilizer placed. Orthopedics came to evaluate patient, will follow up with her with her previously scheduled appointment on Monday. She can weight-bear as tolerated. Patient will return to the ER for worsening or worrisome symptoms. She voiced understanding and agreement with this plan. Amount and/or Complexity of Data Reviewed  Tests in the radiology section of CPT®: ordered and reviewed  Discussion of test results with the performing providers: yes  Decide to obtain previous medical records or to obtain history from someone other than the patient: yes  Review and summarize past medical records: yes  Discuss the patient with other providers: yes    Risk of Complications, Morbidity, and/or Mortality  Presenting problems: moderate  Diagnostic procedures: moderate  Management options: moderate           Procedural Sedation    Date/Time: 4/3/2021 1:31 PM  Performed by: Carly Andres DO  Authorized by: Carly Andres DO     Consent:     Consent obtained:  Written    Consent given by:  Patient    Risks discussed:   Allergic reaction, prolonged hypoxia resulting in organ damage and respiratory compromise necessitating ventilatory assistance and intubation  Universal protocol:     Procedure explained and questions answered to patient or proxy's satisfaction: yes      Relevant documents present and verified: yes      Imaging studies available: yes      Immediately prior to procedure a time out was called: yes      Patient identity confirmation method:  Verbally with patient  Indications:     Procedure performed:  Dislocation reduction    Procedure necessitating sedation performed by:  Physician performing sedation    Intended level of sedation:  Moderate (conscious sedation)  Pre-sedation assessment:     NPO status caution: urgency dictates proceeding with non-ideal NPO status      ASA classification: class 2 - patient with mild systemic disease      Neck mobility: normal      Mallampati score:  II - soft palate, uvula, fauces visible    Pre-sedation assessments completed and reviewed: airway patency, anesthesia/sedation history, mental status, nausea/vomiting, pain level, respiratory function and temperature    Immediate pre-procedure details:     Reassessment: Patient reassessed immediately prior to procedure      Reviewed: vital signs, relevant labs/tests and NPO status      Verified: bag valve mask available, emergency equipment available, intubation equipment available, IV patency confirmed, oxygen available and suction available    Procedure details (see MAR for exact dosages):     Preoxygenation:  Nasal cannula    Sedation:  Propofol    Intra-procedure monitoring:  Blood pressure monitoring, continuous capnometry, frequent LOC assessments, frequent vital sign checks, continuous pulse oximetry and cardiac monitor    Intra-procedure events: none      Total sedation time (minutes):  10  Post-procedure details:     Attendance: Constant attendance by certified staff until patient recovered      Recovery: Patient returned to pre-procedure baseline      Estimated blood loss (see I/O flowsheets): no      Complications:  None    Post-sedation assessments completed and reviewed: airway patency, mental status, nausea/vomiting, pain level and respiratory function      Specimens recovered:  None    Patient is stable for discharge or admission: yes      Patient tolerance:   Tolerated well, no immediate complications  Reduction of Joint    Date/Time: 4/3/2021 1:33 PM  Performed by: Dayna Zheng DO  Authorized by: Makenna Charlton DO     Consent:     Consent obtained:  Written    Consent given by:  Patient    Risks discussed:  Nerve damage, pain and vascular damage  Injury:     Injury location:  Hip    Hip injury location:  L hip  Pre-procedure assessment:     Neurological function: normal      Distal perfusion: normal      Range of motion: reduced    Sedation:     Sedation type: Moderate (conscious) sedation  Procedure details:     Manipulation performed: yes      Reduction successful: yes      X-ray confirmed reduction: yes      Supplies used:  Knee immobilizer  Post-procedure assessment:     Neurological function: normal      Distal perfusion: normal      Range of motion: improved      Patient tolerance of procedure: Tolerated well, no immediate complications  Splint, Other    Date/Time: 4/3/2021 1:34 PM  Performed by: Dayna Zheng DO  Authorized by: Makenna Charlton DO     Consent:     Consent obtained:  Verbal    Consent given by:  Patient    Risks discussed:  Numbness, pain and swelling    Alternatives discussed:  No treatment  Pre-procedure details:     Sensation:  Normal  Procedure details:     Laterality:  Left    Location:  Leg    Leg:  L upper leg    Splint type:  Knee immobilizer    Supplies:  Prefabricated splint  Post-procedure details:     Pain:  Improved    Sensation:  Normal    Patient tolerance of procedure:   Tolerated well, no immediate complications

## 2021-04-03 NOTE — ED NOTES
I have reviewed discharge instructions with the patient. The patient verbalized understanding. Patient left ED via Discharge Method: wheelchair to Home with son. Opportunity for questions and clarification provided. Patient given 0 scripts. To continue your aftercare when you leave the hospital, you may receive an automated call from our care team to check in on how you are doing. This is a free service and part of our promise to provide the best care and service to meet your aftercare needs.  If you have questions, or wish to unsubscribe from this service please call [phone]. Thank you for Choosing our [facility] Emergency Department.

## 2021-04-03 NOTE — ED TRIAGE NOTES
Pt here via EMS from [facility] ED. Had hip replaced here on 3/11 and was at home shaving and bent over and hip popped out of joint. Surgeon is aware and accepting pt here. Masked. VS stable. 100mcg fentanyl and 4mg zofran at ED. 20g IV RAC placed in ED at [place].

## 2021-04-03 NOTE — DISCHARGE INSTRUCTIONS
Leave knee immobilizer in place. Ambulate as tolerated. Follow-up with orthopedics on Monday at your previously scheduled appointment. Return to the ER for worsening or worrisome symptoms.

## 2021-04-04 PROCEDURE — 3331090002 HH PPS REVENUE DEBIT

## 2021-04-04 PROCEDURE — 3331090001 HH PPS REVENUE CREDIT

## 2021-04-05 PROCEDURE — 3331090001 HH PPS REVENUE CREDIT

## 2021-04-05 PROCEDURE — 3331090002 HH PPS REVENUE DEBIT

## 2021-04-06 PROCEDURE — 3331090002 HH PPS REVENUE DEBIT

## 2021-04-06 PROCEDURE — 3331090001 HH PPS REVENUE CREDIT

## 2021-04-07 PROCEDURE — 3331090001 HH PPS REVENUE CREDIT

## 2021-04-07 PROCEDURE — 3331090002 HH PPS REVENUE DEBIT

## 2021-04-08 ENCOUNTER — HOME CARE VISIT (OUTPATIENT)
Dept: SCHEDULING | Facility: HOME HEALTH | Age: 76
End: 2021-04-08
Payer: MEDICARE

## 2021-04-08 VITALS
RESPIRATION RATE: 14 BRPM | HEART RATE: 78 BPM | SYSTOLIC BLOOD PRESSURE: 120 MMHG | TEMPERATURE: 97.6 F | DIASTOLIC BLOOD PRESSURE: 62 MMHG

## 2021-04-08 PROCEDURE — 3331090001 HH PPS REVENUE CREDIT

## 2021-04-08 PROCEDURE — 3331090002 HH PPS REVENUE DEBIT

## 2021-04-08 PROCEDURE — G0151 HHCP-SERV OF PT,EA 15 MIN: HCPCS

## 2021-04-09 PROCEDURE — 3331090002 HH PPS REVENUE DEBIT

## 2021-04-09 PROCEDURE — 3331090001 HH PPS REVENUE CREDIT

## 2021-04-10 PROCEDURE — 3331090002 HH PPS REVENUE DEBIT

## 2021-04-10 PROCEDURE — 3331090001 HH PPS REVENUE CREDIT

## 2021-04-11 PROCEDURE — 3331090001 HH PPS REVENUE CREDIT

## 2021-04-11 PROCEDURE — 3331090002 HH PPS REVENUE DEBIT

## 2021-04-12 PROCEDURE — 3331090001 HH PPS REVENUE CREDIT

## 2021-04-12 PROCEDURE — 3331090002 HH PPS REVENUE DEBIT

## 2021-04-13 ENCOUNTER — HOME CARE VISIT (OUTPATIENT)
Dept: SCHEDULING | Facility: HOME HEALTH | Age: 76
End: 2021-04-13
Payer: MEDICARE

## 2021-04-13 VITALS
HEART RATE: 70 BPM | DIASTOLIC BLOOD PRESSURE: 70 MMHG | SYSTOLIC BLOOD PRESSURE: 122 MMHG | RESPIRATION RATE: 14 BRPM | TEMPERATURE: 97.8 F

## 2021-04-13 PROCEDURE — 3331090002 HH PPS REVENUE DEBIT

## 2021-04-13 PROCEDURE — 400018 HH-NO PAY CLAIM PROCEDURE

## 2021-04-13 PROCEDURE — 3331090001 HH PPS REVENUE CREDIT

## 2021-04-13 PROCEDURE — 400013 HH SOC

## 2021-04-13 PROCEDURE — G0151 HHCP-SERV OF PT,EA 15 MIN: HCPCS

## 2021-04-14 PROCEDURE — 3331090002 HH PPS REVENUE DEBIT

## 2021-04-14 PROCEDURE — 3331090001 HH PPS REVENUE CREDIT

## 2021-04-15 PROCEDURE — 3331090001 HH PPS REVENUE CREDIT

## 2021-04-15 PROCEDURE — 3331090002 HH PPS REVENUE DEBIT

## 2021-04-16 ENCOUNTER — HOME CARE VISIT (OUTPATIENT)
Dept: HOME HEALTH SERVICES | Facility: HOME HEALTH | Age: 76
End: 2021-04-16
Payer: MEDICARE

## 2021-04-16 ENCOUNTER — HOME CARE VISIT (OUTPATIENT)
Dept: SCHEDULING | Facility: HOME HEALTH | Age: 76
End: 2021-04-16
Payer: MEDICARE

## 2021-04-16 VITALS
HEART RATE: 60 BPM | RESPIRATION RATE: 18 BRPM | SYSTOLIC BLOOD PRESSURE: 110 MMHG | TEMPERATURE: 97.6 F | DIASTOLIC BLOOD PRESSURE: 66 MMHG

## 2021-04-16 PROCEDURE — 3331090002 HH PPS REVENUE DEBIT

## 2021-04-16 PROCEDURE — 3331090001 HH PPS REVENUE CREDIT

## 2021-04-16 PROCEDURE — G0157 HHC PT ASSISTANT EA 15: HCPCS

## 2021-04-17 PROCEDURE — 3331090001 HH PPS REVENUE CREDIT

## 2021-04-17 PROCEDURE — 3331090002 HH PPS REVENUE DEBIT

## 2021-04-18 PROCEDURE — 3331090002 HH PPS REVENUE DEBIT

## 2021-04-18 PROCEDURE — 3331090001 HH PPS REVENUE CREDIT

## 2021-04-19 PROCEDURE — 3331090002 HH PPS REVENUE DEBIT

## 2021-04-19 PROCEDURE — 3331090001 HH PPS REVENUE CREDIT

## 2021-04-20 ENCOUNTER — HOME CARE VISIT (OUTPATIENT)
Dept: SCHEDULING | Facility: HOME HEALTH | Age: 76
End: 2021-04-20
Payer: MEDICARE

## 2021-04-20 VITALS
DIASTOLIC BLOOD PRESSURE: 64 MMHG | HEART RATE: 62 BPM | TEMPERATURE: 97.5 F | RESPIRATION RATE: 16 BRPM | SYSTOLIC BLOOD PRESSURE: 110 MMHG

## 2021-04-20 PROCEDURE — G0151 HHCP-SERV OF PT,EA 15 MIN: HCPCS

## 2021-04-20 PROCEDURE — 3331090001 HH PPS REVENUE CREDIT

## 2021-04-20 PROCEDURE — 3331090002 HH PPS REVENUE DEBIT

## 2021-10-29 ENCOUNTER — HOSPITAL ENCOUNTER (INPATIENT)
Age: 76
LOS: 4 days | Discharge: NURSING, REHAB OR LONG-TERM CARE | DRG: 291 | End: 2021-11-02
Attending: EMERGENCY MEDICINE | Admitting: FAMILY MEDICINE
Payer: MEDICARE

## 2021-10-29 ENCOUNTER — APPOINTMENT (OUTPATIENT)
Dept: GENERAL RADIOLOGY | Age: 76
DRG: 291 | End: 2021-10-29
Attending: EMERGENCY MEDICINE
Payer: MEDICARE

## 2021-10-29 DIAGNOSIS — F41.1 GENERALIZED ANXIETY DISORDER: ICD-10-CM

## 2021-10-29 DIAGNOSIS — J96.01 ACUTE RESPIRATORY FAILURE WITH HYPOXIA (HCC): ICD-10-CM

## 2021-10-29 DIAGNOSIS — J81.0 ACUTE PULMONARY EDEMA (HCC): ICD-10-CM

## 2021-10-29 DIAGNOSIS — R06.02 SOB (SHORTNESS OF BREATH): Primary | ICD-10-CM

## 2021-10-29 DIAGNOSIS — Z98.890 HISTORY OF SHOULDER SURGERY: ICD-10-CM

## 2021-10-29 PROBLEM — G30.1 LATE ONSET ALZHEIMER DEMENTIA (HCC): Chronic | Status: ACTIVE | Noted: 2021-10-29

## 2021-10-29 PROBLEM — F43.22 ADJUSTMENT DISORDER WITH ANXIOUS MOOD: Status: RESOLVED | Noted: 2017-10-03 | Resolved: 2021-10-29

## 2021-10-29 PROBLEM — F02.80 LATE ONSET ALZHEIMER DEMENTIA (HCC): Chronic | Status: ACTIVE | Noted: 2021-10-29

## 2021-10-29 PROBLEM — I50.32 CHRONIC DIASTOLIC CHF (CONGESTIVE HEART FAILURE) (HCC): Chronic | Status: ACTIVE | Noted: 2021-10-25

## 2021-10-29 PROBLEM — I27.20 PULMONARY HYPERTENSION (HCC): Chronic | Status: ACTIVE | Noted: 2021-10-28

## 2021-10-29 PROBLEM — F43.21 UNRESOLVED GRIEF: Status: RESOLVED | Noted: 2017-06-22 | Resolved: 2021-10-29

## 2021-10-29 PROBLEM — I27.20 PULMONARY HYPERTENSION (HCC): Status: ACTIVE | Noted: 2021-10-28

## 2021-10-29 PROBLEM — F33.41 RECURRENT MAJOR DEPRESSIVE DISORDER, IN PARTIAL REMISSION (HCC): Chronic | Status: ACTIVE | Noted: 2018-02-25

## 2021-10-29 PROBLEM — I50.32 CHRONIC DIASTOLIC CHF (CONGESTIVE HEART FAILURE) (HCC): Status: ACTIVE | Noted: 2021-10-25

## 2021-10-29 PROBLEM — Z79.899 CHRONIC PRESCRIPTION BENZODIAZEPINE USE: Status: ACTIVE | Noted: 2021-10-28

## 2021-10-29 PROBLEM — M16.12 OSTEOARTHRITIS OF LEFT HIP: Status: RESOLVED | Noted: 2021-03-11 | Resolved: 2021-10-29

## 2021-10-29 PROBLEM — E66.3 OVERWEIGHT (BMI 25.0-29.9): Status: RESOLVED | Noted: 2018-02-25 | Resolved: 2021-10-29

## 2021-10-29 LAB
ANION GAP SERPL CALC-SCNC: 4 MMOL/L (ref 7–16)
ANION GAP SERPL CALC-SCNC: 7 MMOL/L (ref 7–16)
ARTERIAL PATENCY WRIST A: POSITIVE
BASE EXCESS BLDV CALC-SCNC: 3 MMOL/L
BASOPHILS # BLD: 0 K/UL (ref 0–0.2)
BASOPHILS NFR BLD: 0 % (ref 0–2)
BDY SITE: ABNORMAL
BNP SERPL-MCNC: 1451 PG/ML
BUN SERPL-MCNC: 11 MG/DL (ref 8–23)
BUN SERPL-MCNC: 11 MG/DL (ref 8–23)
CALCIUM SERPL-MCNC: 8.8 MG/DL (ref 8.3–10.4)
CALCIUM SERPL-MCNC: 9.1 MG/DL (ref 8.3–10.4)
CHLORIDE SERPL-SCNC: 107 MMOL/L (ref 98–107)
CHLORIDE SERPL-SCNC: 109 MMOL/L (ref 98–107)
CO2 SERPL-SCNC: 28 MMOL/L (ref 21–32)
CO2 SERPL-SCNC: 31 MMOL/L (ref 21–32)
CREAT SERPL-MCNC: 0.86 MG/DL (ref 0.6–1)
CREAT SERPL-MCNC: 0.87 MG/DL (ref 0.6–1)
D DIMER PPP FEU-MCNC: 0.86 UG/ML(FEU)
DIFFERENTIAL METHOD BLD: ABNORMAL
EOSINOPHIL # BLD: 0 K/UL (ref 0–0.8)
EOSINOPHIL NFR BLD: 0 % (ref 0.5–7.8)
ERYTHROCYTE [DISTWIDTH] IN BLOOD BY AUTOMATED COUNT: 13.2 % (ref 11.9–14.6)
GAS FLOW.O2 O2 DELIVERY SYS: ABNORMAL L/MIN
GLUCOSE SERPL-MCNC: 143 MG/DL (ref 65–100)
GLUCOSE SERPL-MCNC: 160 MG/DL (ref 65–100)
HCO3 BLDV-SCNC: 28.5 MMOL/L (ref 23–28)
HCT VFR BLD AUTO: 32.1 % (ref 35.8–46.3)
HGB BLD-MCNC: 10.1 G/DL (ref 11.7–15.4)
IMM GRANULOCYTES # BLD AUTO: 0.1 K/UL (ref 0–0.5)
IMM GRANULOCYTES NFR BLD AUTO: 0 % (ref 0–5)
INR PPP: 1
LACTATE SERPL-SCNC: 1.9 MMOL/L (ref 0.4–2)
LACTATE SERPL-SCNC: 2.5 MMOL/L (ref 0.4–2)
LYMPHOCYTES # BLD: 0.7 K/UL (ref 0.5–4.6)
LYMPHOCYTES NFR BLD: 5 % (ref 13–44)
MAGNESIUM SERPL-MCNC: 1.7 MG/DL (ref 1.8–2.4)
MCH RBC QN AUTO: 31.3 PG (ref 26.1–32.9)
MCHC RBC AUTO-ENTMCNC: 31.5 G/DL (ref 31.4–35)
MCV RBC AUTO: 99.4 FL (ref 79.6–97.8)
MONOCYTES # BLD: 0.7 K/UL (ref 0.1–1.3)
MONOCYTES NFR BLD: 5 % (ref 4–12)
NEUTS SEG # BLD: 10.9 K/UL (ref 1.7–8.2)
NEUTS SEG NFR BLD: 89 % (ref 43–78)
NRBC # BLD: 0 K/UL (ref 0–0.2)
O2/TOTAL GAS SETTING VFR VENT: 21 %
PCO2 BLDV: 47.2 MMHG (ref 41–51)
PH BLDV: 7.39 [PH] (ref 7.32–7.42)
PLATELET # BLD AUTO: 211 K/UL (ref 150–450)
PMV BLD AUTO: 11.4 FL (ref 9.4–12.3)
PO2 BLDV: 40 MMHG
POTASSIUM SERPL-SCNC: 3.7 MMOL/L (ref 3.5–5.1)
POTASSIUM SERPL-SCNC: 4.3 MMOL/L (ref 3.5–5.1)
PROTHROMBIN TIME: 13.8 SEC (ref 12.6–14.5)
RBC # BLD AUTO: 3.23 M/UL (ref 4.05–5.2)
SAO2 % BLDV: 73.7 % (ref 65–88)
SERVICE CMNT-IMP: ABNORMAL
SODIUM SERPL-SCNC: 142 MMOL/L (ref 136–145)
SODIUM SERPL-SCNC: 144 MMOL/L (ref 136–145)
SPECIMEN TYPE: ABNORMAL
TROPONIN-HIGH SENSITIVITY: 18.5 PG/ML (ref 0–14)
TROPONIN-HIGH SENSITIVITY: 20.2 PG/ML (ref 0–14)
WBC # BLD AUTO: 12.3 K/UL (ref 4.3–11.1)

## 2021-10-29 PROCEDURE — 83735 ASSAY OF MAGNESIUM: CPT

## 2021-10-29 PROCEDURE — 74011250636 HC RX REV CODE- 250/636: Performed by: EMERGENCY MEDICINE

## 2021-10-29 PROCEDURE — 80048 BASIC METABOLIC PNL TOTAL CA: CPT

## 2021-10-29 PROCEDURE — 2709999900 HC NON-CHARGEABLE SUPPLY

## 2021-10-29 PROCEDURE — 65610000001 HC ROOM ICU GENERAL

## 2021-10-29 PROCEDURE — 93005 ELECTROCARDIOGRAM TRACING: CPT | Performed by: EMERGENCY MEDICINE

## 2021-10-29 PROCEDURE — 85379 FIBRIN DEGRADATION QUANT: CPT

## 2021-10-29 PROCEDURE — 74011250637 HC RX REV CODE- 250/637: Performed by: EMERGENCY MEDICINE

## 2021-10-29 PROCEDURE — 83880 ASSAY OF NATRIURETIC PEPTIDE: CPT

## 2021-10-29 PROCEDURE — 83605 ASSAY OF LACTIC ACID: CPT

## 2021-10-29 PROCEDURE — 96374 THER/PROPH/DIAG INJ IV PUSH: CPT

## 2021-10-29 PROCEDURE — 74011000250 HC RX REV CODE- 250: Performed by: EMERGENCY MEDICINE

## 2021-10-29 PROCEDURE — 85610 PROTHROMBIN TIME: CPT

## 2021-10-29 PROCEDURE — 74011250637 HC RX REV CODE- 250/637: Performed by: FAMILY MEDICINE

## 2021-10-29 PROCEDURE — 71045 X-RAY EXAM CHEST 1 VIEW: CPT

## 2021-10-29 PROCEDURE — 84484 ASSAY OF TROPONIN QUANT: CPT

## 2021-10-29 PROCEDURE — 82803 BLOOD GASES ANY COMBINATION: CPT

## 2021-10-29 PROCEDURE — 94640 AIRWAY INHALATION TREATMENT: CPT

## 2021-10-29 PROCEDURE — 99285 EMERGENCY DEPT VISIT HI MDM: CPT

## 2021-10-29 PROCEDURE — 85025 COMPLETE CBC W/AUTO DIFF WBC: CPT

## 2021-10-29 RX ORDER — MIRTAZAPINE 15 MG/1
45 TABLET, FILM COATED ORAL
Status: DISCONTINUED | OUTPATIENT
Start: 2021-10-29 | End: 2021-11-02 | Stop reason: HOSPADM

## 2021-10-29 RX ORDER — ONDANSETRON 2 MG/ML
4 INJECTION INTRAMUSCULAR; INTRAVENOUS
Status: DISCONTINUED | OUTPATIENT
Start: 2021-10-29 | End: 2021-11-02 | Stop reason: HOSPADM

## 2021-10-29 RX ORDER — LEVOTHYROXINE SODIUM 75 UG/1
75 TABLET ORAL
Status: DISCONTINUED | OUTPATIENT
Start: 2021-10-30 | End: 2021-11-02 | Stop reason: HOSPADM

## 2021-10-29 RX ORDER — CHOLECALCIFEROL (VITAMIN D3) 125 MCG
10 CAPSULE ORAL
Status: DISCONTINUED | OUTPATIENT
Start: 2021-10-29 | End: 2021-11-02 | Stop reason: HOSPADM

## 2021-10-29 RX ORDER — DEXTROSE 40 %
15 GEL (GRAM) ORAL AS NEEDED
Status: DISCONTINUED | OUTPATIENT
Start: 2021-10-29 | End: 2021-11-02 | Stop reason: HOSPADM

## 2021-10-29 RX ORDER — CELECOXIB 100 MG/1
100 CAPSULE ORAL 2 TIMES DAILY
Status: DISCONTINUED | OUTPATIENT
Start: 2021-10-30 | End: 2021-11-02 | Stop reason: HOSPADM

## 2021-10-29 RX ORDER — FLUOXETINE HYDROCHLORIDE 20 MG/1
20 CAPSULE ORAL
Status: DISCONTINUED | OUTPATIENT
Start: 2021-10-30 | End: 2021-11-02 | Stop reason: HOSPADM

## 2021-10-29 RX ORDER — ACETAMINOPHEN 325 MG/1
650 TABLET ORAL
Status: DISCONTINUED | OUTPATIENT
Start: 2021-10-29 | End: 2021-11-02 | Stop reason: HOSPADM

## 2021-10-29 RX ORDER — IPRATROPIUM BROMIDE AND ALBUTEROL SULFATE 2.5; .5 MG/3ML; MG/3ML
3 SOLUTION RESPIRATORY (INHALATION)
Status: COMPLETED | OUTPATIENT
Start: 2021-10-29 | End: 2021-10-29

## 2021-10-29 RX ORDER — METOPROLOL TARTRATE 25 MG/1
25 TABLET, FILM COATED ORAL EVERY 12 HOURS
Status: DISCONTINUED | OUTPATIENT
Start: 2021-10-29 | End: 2021-11-02 | Stop reason: HOSPADM

## 2021-10-29 RX ORDER — ALPRAZOLAM 0.5 MG/1
0.25 TABLET ORAL
Status: DISCONTINUED | OUTPATIENT
Start: 2021-10-29 | End: 2021-11-02 | Stop reason: HOSPADM

## 2021-10-29 RX ORDER — SODIUM CHLORIDE 0.9 % (FLUSH) 0.9 %
5-40 SYRINGE (ML) INJECTION EVERY 8 HOURS
Status: DISCONTINUED | OUTPATIENT
Start: 2021-10-29 | End: 2021-11-02 | Stop reason: HOSPADM

## 2021-10-29 RX ORDER — GABAPENTIN 400 MG/1
400 CAPSULE ORAL 4 TIMES DAILY
Status: DISCONTINUED | OUTPATIENT
Start: 2021-10-29 | End: 2021-11-02 | Stop reason: HOSPADM

## 2021-10-29 RX ORDER — ENOXAPARIN SODIUM 100 MG/ML
40 INJECTION SUBCUTANEOUS DAILY
Status: DISCONTINUED | OUTPATIENT
Start: 2021-10-30 | End: 2021-11-02 | Stop reason: HOSPADM

## 2021-10-29 RX ORDER — MORPHINE SULFATE 4 MG/ML
4 INJECTION INTRAVENOUS
Status: DISCONTINUED | OUTPATIENT
Start: 2021-10-29 | End: 2021-11-02 | Stop reason: HOSPADM

## 2021-10-29 RX ORDER — HYDROCODONE BITARTRATE AND ACETAMINOPHEN 10; 325 MG/1; MG/1
1 TABLET ORAL
Status: DISCONTINUED | OUTPATIENT
Start: 2021-10-29 | End: 2021-11-02 | Stop reason: HOSPADM

## 2021-10-29 RX ORDER — FENOFIBRATE 160 MG/1
160 TABLET ORAL DAILY
Status: DISCONTINUED | OUTPATIENT
Start: 2021-10-30 | End: 2021-11-02 | Stop reason: HOSPADM

## 2021-10-29 RX ORDER — BUSPIRONE HYDROCHLORIDE 5 MG/1
20 TABLET ORAL 3 TIMES DAILY
Status: DISCONTINUED | OUTPATIENT
Start: 2021-10-29 | End: 2021-11-02 | Stop reason: HOSPADM

## 2021-10-29 RX ORDER — FUROSEMIDE 10 MG/ML
40 INJECTION INTRAMUSCULAR; INTRAVENOUS ONCE
Status: COMPLETED | OUTPATIENT
Start: 2021-10-30 | End: 2021-10-30

## 2021-10-29 RX ORDER — PRAVASTATIN SODIUM 20 MG/1
80 TABLET ORAL
Status: DISCONTINUED | OUTPATIENT
Start: 2021-10-29 | End: 2021-11-02 | Stop reason: HOSPADM

## 2021-10-29 RX ORDER — DONEPEZIL HYDROCHLORIDE 5 MG/1
10 TABLET, FILM COATED ORAL
Status: DISCONTINUED | OUTPATIENT
Start: 2021-10-29 | End: 2021-11-02 | Stop reason: HOSPADM

## 2021-10-29 RX ORDER — SODIUM CHLORIDE 0.9 % (FLUSH) 0.9 %
5-40 SYRINGE (ML) INJECTION AS NEEDED
Status: DISCONTINUED | OUTPATIENT
Start: 2021-10-29 | End: 2021-11-02 | Stop reason: HOSPADM

## 2021-10-29 RX ORDER — POLYETHYLENE GLYCOL 3350 17 G/17G
17 POWDER, FOR SOLUTION ORAL DAILY
Status: DISCONTINUED | OUTPATIENT
Start: 2021-10-30 | End: 2021-11-02 | Stop reason: HOSPADM

## 2021-10-29 RX ORDER — HYDROCODONE BITARTRATE AND ACETAMINOPHEN 7.5; 325 MG/1; MG/1
1 TABLET ORAL
Status: DISCONTINUED | OUTPATIENT
Start: 2021-10-29 | End: 2021-10-29

## 2021-10-29 RX ORDER — DEXTROSE 50 % IN WATER (D50W) INTRAVENOUS SYRINGE
25-50 AS NEEDED
Status: DISCONTINUED | OUTPATIENT
Start: 2021-10-29 | End: 2021-11-02 | Stop reason: HOSPADM

## 2021-10-29 RX ORDER — LOSARTAN POTASSIUM 25 MG/1
25 TABLET ORAL DAILY
Status: DISCONTINUED | OUTPATIENT
Start: 2021-10-30 | End: 2021-11-02 | Stop reason: HOSPADM

## 2021-10-29 RX ORDER — ACETAMINOPHEN 650 MG/1
650 SUPPOSITORY RECTAL
Status: DISCONTINUED | OUTPATIENT
Start: 2021-10-29 | End: 2021-11-02 | Stop reason: HOSPADM

## 2021-10-29 RX ORDER — FUROSEMIDE 10 MG/ML
80 INJECTION INTRAMUSCULAR; INTRAVENOUS
Status: COMPLETED | OUTPATIENT
Start: 2021-10-29 | End: 2021-10-29

## 2021-10-29 RX ORDER — PROMETHAZINE HYDROCHLORIDE 25 MG/1
12.5 TABLET ORAL
Status: DISCONTINUED | OUTPATIENT
Start: 2021-10-29 | End: 2021-11-02 | Stop reason: HOSPADM

## 2021-10-29 RX ADMIN — METOPROLOL TARTRATE 25 MG: 25 TABLET, FILM COATED ORAL at 22:54

## 2021-10-29 RX ADMIN — HYDROCODONE BITARTRATE AND ACETAMINOPHEN 1 TABLET: 10; 325 TABLET ORAL at 22:36

## 2021-10-29 RX ADMIN — Medication 10 MG: at 22:37

## 2021-10-29 RX ADMIN — DONEPEZIL HYDROCHLORIDE 10 MG: 5 TABLET, FILM COATED ORAL at 22:37

## 2021-10-29 RX ADMIN — NITROGLYCERIN 1 INCH: 20 OINTMENT TOPICAL at 20:00

## 2021-10-29 RX ADMIN — FUROSEMIDE 80 MG: 10 INJECTION, SOLUTION INTRAMUSCULAR; INTRAVENOUS at 20:00

## 2021-10-29 RX ADMIN — Medication 10 ML: at 23:33

## 2021-10-29 RX ADMIN — BUSPIRONE HYDROCHLORIDE 20 MG: 5 TABLET ORAL at 22:37

## 2021-10-29 RX ADMIN — IPRATROPIUM BROMIDE AND ALBUTEROL SULFATE 3 ML: .5; 3 SOLUTION RESPIRATORY (INHALATION) at 19:51

## 2021-10-29 RX ADMIN — MIRTAZAPINE 45 MG: 15 TABLET, FILM COATED ORAL at 22:37

## 2021-10-29 RX ADMIN — GABAPENTIN 400 MG: 400 CAPSULE ORAL at 22:37

## 2021-10-29 NOTE — ED PROVIDER NOTES
48-year-old female with history of A. fib and chronic anxiety presents to the ER with anxiety and dyspnea. Today is postop day 1 from a right reverse total shoulder arthroplasty done at [facility]. Patient has no history of asthma, COPD, CHF. Oxygen sats were as low as 65% on arrival with reportedly good waveform according to the triage nurse. She was placed on oxygen 4 L and they eventually came up to 84% in the 94%. Patient does feel anxious, and she does feel short of breath. She was admitted yesterday and underwent surgery yesterday. Discharged home today around lunchtime. Patient states she started feeling short of breath last night while in the hospital she told staff who administered some oxygen but patient does not recall any mention of any pulse ox saturation values. She was not administered any breathing treatments or inhalers. She has a history of irritable bowel syndrome and pretty much as soon as she got home from the hospital she had to make it straight to the bathroom but did not quite get there in time. This triggered an anxiety attack and she took one of her Ativan tablets earlier this afternoon. Patient has some sharp left-sided chest pain which started sometime last night  Cardiac history limited to atrial fibrillation, no prior CHF, angina, stents. Patient does use prn 6 when her legs get some swelling in them. Hospitalist note from her admission last night does reference some mild pulmonary hypertension that appears that they added an ARB to her beta-blocker    Postoperative right shoulder pain currently being managed by pain pump presumably some form of lidocaine pain is reasonably well controlled she rates it as a 7 out of 10 \"not that bad\"    The history is provided by the patient, a relative and medical records. Anxiety   This is a chronic problem. The problem has been gradually worsening. Associated symptoms include shortness of breath.  Pertinent negatives include no abdominal pain, no back pain, no cough, no dizziness, no fever, no headaches, no nausea, no palpitations and no vomiting. Shortness of Breath  Associated symptoms include chest pain. Pertinent negatives include no fever, no headaches, no rhinorrhea, no sore throat, no cough, no wheezing, no vomiting, no abdominal pain, no rash and no leg swelling. Past Medical History:   Diagnosis Date    Arthritis     osteo-- all over    Atrial fibrillation ([facility] [address] 2016    per cardio note dated 8/16/16= had a single episode of paroxysmal atrial fibrillation in hospital under stress. No recurrence. CHADS2-vasc of 1. NO change in Rx. Continue BBlocker. No need for anticoag.  BMI 31.0-31.9,adult     Cataract of both eyes     CHF (congestive heart failure) ([facility]     Per PCP office note dated 12/08/2020;  lasix as needed    Chronic pain     back pain    Dementia ([facility]     per PCP office note dated 12/08/2020, aricept daily. Pt states she tends to forget names and has trouble remenbering names of certain items.     Generalized anxiety disorder 11/17/2016    GERD (gastroesophageal reflux disease)     hiatal hernia, managed with otc meds prn     High blood pressure     on med for control     High cholesterol     on med for control    Insomnia secondary to anxiety 11/17/2016    Irregular heart beat     Left knee pain     Major depressive disorder, recurrent ([facility] 11/17/2016    managed with meds     Neuropathy     Bilateral feet     Poor historian     Prediabetes     no meds or bs checks at home; A1C=5.7 on 02/16/21    PUD (peptic ulcer disease)     hiatal hernia; denies ulcers     Thyroid disease     hypo; on med for control     Vitamin B 12 deficiency        Past Surgical History:   Procedure Laterality Date    HX BACK SURGERY      spinal cord stimulator placed and then removed     HX HIP REPLACEMENT      HX HYSTERECTOMY      HX KNEE REPLACEMENT Right     HX KNEE REPLACEMENT Left     HX LAP CHOLECYSTECTOMY      PELVIS/HIP JOINT SURGERY UNLISTED Left 03/11/2021    hip replacement         Family History:   Problem Relation Age of Onset    Cancer Mother     Heart Attack Father     Hypertension Father     Kidney Disease Father     Tuberculosis Father        Social History     Socioeconomic History    Marital status:      Spouse name: Not on file    Number of children: Not on file    Years of education: Not on file    Highest education level: Not on file   Occupational History    Not on file   Tobacco Use    Smoking status: Never Smoker    Smokeless tobacco: Never Used   Vaping Use    Vaping Use: Never used   Substance and Sexual Activity    Alcohol use: No    Drug use: No    Sexual activity: Not on file   Other Topics Concern    Not on file   Social History Narrative    Not on file     Social Determinants of Health     Financial Resource Strain:     Difficulty of Paying Living Expenses:    Food Insecurity:     Worried About Running Out of Food in the Last Year:     [address] in the Last Year:    Transportation Needs:     Lack of Transportation (Medical):  Lack of Transportation (Non-Medical):    Physical Activity:     Days of Exercise per Week:     Minutes of Exercise per Session:    Stress:     Feeling of Stress :    Social Connections:     Frequency of Communication with Friends and Family:     Frequency of Social Gatherings with Friends and Family:     Attends Yazidi Services:     Active Member of Clubs or Organizations:     Attends Club or Organization Meetings:     Marital Status:    Intimate Partner Violence:     Fear of Current or Ex-Partner:     Emotionally Abused:     Physically Abused:     Sexually Abused: ALLERGIES: Morphine, Oxycodone, and Oxycodone-acetaminophen    Review of Systems   Constitutional: Negative for chills and fever. HENT: Negative for rhinorrhea and sore throat. Eyes: Negative for discharge and redness. Respiratory: Positive for shortness of breath. Negative for cough and wheezing. Cardiovascular: Positive for chest pain. Negative for palpitations and leg swelling. Gastrointestinal: Negative for abdominal pain, diarrhea, nausea and vomiting. Musculoskeletal: Positive for arthralgias. Negative for back pain. Skin: Negative for rash. Neurological: Negative for dizziness and headaches. Psychiatric/Behavioral: The patient is nervous/anxious. All other systems reviewed and are negative. Vitals:    10/29/21 1844   BP: (!) 123/94   Pulse: 84   Resp: 18   Temp: 98.5 °F (36.9 °C)   SpO2: (!) 82%   Weight: 83.9 kg (185 lb)   Height: 5' 5\" (1.651 m)            Physical Exam  Vitals and nursing note reviewed. Constitutional:       General: She is in acute distress. Appearance: Normal appearance. She is well-developed. She is ill-appearing. She is not toxic-appearing or diaphoretic. HENT:      Head: Normocephalic and atraumatic. Right Ear: External ear normal.      Left Ear: External ear normal.      Mouth/Throat:      Mouth: Mucous membranes are moist.      Pharynx: Oropharynx is clear. No oropharyngeal exudate or posterior oropharyngeal erythema. Eyes:      General: No scleral icterus. Right eye: No discharge. Left eye: No discharge. Extraocular Movements: Extraocular movements intact. Conjunctiva/sclera: Conjunctivae normal.      Pupils: Pupils are equal, round, and reactive to light. Neck:      Thyroid: No thyromegaly. Trachea: Trachea normal.   Cardiovascular:      Rate and Rhythm: Normal rate and regular rhythm. Heart sounds: Normal heart sounds. No murmur heard. No gallop. Pulmonary:      Effort: Tachypnea and accessory muscle usage present. No respiratory distress. Breath sounds: Examination of the right-middle field reveals rales. Examination of the left-middle field reveals rales.  Examination of the right-lower field reveals decreased breath sounds and rales. Examination of the left-lower field reveals rales. Decreased breath sounds and rales present. No wheezing. Abdominal:      General: Bowel sounds are normal.      Palpations: Abdomen is soft. There is no hepatomegaly, splenomegaly or pulsatile mass. Tenderness: There is no abdominal tenderness. There is no guarding. Musculoskeletal:         General: Normal range of motion. Cervical back: Normal range of motion and neck supple. Normal range of motion. Lymphadenopathy:      Cervical: No cervical adenopathy. Skin:     General: Skin is warm and dry. Neurological:      General: No focal deficit present. Mental Status: She is alert and oriented to person, place, and time. Mental status is at baseline. Motor: No abnormal muscle tone. Comments: cni 2-12 grossly   Psychiatric:         Mood and Affect: Mood normal.         Behavior: Behavior normal.          MDM  Number of Diagnoses or Management Options  Acute pulmonary edema ([address]): new and requires workup  Acute respiratory failure with hypoxia [facility]): new and requires workup  SOB (shortness of breath): new and requires workup  Diagnosis management comments: Medical decision making note:  Dyspnea with hypoxia, suspect pulmonary edema , add on a BNP, start IV Lasix diuresis  Admit to hospitalist  This concludes the \"medical decision making note\" part of this emergency department visit note. Amount and/or Complexity of Data Reviewed  Clinical lab tests: reviewed and ordered  Tests in the radiology section of CPT®: reviewed and ordered (XR CHEST PORT   Final Result    Findings most consistent with pulmonary edema with a right pleural effusion.      CT CHEST PULMONARY EMBOLISM    (Results Pending)  )  Decide to obtain previous medical records or to obtain history from someone other than the patient: yes  Obtain history from someone other than the patient: yes  Discuss the patient with other providers: yes (Hospitalist service)    Risk of Complications, Morbidity, and/or Mortality  Presenting problems: moderate  Diagnostic procedures: low  Management options: low    Patient Progress  Patient progress: improved         Procedures

## 2021-10-29 NOTE — ED NOTES
Pt placed in room 6, pt O2 sat 65% after getting into bed. Pt placed on 4L of NCO2. Dr Glenroy Wilkins notified.

## 2021-10-30 PROBLEM — E83.42 HYPOMAGNESEMIA: Status: ACTIVE | Noted: 2021-10-30

## 2021-10-30 LAB
ANION GAP SERPL CALC-SCNC: 4 MMOL/L (ref 7–16)
ATRIAL RATE: 77 BPM
BNP SERPL-MCNC: 1502 PG/ML
BUN SERPL-MCNC: 10 MG/DL (ref 8–23)
CALCIUM SERPL-MCNC: 8 MG/DL (ref 8.3–10.4)
CALCULATED P AXIS, ECG09: 18 DEGREES
CALCULATED R AXIS, ECG10: 4 DEGREES
CALCULATED T AXIS, ECG11: 40 DEGREES
CHLORIDE SERPL-SCNC: 109 MMOL/L (ref 98–107)
CO2 SERPL-SCNC: 30 MMOL/L (ref 21–32)
CREAT SERPL-MCNC: 0.75 MG/DL (ref 0.6–1)
DIAGNOSIS, 93000: NORMAL
EST. AVERAGE GLUCOSE BLD GHB EST-MCNC: 108 MG/DL
GLUCOSE BLD STRIP.AUTO-MCNC: 129 MG/DL (ref 65–100)
GLUCOSE BLD STRIP.AUTO-MCNC: 147 MG/DL (ref 65–100)
GLUCOSE SERPL-MCNC: 105 MG/DL (ref 65–100)
HBA1C MFR BLD: 5.4 % (ref 4.2–6.3)
MAGNESIUM SERPL-MCNC: 1.7 MG/DL (ref 1.8–2.4)
P-R INTERVAL, ECG05: 134 MS
POTASSIUM SERPL-SCNC: 4.4 MMOL/L (ref 3.5–5.1)
Q-T INTERVAL, ECG07: 400 MS
QRS DURATION, ECG06: 74 MS
QTC CALCULATION (BEZET), ECG08: 452 MS
SERVICE CMNT-IMP: ABNORMAL
SERVICE CMNT-IMP: ABNORMAL
SODIUM SERPL-SCNC: 143 MMOL/L (ref 136–145)
TROPONIN-HIGH SENSITIVITY: 16.8 PG/ML (ref 0–14)
VENTRICULAR RATE, ECG03: 77 BPM

## 2021-10-30 PROCEDURE — 74011250637 HC RX REV CODE- 250/637: Performed by: FAMILY MEDICINE

## 2021-10-30 PROCEDURE — 36415 COLL VENOUS BLD VENIPUNCTURE: CPT

## 2021-10-30 PROCEDURE — 51798 US URINE CAPACITY MEASURE: CPT

## 2021-10-30 PROCEDURE — 83735 ASSAY OF MAGNESIUM: CPT

## 2021-10-30 PROCEDURE — 65660000000 HC RM CCU STEPDOWN

## 2021-10-30 PROCEDURE — 80048 BASIC METABOLIC PNL TOTAL CA: CPT

## 2021-10-30 PROCEDURE — 83880 ASSAY OF NATRIURETIC PEPTIDE: CPT

## 2021-10-30 PROCEDURE — 74011250636 HC RX REV CODE- 250/636: Performed by: FAMILY MEDICINE

## 2021-10-30 PROCEDURE — 83036 HEMOGLOBIN GLYCOSYLATED A1C: CPT

## 2021-10-30 PROCEDURE — 82962 GLUCOSE BLOOD TEST: CPT

## 2021-10-30 PROCEDURE — 84484 ASSAY OF TROPONIN QUANT: CPT

## 2021-10-30 RX ORDER — POTASSIUM CHLORIDE 20 MEQ/1
40 TABLET, EXTENDED RELEASE ORAL 2 TIMES DAILY
Status: DISPENSED | OUTPATIENT
Start: 2021-10-30 | End: 2021-11-01

## 2021-10-30 RX ORDER — FUROSEMIDE 10 MG/ML
40 INJECTION INTRAMUSCULAR; INTRAVENOUS 2 TIMES DAILY
Status: DISCONTINUED | OUTPATIENT
Start: 2021-10-30 | End: 2021-11-02 | Stop reason: HOSPADM

## 2021-10-30 RX ORDER — LANOLIN ALCOHOL/MO/W.PET/CERES
400 CREAM (GRAM) TOPICAL 2 TIMES DAILY
Status: DISCONTINUED | OUTPATIENT
Start: 2021-10-30 | End: 2021-11-02 | Stop reason: HOSPADM

## 2021-10-30 RX ORDER — MAGNESIUM SULFATE HEPTAHYDRATE 40 MG/ML
2 INJECTION, SOLUTION INTRAVENOUS ONCE
Status: COMPLETED | OUTPATIENT
Start: 2021-10-30 | End: 2021-10-30

## 2021-10-30 RX ORDER — IBUPROFEN 200 MG
400 TABLET ORAL
Status: DISCONTINUED | OUTPATIENT
Start: 2021-10-30 | End: 2021-11-02 | Stop reason: HOSPADM

## 2021-10-30 RX ADMIN — METOPROLOL TARTRATE 25 MG: 25 TABLET, FILM COATED ORAL at 09:33

## 2021-10-30 RX ADMIN — PRAVASTATIN SODIUM 80 MG: 20 TABLET ORAL at 01:49

## 2021-10-30 RX ADMIN — GABAPENTIN 400 MG: 400 CAPSULE ORAL at 21:27

## 2021-10-30 RX ADMIN — FLUOXETINE 20 MG: 20 CAPSULE ORAL at 06:09

## 2021-10-30 RX ADMIN — FUROSEMIDE 40 MG: 10 INJECTION, SOLUTION INTRAMUSCULAR; INTRAVENOUS at 18:22

## 2021-10-30 RX ADMIN — POTASSIUM CHLORIDE 40 MEQ: 20 TABLET, EXTENDED RELEASE ORAL at 09:33

## 2021-10-30 RX ADMIN — ALPRAZOLAM 0.25 MG: 0.5 TABLET ORAL at 09:39

## 2021-10-30 RX ADMIN — HYDROCODONE BITARTRATE AND ACETAMINOPHEN 1 TABLET: 10; 325 TABLET ORAL at 06:09

## 2021-10-30 RX ADMIN — FUROSEMIDE 40 MG: 10 INJECTION, SOLUTION INTRAMUSCULAR; INTRAVENOUS at 03:10

## 2021-10-30 RX ADMIN — FENOFIBRATE 160 MG: 160 TABLET ORAL at 09:33

## 2021-10-30 RX ADMIN — GABAPENTIN 400 MG: 400 CAPSULE ORAL at 12:46

## 2021-10-30 RX ADMIN — HYDROCODONE BITARTRATE AND ACETAMINOPHEN 1 TABLET: 10; 325 TABLET ORAL at 19:58

## 2021-10-30 RX ADMIN — LEVOTHYROXINE SODIUM 75 MCG: 0.07 TABLET ORAL at 07:34

## 2021-10-30 RX ADMIN — DONEPEZIL HYDROCHLORIDE 10 MG: 5 TABLET, FILM COATED ORAL at 21:27

## 2021-10-30 RX ADMIN — BUSPIRONE HYDROCHLORIDE 20 MG: 5 TABLET ORAL at 21:27

## 2021-10-30 RX ADMIN — Medication 10 ML: at 21:27

## 2021-10-30 RX ADMIN — Medication 10 MG: at 21:27

## 2021-10-30 RX ADMIN — PRAVASTATIN SODIUM 80 MG: 20 TABLET ORAL at 21:27

## 2021-10-30 RX ADMIN — MAGNESIUM SULFATE HEPTAHYDRATE 2 G: 40 INJECTION, SOLUTION INTRAVENOUS at 12:46

## 2021-10-30 RX ADMIN — POLYETHYLENE GLYCOL 3350 17 G: 17 POWDER, FOR SOLUTION ORAL at 09:00

## 2021-10-30 RX ADMIN — GABAPENTIN 400 MG: 400 CAPSULE ORAL at 18:22

## 2021-10-30 RX ADMIN — BUSPIRONE HYDROCHLORIDE 20 MG: 5 TABLET ORAL at 16:20

## 2021-10-30 RX ADMIN — GABAPENTIN 400 MG: 400 CAPSULE ORAL at 09:33

## 2021-10-30 RX ADMIN — Medication 400 MG: at 18:22

## 2021-10-30 RX ADMIN — CELECOXIB 100 MG: 100 CAPSULE ORAL at 18:22

## 2021-10-30 RX ADMIN — Medication 400 MG: at 09:33

## 2021-10-30 RX ADMIN — MIRTAZAPINE 45 MG: 15 TABLET, FILM COATED ORAL at 21:27

## 2021-10-30 RX ADMIN — IBUPROFEN 400 MG: 400 TABLET, FILM COATED ORAL at 09:39

## 2021-10-30 RX ADMIN — ENOXAPARIN SODIUM 40 MG: 40 INJECTION SUBCUTANEOUS at 09:34

## 2021-10-30 RX ADMIN — CELECOXIB 100 MG: 100 CAPSULE ORAL at 09:34

## 2021-10-30 RX ADMIN — POTASSIUM CHLORIDE 40 MEQ: 20 TABLET, EXTENDED RELEASE ORAL at 18:22

## 2021-10-30 RX ADMIN — BUSPIRONE HYDROCHLORIDE 20 MG: 5 TABLET ORAL at 09:34

## 2021-10-30 RX ADMIN — LOSARTAN POTASSIUM 25 MG: 50 TABLET, FILM COATED ORAL at 09:33

## 2021-10-30 NOTE — ED NOTES
TRANSFER - OUT REPORT:    Verbal report given to Sondra De Paz on [facility]  being transferred to ICU for routine progression of care       Report consisted of patients Situation, Background, Assessment and   Recommendations(SBAR). Information from the following report(s) SBAR was reviewed with the receiving nurse. Lines:   Peripheral IV 10/29/21 Left Antecubital (Active)   Site Assessment Clean, dry, & intact 10/29/21 1915   Phlebitis Assessment 0 10/29/21 1915   Infiltration Assessment 0 10/29/21 1915   Dressing Status Clean, dry, & intact 10/29/21 1915   Dressing Type Gauze;Tape;Transparent 10/29/21 1915   Hub Color/Line Status Pink;Flushed 10/29/21 1915   Action Taken Blood drawn 10/29/21 1915        Opportunity for questions and clarification was provided.       Patient transported with:   Registered Nurse   O2  Monitor

## 2021-10-30 NOTE — PROGRESS NOTES
Primary Nurse Mario Alberto Irving RN and Kana Arizmendi RN performed a dual skin assessment on this patient No impairment noted  Nilson score is 14

## 2021-10-30 NOTE — PROGRESS NOTES
Comprehensive Nutrition Assessment    Type and Reason for Visit: Initial, Positive nutrition screen  Best Practice Alert for Malnutrition Screening Tool: Recently Lost Weight Without Trying: Yes, If Yes, How Much Weight Loss: 2-13 lbs, Eating Poorly Due to Decreased Appetite: Yes    Nutrition Recommendations/Plan:    Add 2g Na restriction   Continue Diabetic Supplement 1x daily     Malnutrition Assessment:  Malnutrition Status: No malnutrition    Nutrition Assessment:   Nutrition History: PMH: HTN, HF, pulmonary HTN, poor historian, depression, prediabetes, late onset alzheimers dementia  Cultural/Adventist/Ethnic Food Preference(s): None   Nutrition Background: Pt admitted for acute hypoxemic respiratory failure due to CHF exacerbation  Daily Update:  Pt reports eating well today, but having some decreased intake from baseline prior to admission for several weeks. She was drinking Ensure 1x daily at home. States UBW is 185#, which is also CBW. NFPE revealed no muscle or fat loss.      Labs: elevated BNP  Meds: IV lasix, insulin    Nutrition Related Findings:   no muscle or fat loss Wound Type: None    Current Nutrition Therapies:  ADULT DIET Regular; 4 carb choices (60 gm/meal)  ADULT ORAL NUTRITION SUPPLEMENT Lunch; Standard High Calorie/High Protein    Current Intake:   Average Meal Intake:  (\"I ate pretty good today\") Average Supplement Intake:  (consumed some)      Anthropometric Measures:  Height: 5' 5\" (165.1 cm)  Current Body Wt: 83.8 kg (184 lb 11.9 oz), Weight source: Bed scale  BMI: 30.7, Obese class 1 (BMI 30.0-34.9)     Ideal Body Weight (lbs) (Calculated): 125 lbs (57 kg), 147.8 %  Usual Body Wt: 86 kg (189 lb 9.5 oz) (stable over last 3 months per EMR review), Percent weight change: -2.6          Edema: Generalized: No Edema (10/30/2021  7:27 AM)     Estimated Daily Nutrient Needs:  Energy (kcal/day): 1676-2095kcal/d (20-25kcal/kg) (Kcal/kg, Weight Used: Current)  Protein (g/day): 84-100g/d (1-1.2g/kg) Weight Used: (Current)  Fluid (ml/day):   (1 ml/kcal)    Nutrition Diagnosis:   · Predicted inadequate energy intake related to  (decreased appetite) as evidenced by  (pt reports decreased intake PTA)    Nutrition Interventions:   Food and/or Nutrient Delivery: Modify current diet, Continue oral nutrition supplement     Coordination of Nutrition Care: Continue to monitor while inpatient    Goals: Active Goal: Consume 75% of est needs with meals and supplements by RD follow up    Nutrition Monitoring and Evaluation:      Food/Nutrient Intake Outcomes: Food and nutrient intake, Supplement intake       Discharge Planning:     Too soon to determine    Giuseppe Cruz, [address], [address]    Disaster Mode Active

## 2021-10-30 NOTE — PROGRESS NOTES
SW reviewed patient's chart and conducted a baseline assessment. Discharge plan at this time is as follows:     Care Management Interventions  PCP Verified by CM: Yes  Mode of Transport at Discharge: Self  Transition of Care Consult (CM Consult): [address]: No  Reason Outside [place]: Patient already serviced by other home [id number]: Child(calri), Other Family Member(s)  Confirm Follow Up Transport: Family  The Plan for Transition of Care is Related to the Following Treatment Goals : PT/OT/RN  The Patient and/or Patient Representative was Provided with a Choice of Provider and Agrees with the Discharge Plan?: Yes  Name of the Patient Representative Who was Provided with a Choice of Provider and Agrees with the Discharge Plan: Tripp Johnson  Freedom of Choice List was Provided with Basic Dialogue that Supports the Patient's Individualized Plan of Care/Goals, Treatment Preferences and Shares the Quality Data Associated with the Providers?: Yes  Discharge Location  Discharge Placement: Home with home health      *Please note that discharge plans can change throughout an inpatient admission.  Ensure that you are referring to the most recent social work/nurse case management note for current discharge plan*     Kiersten Peña, [address] [place] [address] Rd Work   [facility]    * [email or web address]

## 2021-10-30 NOTE — PROGRESS NOTES
Pt admitted from ER on 4LNC. SVT runs of HR up to 160s noted upon monitor. MD blevins at bedside and notified and restarted home PO metoprolol. Labs redrawn. Pt is comfortable and resting in bed. Post op nerve block pump and sling noted on patient from previous surgery.  MD notified- no new orders for pump- okay to keep intact until pump runs out per MD Karen Colon

## 2021-10-30 NOTE — PROGRESS NOTES
SW met with patient who confirmed demographic information, states that she lives alone and has a son who lives locally. The patient has another son who passed away, but his wife Jarvis Shipman lives locally and is very supportive. Patient is seen by Dr. Donald Villeda for primary care and is current. Patient currently requiring supplemental oxygen, states that she does not use it at home. Patient states that she uses a cane to ambulate, denies any falls, and states that she's been independent in her ADLs (even s/p shoulder surgery). Patient was referred to [facility] following her surgery and states that her plan is to return home with [facility] resumed. Resumption orders written and referral packet prepared.     Mariaelena Seo LMSW    [facility]    * [email or web address]

## 2021-10-30 NOTE — H&P
Hospitalist History and Physical   Admit Date:  10/29/2021  6:49 PM   Name:  Venu Benson   Age:  67 y.o. Sex:  female  :  1945   MRN:  [id number]   Room:  ER    Presenting Complaint: Anxiety and Shortness of Breath    Reason(s) for Admission: Acute hypoxemic respiratory failure ([facility] [J96.01]     History of Present Illness: This patient was discussed with the ER provider prior to seeing the patient. Pertinent history, physical, diagnostics, initial treatments, and further plans reviewed. Venu Benson is a 67 y.o. female with medical history of hypertension, diastolic heart failure, pulmonary hypertension, and shoulder surgery yesterday,,who presented with shortness of breath and anxiety. Patient reports that she was admitted to [facility] yesterday for shoulder surgery, and was discharged today. She does states she was on oxygen at times during her hospital stay. She denies lung disease, or significant cardiac disease. She states she does not have a history of heart failure. She states she had an echocardiogram recently, as far she knows that was normal.  Initially she stated her symptoms started yesterday. However she tells me she has had trouble breathing when lying flat for probably a couple of weeks. She denies lower extremity edema. Her O2 saturations were noted to be as low as 65% on room air. She did respond well to supplemental oxygen initially, but then had some desaturation with exertion. Chest x-ray done in the ER was concerning for pulmonary edema. She was given IV Lasix in the ER and she has had good output. We are asked to admit for further evaluation and management. She denies fever/chills, NVD, abdominal pain, chest pain, cough. She is fully vaccinated for Covid, but has not had a booster shot. Review of Systems:  10 systems reviewed and negative except as noted in HPI.     Assessment & Plan:     Principal Problem:    Acute hypoxemic respiratory failure ([facility] (10/29/2021)  Appears to be due to pulmonary edema/fluid overload, possibly postoperative. Potentially a mild CHF exacerbation. Continue with IV Lasix and reevaluate tomorrow    Active Problems:    Recurrent major depressive disorder, in partial remission ([address] (2/25/2018)  Continue home medications      Chronic diastolic CHF (congestive heart failure) ([facility] (10/25/2021)  Appears to be mild and patient was prescribed Lasix only as needed      Essential hypertension (1/5/2016)  Continue home medications      Pulmonary hypertension ([facility] (10/28/2021)  This is mild according to last echo      History of shoulder surgery (10/29/2021)  Done yesterday, doing well postoperatively, pain controlled  Surgery done at [facility], we will not consult our orthopedist unless significant reason arises. Dispo/Discharge Planning:   Admit to ICU, due to ER concerned of possibly needing BiPAP.     Diet:  regular diet  VTE ppx:  Lovenox  Code status: full code, her son is her next of kin, she has not named a specific Stir 2365 Problems as of 10/29/2021 Date Reviewed: 4/12/2021        Codes Class Noted - Resolved POA    * (Principal) Acute hypoxemic respiratory failure ([facility] ICD-10-CM: J96.01  ICD-9-CM: 518.81  10/29/2021 - Present Yes        History of shoulder surgery ICD-10-CM: Z98.890  ICD-9-CM: V45.89  10/29/2021 - Present Yes    Overview Signed 10/29/2021  9:20 PM by Jitendra Pires MD     Had outpt Rt shoulder surgery on 10/28/21 at [facility]             Pulmonary hypertension ([facility] (Chronic) ICD-10-CM: I27.20  ICD-9-CM: 416.8  10/28/2021 - Present Yes        Chronic diastolic CHF (congestive heart failure) ([facility] (Chronic) ICD-10-CM: I50.32  ICD-9-CM: 428.32, 428.0  10/25/2021 - Present Yes        Recurrent major depressive disorder, in partial remission ([facility] (Chronic) ICD-10-CM: F33.41  ICD-9-CM: 296.35  2/25/2018 - Present Yes    Overview Signed 10/29/2021  9:13 PM by Jitendra Pires, MD     With associated Anxiety             Generalized anxiety disorder ICD-10-CM: F41.1  ICD-9-CM: 300.02  11/17/2016 - Present         Essential hypertension (Chronic) ICD-10-CM: I10  ICD-9-CM: 401.9  1/5/2016 - Present Yes    Overview Signed 10/29/2021  9:15 PM by Asaf Keith MD     Last Assessment & Plan:   Formatting of this note might be different from the original.  Encouraged low sodium diet; Goal: take meds as prescribed, monitor blood pressure at home and call with readings                   Past History:  Past Medical History:   Diagnosis Date    Adjustment disorder with anxious mood 10/3/2017    Arthritis     osteo-- all over    Atrial fibrillation ([facility] 2016    per cardio note dated 8/16/16= had a single episode of paroxysmal atrial fibrillation in hospital under stress. No recurrence. CHADS2-vasc of 1. NO change in Rx. Continue BBlocker. No need for anticoag.  BMI 31.0-31.9,adult     Cataract of both eyes     CHF (congestive heart failure) ([facility]     Per PCP office note dated 12/08/2020;  lasix as needed    Chronic pain     back pain    Dementia ([address]     per PCP office note dated 12/08/2020, aricept daily. Pt states she tends to forget names and has trouble remenbering names of certain items.     Generalized anxiety disorder 11/17/2016    GERD (gastroesophageal reflux disease)     hiatal hernia, managed with otc meds prn     High blood pressure     on med for control     High cholesterol     on med for control    Insomnia secondary to anxiety 11/17/2016    Irregular heart beat     Left knee pain     Major depressive disorder, recurrent ([address] 11/17/2016    managed with meds     Neuropathy     Bilateral feet     Osteoarthritis of left hip 3/11/2021    Overweight (BMI 25.0-29.9) 2/25/2018    Poor historian     Prediabetes     no meds or bs checks at home; A1C=5.7 on 02/16/21    Primary insomnia 11/17/2016    PUD (peptic ulcer disease)     hiatal hernia; denies ulcers  Thyroid disease     hypo; on med for control     Unresolved grief 6/22/2017    Vitamin B 12 deficiency      Past Surgical History:   Procedure Laterality Date    HX BACK SURGERY      spinal cord stimulator placed and then removed     HX HIP REPLACEMENT      HX HYSTERECTOMY      HX KNEE REPLACEMENT Right     HX KNEE REPLACEMENT Left     HX LAP CHOLECYSTECTOMY      PELVIS/HIP JOINT SURGERY UNLISTED Left 03/11/2021    hip replacement      Allergies   Allergen Reactions    Morphine Nausea Only    Oxycodone Nausea and Vomiting    Oxycodone-Acetaminophen Nausea Only      Social History     Tobacco Use    Smoking status: Never Smoker    Smokeless tobacco: Never Used   Substance Use Topics    Alcohol use: No      Family History   Problem Relation Age of Onset    Cancer Mother     Heart Attack Father     Hypertension Father     Kidney Disease Father     Tuberculosis Father         Family history reviewed and negative except as otherwise noted.     Immunization History   Administered Date(s) Administered    COVID-19, Moderna, Primary or Immunocompromised Series, MRNA, PF, 100mcg/0.5mL 02/25/2021, 07/14/2021    Influenza High Dose Vaccine PF 10/11/2017, 10/29/2019    Influenza Nasal Vaccine 10/01/2015    Influenza Vaccine 10/19/2011, 11/28/2012, 11/19/2013, 10/20/2014, 09/05/2016, 09/13/2018    Influenza Vaccine (Quadrivalent)(>18 Yrs Flublok 54050) 10/06/2020    Influenza, High-dose, Quadrivalent (>65 Yrs Fluzone High Dose Quad 38802) 10/01/2020    Pneumococcal Conjugate (PCV-13) 09/05/2016    Pneumococcal Polysaccharide (PPSV-23) 11/28/2012     Prior to Admit Medications:  Current Outpatient Medications   Medication Instructions    ALPRAZolam (XANAX) 0.25 mg, Oral, DAILY AS NEEDED    ascorbic acid, vitamin C, (VITAMIN C) 500 mg tablet 1 Tablet, Oral, DAILY    busPIRone (BUSPAR) 20 mg, Oral, 3 TIMES DAILY    celecoxib (CELEBREX) 100 mg, Oral, 2 TIMES DAILY    cholecalciferol, vitamin D3, (VITAMIN D3 PO) 1 Tablet, Oral, DAILY    cyanocobalamin (VITAMIN B12) 500 mcg tablet 1 Tablet, Oral, DAILY    donepeziL (ARICEPT) 10 mg, Oral, EVERY BEDTIME    fenofibric acid (TRILIPIX ER) 135 mg, Oral, DAILY    ferrous sulfate 325 mg (65 mg iron) tablet 1 Tablet, Oral, 3 TIMES WEEKLY    FLUoxetine (PROZAC) 20 mg, Oral, 7AM    furosemide (LASIX) 40 mg, DAILY AS NEEDED    gabapentin (NEURONTIN) 800 mg, Oral, 3 TIMES DAILY    glucose blood VI test strips (ASCENSIA AUTODISC VI, ONE TOUCH ULTRA TEST VI) strip Does Not Apply    HYDROcodone-acetaminophen (Norco) 7.5-325 mg per tablet Norco 7.5 mg-325 mg tablet   take 1 po tid prn pain  Diagnosis:  M54.5  G89.29    hyoscyamine (ANASPAZ, LEVSIN) 0.125 mg tablet hyoscyamine sulfate 0.125 mg tablet   TAKE 1 TABLET BY MOUTH THREE TIMES DAILY AS NEEDED FOR CRAMPING OR DIARRHEA    lancets 30 gauge misc Does Not Apply    levothyroxine (SYNTHROID) 75 mcg, Oral, DAILY BEFORE BREAKFAST    losartan (COZAAR) 25 mg, Oral, DAILY    melatonin 10 mg, Oral, EVERY BEDTIME    metoprolol tartrate (LOPRESSOR) 12.5 mg, Oral, EVERY 12 HOURS    mirtazapine (REMERON) 45 mg, Oral, EVERY BEDTIME    miscellaneous medical supply misc Diabetic shoes    NARCAN 4 mg/actuation nasal spray ADMINISTER A SINGLE SPRAY IN ONE NOSTRIL UPON SIGNS OF OPIOID OVERDOSE. CALL 911. REPEAT AFTER 3 MINUTES IF NO RESPONSE.  nitroglycerin (NITROSTAT) 0.4 mg, SubLINGual, EVERY 5 MIN AS NEEDED, Max of 3 doses. Call 911 if no relief after 3 doses.      potassium chloride SR (KLOR-CON 10) 10 mEq tablet 10 mEq, Oral, DAILY AS NEEDED    pravastatin (PRAVACHOL) 80 mg, Oral, EVERY BEDTIME       Objective:     Patient Vitals for the past 24 hrs:   Temp Pulse Resp BP SpO2   10/29/21 2000  79      10/29/21 1953     94 %   10/29/21 1844 98.5 °F (36.9 °C) 84 18 (!) 123/94 (!) 82 %     Oxygen Therapy  O2 Sat (%): 94 % (10/29/21 1953)  Pulse via Oximetry: 74 beats per minute (10/29/21 1953)  O2 Device: Nasal cannula (10/29/21 1953)  O2 Flow Rate (L/min): 3 l/min (10/29/21 1953)    Estimated body mass index is 30.79 kg/m² as calculated from the following:    Height as of this encounter: 5' 5\" (1.651 m). Weight as of this encounter: 83.9 kg (185 lb). No intake or output data in the 24 hours ending 10/29/21 2129      Physical Exam:    Blood pressure (!) 123/94, pulse 79, temperature 98.5 °F (36.9 °C), resp. rate 18, height 5' 5\" (1.651 m), weight 83.9 kg (185 lb), SpO2 94 %. General:    Well nourished. No apparent distress  HENT:  Normocephalic, atraumatic. Nares appear normal, no drainage. Oropharynx is clear with tacky mucous membranes  Eyes:  Sclerae appear normal.  PERRL; EOMI  Neck:  No restricted ROM. Trachea midline   CV:   RRR. No jugular venous distension. No LE edema  Lungs:   CTAB. No wheezing, rhonchi, or rales. Respirations even, unlabored  Abdomen: Bowel sounds present. Soft, nontender, nondistended. Musc/Sk: No cyanosis or clubbing. Skin:     No obvious rashes and normal coloration. Warm and dry. Neuro:  CN II-XII grossly intact.   Eye exam as noted above; Moves extremities equally and appropriately  Psych:   Alert and oriented x 4; Judgement and insight are normal;     I have reviewed ordered lab tests and reports of imaging below:    Last 24hr Labs:  Recent Results (from the past 24 hour(s))   METABOLIC PANEL, BASIC    Collection Time: 10/29/21  7:16 PM   Result Value Ref Range    Sodium 144 136 - 145 mmol/L    Potassium 4.3 3.5 - 5.1 mmol/L    Chloride 109 (H) 98 - 107 mmol/L    CO2 31 21 - 32 mmol/L    Anion gap 4 (L) 7 - 16 mmol/L    Glucose 143 (H) 65 - 100 mg/dL    BUN 11 8 - 23 MG/DL    Creatinine 0.87 0.6 - 1.0 MG/DL    GFR est AA >60 >60 ml/min/1.73m2    GFR est non-AA >60 >60 ml/min/1.73m2    Calcium 8.8 8.3 - 10.4 MG/DL   CBC WITH AUTOMATED DIFF    Collection Time: 10/29/21  7:16 PM   Result Value Ref Range    WBC 12.3 (H) 4.3 - 11.1 K/uL    RBC 3.23 (L) 4.05 - 5.2 M/uL    HGB 10.1 (L) 11.7 - 15.4 g/dL    HCT 32.1 (L) 35.8 - 46.3 %    MCV 99.4 (H) 79.6 - 97.8 FL    MCH 31.3 26.1 - 32.9 PG    MCHC 31.5 31.4 - 35.0 g/dL    RDW 13.2 11.9 - 14.6 %    PLATELET 028 806 - 186 K/uL    MPV 11.4 9.4 - 12.3 FL    ABSOLUTE NRBC 0.00 0.0 - 0.2 K/uL    DF AUTOMATED      NEUTROPHILS 89 (H) 43 - 78 %    LYMPHOCYTES 5 (L) 13 - 44 %    MONOCYTES 5 4.0 - 12.0 %    EOSINOPHILS 0 (L) 0.5 - 7.8 %    BASOPHILS 0 0.0 - 2.0 %    IMMATURE GRANULOCYTES 0 0.0 - 5.0 %    ABS. NEUTROPHILS 10.9 (H) 1.7 - 8.2 K/UL    ABS. LYMPHOCYTES 0.7 0.5 - 4.6 K/UL    ABS. MONOCYTES 0.7 0.1 - 1.3 K/UL    ABS. EOSINOPHILS 0.0 0.0 - 0.8 K/UL    ABS. BASOPHILS 0.0 0.0 - 0.2 K/UL    ABS. IMM.  GRANS. 0.1 0.0 - 0.5 K/UL   PROTHROMBIN TIME + INR    Collection Time: 10/29/21  7:16 PM   Result Value Ref Range    Prothrombin time 13.8 12.6 - 14.5 sec    INR 1.0     D DIMER    Collection Time: 10/29/21  7:16 PM   Result Value Ref Range    D DIMER 0.86 (H) <0.56 ug/ml(FEU)   LACTIC ACID    Collection Time: 10/29/21  7:16 PM   Result Value Ref Range    Lactic acid 2.5 (H) 0.4 - 2.0 MMOL/L   TROPONIN-HIGH SENSITIVITY    Collection Time: 10/29/21  7:16 PM   Result Value Ref Range    Troponin-High Sensitivity 18.5 (H) 0 - 14 pg/mL   NT-PRO BNP    Collection Time: 10/29/21  7:16 PM   Result Value Ref Range    NT pro-BNP 1,451 (H) <450 PG/ML   EKG, 12 LEAD, INITIAL    Collection Time: 10/29/21  7:19 PM   Result Value Ref Range    Ventricular Rate 77 BPM    Atrial Rate 77 BPM    P-R Interval 134 ms    QRS Duration 74 ms    Q-T Interval 400 ms    QTC Calculation (Bezet) 452 ms    Calculated P Axis 18 degrees    Calculated R Axis 4 degrees    Calculated T Axis 40 degrees    Diagnosis       Normal sinus rhythm  Normal ECG  When compared with ECG of 16-FEB-2021 13:07,  No significant change was found     POC VENOUS BLOOD GAS    Collection Time: 10/29/21  7:50 PM   Result Value Ref Range    Device: RIGHT RADIAL      FIO2 (POC) 21 %    pH, venous (POC) 7.39 7.32 - 7.42      pCO2, venous (POC) 47.2 41 - 51 MMHG    pO2, venous (POC) 40 mmHg    HCO3, venous (POC) 28.5 (H) 23 - 28 MMOL/L    sO2, venous (POC) 73.7 65 - 88 %    Base excess, venous (POC) 3.0 mmol/L    Allens test (POC) Positive      Site LEFT RADIAL      Specimen type (POC) VENOUS BLOOD      Performed by [email or web address]        All Micro Results     None          Other Studies:  XR CHEST PORT    Result Date: 10/29/2021  EXAM: XR CHEST PORT INDICATION: dyspnea / hypoxia, recent shoulder surgery COMPARISON: None. FINDINGS: A portable AP radiograph of the chest was obtained at 1907 hours. The patient is on a cardiac monitor. I lateral airspace disease with small right pleural effusion. The cardiac and mediastinal contours and pulmonary vascularity are normal.  The bones and soft tissues are grossly within normal limits. Findings most consistent with pulmonary edema with a right pleural effusion. Medications Administered     albuterol-ipratropium (DUO-NEB) 2.5 MG-0.5 MG/3 ML     Admin Date  10/29/2021 Action  Given Dose  3 mL Route  Nebulization Administered By  Cindy Alva, RT          furosemide (LASIX) injection 80 mg     Admin Date  10/29/2021 Action  Given Dose  80 mg Route  IntraVENous Administered By  Gaviota Sena RN          nitroglycerin (NITROBID) 2 % ointment 1 Inch     Admin Date  10/29/2021 Action  Given Dose  1 Inch Route  Topical Administered By  Gaviota Sena RN              ECHO result from medical history:  [facility] [place] - 10/18/2021 11:12 AM EDT    This result has an attachment that is not available.    · The left ventricular systolic function is normal (55-65%). · Normal left ventricular diastolic function. · The right ventricular systolic function is normal.  · Mild mitral valve regurgitation. · Mild pulmonary HTN with RVSP estimated to be 33 mmHg    Left Ventricle  Normal left ventricle cavity size. Normal left ventricle wall thickness.  The systolic function is normal (55-65%). Normal left ventricular diastolic function. Normal wall motion. Right Ventricle  Normal right ventricular cavity size. Normal right ventricular systolic function. RV tissue doppler velocity is 8.9 cm/s. RVSP: 33 mmHg. Left Atrium  The left atrium is normal in size. Right Atrium  The right atrium is normal in size. IVC/SVC  The Inferior Vena Cava is normal in size. The IVC shows >50% inspiratory collapse. Mitral Valve  The mitral valve is normal. No mitral valve stenosis. There is mild mitral annular calcification. Mild mitral valve regurgitation. Tricuspid Valve  The tricuspid valve is normal. No tricuspid valve stenosis. Trace tricuspid valve regurgitation. Aortic Valve  No aortic valve stenosis. No aortic valve regurgitation. The aortic valve is tricuspid. Pulmonic Valve  No pulmonic valve regurgitation. The pulmonic valve was not well visualized. Pericardium  There is no pericardial effusion present. TTE Procedure Information  Image quality: Good. Images were obtained from the parasternal, apical, subcostal and suprasternal view(s). Color flow Doppler was performed and pulse wave and/or continuous wave Doppler was performed. Aorta  The aortic root is normal in size. Ascending aorta is normal in size.      Signed:  Laurie Medina MD    Part of this note may have been written by using a voice dictation software. The note has been proof read but may still contain some grammatical/other typographical errors.

## 2021-10-30 NOTE — PROGRESS NOTES
Hospitalist Progress Note   Admit Date:  10/29/2021  6:49 PM   Name:  Jimy Louis   Age:  67 y.o. Sex:  female  :  1945   MRN:  [id number]   Room:  [facility]    Presenting Complaint: Anxiety and Shortness of Breath    Reason(s) for Admission: Acute hypoxemic respiratory failure [facility]) [J96.01]     Hospital Course & Interval History: This patient was discussed with the ER provider prior to seeing the patient. Pertinent history, physical, diagnostics, initial treatments, and further plans reviewed.     Jimy Louis is a 67 y.o. female with medical history of hypertension, diastolic heart failure, pulmonary hypertension, and shoulder surgery yesterday,,who presented with shortness of breath and anxiety. Patient reports that she was admitted to [facility] yesterday for shoulder surgery, and was discharged today. She does states she was on oxygen at times during her hospital stay. She denies lung disease, or significant cardiac disease. She states she does not have a history of heart failure. She states she had an echocardiogram recently, as far she knows that was normal.  Initially she stated her symptoms started yesterday. However she tells me she has had trouble breathing when lying flat for probably a couple of weeks. She denies lower extremity edema. Her O2 saturations were noted to be as low as 65% on room air. She did respond well to supplemental oxygen initially, but then had some desaturation with exertion. Chest x-ray done in the ER was concerning for pulmonary edema. She was given IV Lasix in the ER and she has had good output. We are asked to admit for further evaluation and management. She denies fever/chills, NVD, abdominal pain, chest pain, cough. She is fully vaccinated for Covid, but has not had a booster shot    Subjective (10/30/21):   Says breathing better, presently requiring oxygen at 3 L/min    Assessment & Plan:     -Acute hypoxic respiratory failure secondary to CHF exacerbation  Presently on 3 L of oxygen nasal cannula  Continue Lasix 40 mg IV twice daily    -Recent right shoulder surgery  Continue pain medication  We will add Motrin    -Hypertension  Continue losartan and metoprolol    -Dementia  Continue Aricept    -Anxiety/depression  Continue BuSpar    -Hypothyroidism  Synthroid 75 mcg daily            Diet:  ADULT DIET Regular; 4 carb choices (60 gm/meal)  ADULT ORAL NUTRITION SUPPLEMENT Lunch; Standard High Calorie/High Protein  DVT PPx: Lovenox  Code status: Full Code    Hospital Problems as of 10/30/2021 Date Reviewed: 4/12/2021        Codes Class Noted - Resolved POA    Hypomagnesemia ICD-10-CM: E83.42  ICD-9-CM: 275.2  10/30/2021 - Present Unknown        * (Principal) Acute hypoxemic respiratory failure ([email or web address] 75.) ICD-10-CM: J96.01  ICD-9-CM: 518.81  10/29/2021 - Present Yes        History of shoulder surgery ICD-10-CM: Z98.890  ICD-9-CM: V45.89  10/29/2021 - Present Yes    Overview Signed 10/29/2021  9:20 PM by Asaf Keith MD     Had outpt Rt shoulder surgery on 10/28/21 at [facility]             Late onset Alzheimer dementia [facility]) (Chronic) ICD-10-CM: G30.1, F02.80  ICD-9-CM: 331.0  10/29/2021 - Present Yes        Pulmonary hypertension ([facility] [email or web address] 75.) (Chronic) ICD-10-CM: I27.20  ICD-9-CM: 416.8  10/28/2021 - Present Yes        Chronic diastolic CHF (congestive heart failure) (HCC) (Chronic) ICD-10-CM: I50.32  ICD-9-CM: 428.32, 428.0  10/25/2021 - Present Yes        Recurrent major depressive disorder, in partial remission ([facility] [email or web address] 75.) (Chronic) ICD-10-CM: F33.41  ICD-9-CM: 296.35  2/25/2018 - Present Yes    Overview Signed 10/29/2021  9:13 PM by Asaf Keith MD     With associated Anxiety             Generalized anxiety disorder ICD-10-CM: F41.1  ICD-9-CM: 300.02  11/17/2016 - Present         Dementia with behavioral disturbance ([facility].) ICD-10-CM: F03.91  ICD-9-CM: 294.21  7/16/2016 - Present     Overview Signed 10/29/2021  9:15 PM by Cain Turcios Jaxson Cabrera MD     Last Assessment & Plan:   Formatting of this note might be different from the original.  Seems a bit better today. See section under encephalopathy.              Essential hypertension (Chronic) ICD-10-CM: I10  ICD-9-CM: 401.9  1/5/2016 - Present Yes    Overview Signed 10/29/2021  9:15 PM by Jerry Parker MD     Last Assessment & Plan:   Formatting of this note might be different from the original.  Encouraged low sodium diet; Goal: take meds as prescribed, monitor blood pressure at home and call with readings                   Objective:     Patient Vitals for the past 24 hrs:   Temp Pulse Resp BP SpO2   10/30/21 1130 97.2 °F (36.2 °C) 69 24 (!) 103/47 95 %   10/30/21 1000  72 25 (!) 132/50 94 %   10/30/21 0900  66 24 (!) 123/46 95 %   10/30/21 0800  65 23 (!) 121/48 94 %   10/30/21 0727 97.8 °F (36.6 °C) 65 24 (!) 125/55 94 %   10/30/21 0700  63 22 (!) 109/43 94 %   10/30/21 0630  72 27 (!) 133/55 95 %   10/30/21 0600  63 30 (!) 143/64 97 %   10/30/21 0530  68 25 (!) 133/48 96 %   10/30/21 0500  63 27 (!) 129/54 96 %   10/30/21 0400  63 24 (!) 134/59 96 %   10/30/21 0300 97.7 °F (36.5 °C) 62 24 (!) 134/45 96 %   10/30/21 0200  60 25 (!) 128/57 96 %   10/30/21 0100  (!) 59 22 (!) 121/53 97 %   10/30/21 0030  61 24 (!) 107/54 98 %   10/30/21 0000 99 °F (37.2 °C) 62 21 (!) 124/57 97 %   10/29/21 2330  76 (!) 34 (!) 142/62 96 %   10/29/21 2300  84 (!) 32 (!) 140/64 96 %   10/29/21 2253  (!) 168      10/29/21 2241 99.2 °F (37.3 °C)       10/29/21 2216  87 (!) 34 (!) 150/77 95 %   10/29/21 2125     92 %   10/29/21 2011  89 (!) 43 (!) 156/83 93 %   10/29/21 2000  78      10/29/21 1953     94 %   10/29/21 1844 98.5 °F (36.9 °C) 84 18 (!) 123/94 (!) 82 %     Oxygen Therapy  O2 Sat (%): 95 % (10/30/21 1130)  Pulse via Oximetry: 69 beats per minute (10/30/21 1130)  O2 Device: Nasal cannula (10/30/21 0724)  Skin Assessment: Clean, dry, & intact (10/30/21 0300)  Skin Protection for O2 Device: No (10/30/21 0300)  O2 Flow Rate (L/min): 3 l/min (10/30/21 0727)    Estimated body mass index is 30.74 kg/m² as calculated from the following:    Height as of this encounter: 5' 5\" (1.651 m). Weight as of this encounter: 83.8 kg (184 lb 11.2 oz). Intake/Output Summary (Last 24 hours) at 10/30/2021 1150  Last data filed at 10/30/2021 0609  Gross per 24 hour   Intake    Output 3000 ml   Net -3000 ml         Physical Exam:     Blood pressure (!) 103/47, pulse 69, temperature 97.2 °F (36.2 °C), resp. rate 24, height 5' 5\" (1.651 m), weight 83.8 kg (184 lb 11.2 oz), SpO2 95 %, not currently breastfeeding. General:    Well nourished. Improving respiratory distress, presently on 3 L/min  Head:  Normocephalic, atraumatic  Eyes:  Sclerae appear normal.  Pupils equally round. ENT:  Nares appear normal, no drainage. Moist oral mucosa  Neck:  No restricted ROM. Trachea midline   CV:   RRR. No m/r/g. No jugular venous distension. Lungs:   Bilateral coarse breath sounds  Abdomen: Bowel sounds present. Soft, nontender, nondistended. Extremities: Right upper extremity, recent shoulder surgery, and sling  Skin:     No rashes and normal coloration. Warm and dry. Neuro:  CN II-XII grossly intact. Sensation intact. A&Ox3  Psych:  Normal mood and affect.       I have reviewed ordered lab tests and independently visualized imaging below:    Recent Labs:  Recent Results (from the past 48 hour(s))   METABOLIC PANEL, BASIC    Collection Time: 10/29/21  7:16 PM   Result Value Ref Range    Sodium 144 136 - 145 mmol/L    Potassium 4.3 3.5 - 5.1 mmol/L    Chloride 109 (H) 98 - 107 mmol/L    CO2 31 21 - 32 mmol/L    Anion gap 4 (L) 7 - 16 mmol/L    Glucose 143 (H) 65 - 100 mg/dL    BUN 11 8 - 23 MG/DL    Creatinine 0.87 0.6 - 1.0 MG/DL    GFR est AA >60 >60 ml/min/1.73m2    GFR est non-AA >60 >60 ml/min/1.73m2    Calcium 8.8 8.3 - 10.4 MG/DL   CBC WITH AUTOMATED DIFF    Collection Time: 10/29/21  7:16 PM   Result Value Ref Range    WBC 12.3 (H) 4.3 - 11.1 K/uL    RBC 3.23 (L) 4.05 - 5.2 M/uL    HGB 10.1 (L) 11.7 - 15.4 g/dL    HCT 32.1 (L) 35.8 - 46.3 %    MCV 99.4 (H) 79.6 - 97.8 FL    MCH 31.3 26.1 - 32.9 PG    MCHC 31.5 31.4 - 35.0 g/dL    RDW 13.2 11.9 - 14.6 %    PLATELET 238 836 - 771 K/uL    MPV 11.4 9.4 - 12.3 FL    ABSOLUTE NRBC 0.00 0.0 - 0.2 K/uL    DF AUTOMATED      NEUTROPHILS 89 (H) 43 - 78 %    LYMPHOCYTES 5 (L) 13 - 44 %    MONOCYTES 5 4.0 - 12.0 %    EOSINOPHILS 0 (L) 0.5 - 7.8 %    BASOPHILS 0 0.0 - 2.0 %    IMMATURE GRANULOCYTES 0 0.0 - 5.0 %    ABS. NEUTROPHILS 10.9 (H) 1.7 - 8.2 K/UL    ABS. LYMPHOCYTES 0.7 0.5 - 4.6 K/UL    ABS. MONOCYTES 0.7 0.1 - 1.3 K/UL    ABS. EOSINOPHILS 0.0 0.0 - 0.8 K/UL    ABS. BASOPHILS 0.0 0.0 - 0.2 K/UL    ABS. IMM.  GRANS. 0.1 0.0 - 0.5 K/UL   PROTHROMBIN TIME + INR    Collection Time: 10/29/21  7:16 PM   Result Value Ref Range    Prothrombin time 13.8 12.6 - 14.5 sec    INR 1.0     D DIMER    Collection Time: 10/29/21  7:16 PM   Result Value Ref Range    D DIMER 0.86 (H) <0.56 ug/ml(FEU)   LACTIC ACID    Collection Time: 10/29/21  7:16 PM   Result Value Ref Range    Lactic acid 2.5 (H) 0.4 - 2.0 MMOL/L   TROPONIN-HIGH SENSITIVITY    Collection Time: 10/29/21  7:16 PM   Result Value Ref Range    Troponin-High Sensitivity 18.5 (H) 0 - 14 pg/mL   NT-PRO BNP    Collection Time: 10/29/21  7:16 PM   Result Value Ref Range    NT pro-BNP 1,451 (H) <450 PG/ML   EKG, 12 LEAD, INITIAL    Collection Time: 10/29/21  7:19 PM   Result Value Ref Range    Ventricular Rate 77 BPM    Atrial Rate 77 BPM    P-R Interval 134 ms    QRS Duration 74 ms    Q-T Interval 400 ms    QTC Calculation (Bezet) 452 ms    Calculated P Axis 18 degrees    Calculated R Axis 4 degrees    Calculated T Axis 40 degrees    Diagnosis       Normal sinus rhythm  Normal ECG  When compared with ECG of 16-FEB-2021 13:07,  No significant change was found  Confirmed by Cipriano Delarosa ([id number]) on 10/30/2021 6:45:01 AM     POC VENOUS BLOOD GAS    Collection Time: 10/29/21  7:50 PM   Result Value Ref Range    Device: RIGHT RADIAL      FIO2 (POC) 21 %    pH, venous (POC) 7.39 7.32 - 7.42      pCO2, venous (POC) 47.2 41 - 51 MMHG    pO2, venous (POC) 40 mmHg    HCO3, venous (POC) 28.5 (H) 23 - 28 MMOL/L    sO2, venous (POC) 73.7 65 - 88 %    Base excess, venous (POC) 3.0 mmol/L    Allens test (POC) Positive      Site LEFT RADIAL      Specimen type (POC) VENOUS BLOOD      Performed by Mary Ann    TROPONIN-HIGH SENSITIVITY    Collection Time: 10/29/21  9:21 PM   Result Value Ref Range    Troponin-High Sensitivity 20.2 (H) 0 - 14 pg/mL   LACTIC ACID    Collection Time: 10/29/21  9:47 PM   Result Value Ref Range    Lactic acid 1.9 0.4 - 2.0 MMOL/L   METABOLIC PANEL, BASIC    Collection Time: 10/29/21 11:23 PM   Result Value Ref Range    Sodium 142 136 - 145 mmol/L    Potassium 3.7 3.5 - 5.1 mmol/L    Chloride 107 98 - 107 mmol/L    CO2 28 21 - 32 mmol/L    Anion gap 7 7 - 16 mmol/L    Glucose 160 (H) 65 - 100 mg/dL    BUN 11 8 - 23 MG/DL    Creatinine 0.86 0.6 - 1.0 MG/DL    GFR est AA >60 >60 ml/min/1.73m2    GFR est non-AA >60 >60 ml/min/1.73m2    Calcium 9.1 8.3 - 10.4 MG/DL   MAGNESIUM    Collection Time: 10/29/21 11:23 PM   Result Value Ref Range    Magnesium 1.7 (L) 1.8 - 2.4 mg/dL   HEMOGLOBIN A1C WITH EAG    Collection Time: 10/30/21  3:09 AM   Result Value Ref Range    Hemoglobin A1c 5.4 4.2 - 6.3 %    Est. average glucose 855 mg/dL   METABOLIC PANEL, BASIC    Collection Time: 10/30/21  3:09 AM   Result Value Ref Range    Sodium 143 136 - 145 mmol/L    Potassium 4.4 3.5 - 5.1 mmol/L    Chloride 109 (H) 98 - 107 mmol/L    CO2 30 21 - 32 mmol/L    Anion gap 4 (L) 7 - 16 mmol/L    Glucose 105 (H) 65 - 100 mg/dL    BUN 10 8 - 23 MG/DL    Creatinine 0.75 0.6 - 1.0 MG/DL    GFR est AA >60 >60 ml/min/1.73m2    GFR est non-AA >60 >60 ml/min/1.73m2    Calcium 8.0 (L) 8.3 - 10.4 MG/DL   MAGNESIUM    Collection Time: 10/30/21  3:09 AM   Result Value Ref Range    Magnesium 1.7 (L) 1.8 - 2.4 mg/dL   TROPONIN-HIGH SENSITIVITY    Collection Time: 10/30/21  3:09 AM   Result Value Ref Range    Troponin-High Sensitivity 16.8 (H) 0 - 14 pg/mL   NT-PRO BNP    Collection Time: 10/30/21  3:09 AM   Result Value Ref Range    NT pro-BNP 1,502 (H) <450 PG/ML   GLUCOSE, POC    Collection Time: 10/30/21  7:32 AM   Result Value Ref Range    Glucose (POC) 129 (H) 65 - 100 mg/dL    Performed by Romario    GLUCOSE, POC    Collection Time: 10/30/21 11:28 AM   Result Value Ref Range    Glucose (POC) 147 (H) 65 - 100 mg/dL    Performed by Romario        All Micro Results     None          Other Studies:  XR CHEST PORT    Result Date: 10/29/2021  EXAM: XR CHEST PORT INDICATION: dyspnea / hypoxia, recent shoulder surgery COMPARISON: None. FINDINGS: A portable AP radiograph of the chest was obtained at 1907 hours. The patient is on a cardiac monitor. I lateral airspace disease with small right pleural effusion. The cardiac and mediastinal contours and pulmonary vascularity are normal.  The bones and soft tissues are grossly within normal limits. Findings most consistent with pulmonary edema with a right pleural effusion.       Current Meds:  Current Facility-Administered Medications   Medication Dose Route Frequency    magnesium oxide (MAG-OX) tablet 400 mg  400 mg Oral BID    potassium chloride (K-DUR, KLOR-CON) SR tablet 40 mEq  40 mEq Oral BID    ibuprofen (MOTRIN) tablet 400 mg  400 mg Oral Q6H PRN    furosemide (LASIX) injection 40 mg  40 mg IntraVENous BID    magnesium sulfate 2 g/50 ml IVPB (premix or compounded)  2 g IntraVENous ONCE    ALPRAZolam (XANAX) tablet 0.25 mg  0.25 mg Oral DAILY PRN    busPIRone (BUSPAR) tablet 20 mg  20 mg Oral TID    celecoxib (CELEBREX) capsule 100 mg  100 mg Oral BID    donepeziL (ARICEPT) tablet 10 mg  10 mg Oral QHS    fenofibrate (LOFIBRA) tablet 160 mg  160 mg Oral DAILY    FLUoxetine (PROzac) capsule 20 mg  20 mg Oral 7am    gabapentin (NEURONTIN) capsule 400 mg  400 mg Oral QID    levothyroxine (SYNTHROID) tablet 75 mcg  75 mcg Oral ACB    losartan (COZAAR) tablet 25 mg  25 mg Oral DAILY    melatonin tablet 10 mg  10 mg Oral QHS    mirtazapine (REMERON) tablet 45 mg  45 mg Oral QHS    pravastatin (PRAVACHOL) tablet 80 mg  80 mg Oral QHS    insulin regular (NOVOLIN R, HUMULIN R) injection   SubCUTAneous AC&HS    dextrose 40% (GLUTOSE) oral gel 1 Tube  15 g Oral PRN    glucagon (GLUCAGEN) injection 1 mg  1 mg IntraMUSCular PRN    dextrose (D50W) injection syrg 12.5-25 g  25-50 mL IntraVENous PRN    sodium chloride (NS) flush 5-40 mL  5-40 mL IntraVENous Q8H    sodium chloride (NS) flush 5-40 mL  5-40 mL IntraVENous PRN    acetaminophen (TYLENOL) tablet 650 mg  650 mg Oral Q6H PRN    Or    acetaminophen (TYLENOL) suppository 650 mg  650 mg Rectal Q6H PRN    polyethylene glycol (MIRALAX) packet 17 g  17 g Oral DAILY    promethazine (PHENERGAN) tablet 12.5 mg  12.5 mg Oral Q6H PRN    Or    ondansetron (ZOFRAN) injection 4 mg  4 mg IntraVENous Q6H PRN    enoxaparin (LOVENOX) injection 40 mg  40 mg SubCUTAneous DAILY    HYDROcodone-acetaminophen (NORCO)  mg tablet 1 Tablet  1 Tablet Oral Q6H PRN    morphine injection 4 mg  4 mg IntraVENous Q4H PRN    metoprolol tartrate (LOPRESSOR) tablet 25 mg  25 mg Oral Q12H       Signed:  Gregory Aguilera MD

## 2021-10-31 PROBLEM — N17.9 AKI (ACUTE KIDNEY INJURY) (HCC): Status: ACTIVE | Noted: 2021-10-31

## 2021-10-31 LAB
ANION GAP SERPL CALC-SCNC: 3 MMOL/L (ref 7–16)
ANION GAP SERPL CALC-SCNC: 6 MMOL/L (ref 7–16)
BUN SERPL-MCNC: 27 MG/DL (ref 8–23)
BUN SERPL-MCNC: 27 MG/DL (ref 8–23)
CALCIUM SERPL-MCNC: 9 MG/DL (ref 8.3–10.4)
CALCIUM SERPL-MCNC: 9.8 MG/DL (ref 8.3–10.4)
CHLORIDE SERPL-SCNC: 102 MMOL/L (ref 98–107)
CHLORIDE SERPL-SCNC: 104 MMOL/L (ref 98–107)
CO2 SERPL-SCNC: 27 MMOL/L (ref 21–32)
CO2 SERPL-SCNC: 32 MMOL/L (ref 21–32)
CREAT SERPL-MCNC: 1.08 MG/DL (ref 0.6–1)
CREAT SERPL-MCNC: 1.45 MG/DL (ref 0.6–1)
GLUCOSE SERPL-MCNC: 102 MG/DL (ref 65–100)
GLUCOSE SERPL-MCNC: 118 MG/DL (ref 65–100)
MAGNESIUM SERPL-MCNC: 2.7 MG/DL (ref 1.8–2.4)
POTASSIUM SERPL-SCNC: 4.4 MMOL/L (ref 3.5–5.1)
POTASSIUM SERPL-SCNC: 5 MMOL/L (ref 3.5–5.1)
SODIUM SERPL-SCNC: 135 MMOL/L (ref 136–145)
SODIUM SERPL-SCNC: 139 MMOL/L (ref 136–145)

## 2021-10-31 PROCEDURE — 74011250636 HC RX REV CODE- 250/636: Performed by: FAMILY MEDICINE

## 2021-10-31 PROCEDURE — 36415 COLL VENOUS BLD VENIPUNCTURE: CPT

## 2021-10-31 PROCEDURE — 80048 BASIC METABOLIC PNL TOTAL CA: CPT

## 2021-10-31 PROCEDURE — 65660000000 HC RM CCU STEPDOWN

## 2021-10-31 PROCEDURE — 97530 THERAPEUTIC ACTIVITIES: CPT

## 2021-10-31 PROCEDURE — 94760 N-INVAS EAR/PLS OXIMETRY 1: CPT

## 2021-10-31 PROCEDURE — 83735 ASSAY OF MAGNESIUM: CPT

## 2021-10-31 PROCEDURE — 74011250637 HC RX REV CODE- 250/637: Performed by: FAMILY MEDICINE

## 2021-10-31 PROCEDURE — 2709999900 HC NON-CHARGEABLE SUPPLY

## 2021-10-31 PROCEDURE — 97161 PT EVAL LOW COMPLEX 20 MIN: CPT

## 2021-10-31 PROCEDURE — 77010033678 HC OXYGEN DAILY

## 2021-10-31 RX ORDER — HYDRALAZINE HYDROCHLORIDE 25 MG/1
25 TABLET, FILM COATED ORAL 3 TIMES DAILY
Status: DISCONTINUED | OUTPATIENT
Start: 2021-10-31 | End: 2021-11-02 | Stop reason: HOSPADM

## 2021-10-31 RX ORDER — SODIUM CHLORIDE 9 MG/ML
75 INJECTION, SOLUTION INTRAVENOUS CONTINUOUS
Status: DISCONTINUED | OUTPATIENT
Start: 2021-10-31 | End: 2021-11-01

## 2021-10-31 RX ADMIN — HYDRALAZINE HYDROCHLORIDE 25 MG: 50 TABLET, FILM COATED ORAL at 15:32

## 2021-10-31 RX ADMIN — SODIUM CHLORIDE 75 ML/HR: 900 INJECTION, SOLUTION INTRAVENOUS at 08:40

## 2021-10-31 RX ADMIN — Medication 10 ML: at 05:12

## 2021-10-31 RX ADMIN — GABAPENTIN 400 MG: 400 CAPSULE ORAL at 14:03

## 2021-10-31 RX ADMIN — Medication 10 MG: at 22:25

## 2021-10-31 RX ADMIN — FLUOXETINE 20 MG: 20 CAPSULE ORAL at 07:12

## 2021-10-31 RX ADMIN — HYDRALAZINE HYDROCHLORIDE 25 MG: 50 TABLET, FILM COATED ORAL at 22:25

## 2021-10-31 RX ADMIN — ALPRAZOLAM 0.25 MG: 0.5 TABLET ORAL at 08:41

## 2021-10-31 RX ADMIN — HYDROCODONE BITARTRATE AND ACETAMINOPHEN 1 TABLET: 10; 325 TABLET ORAL at 05:12

## 2021-10-31 RX ADMIN — HYDROCODONE BITARTRATE AND ACETAMINOPHEN 1 TABLET: 10; 325 TABLET ORAL at 14:06

## 2021-10-31 RX ADMIN — HYDRALAZINE HYDROCHLORIDE 25 MG: 50 TABLET, FILM COATED ORAL at 08:41

## 2021-10-31 RX ADMIN — Medication 10 ML: at 14:00

## 2021-10-31 RX ADMIN — GABAPENTIN 400 MG: 400 CAPSULE ORAL at 08:41

## 2021-10-31 RX ADMIN — BUSPIRONE HYDROCHLORIDE 20 MG: 5 TABLET ORAL at 08:41

## 2021-10-31 RX ADMIN — DONEPEZIL HYDROCHLORIDE 10 MG: 5 TABLET, FILM COATED ORAL at 22:25

## 2021-10-31 RX ADMIN — PRAVASTATIN SODIUM 80 MG: 20 TABLET ORAL at 22:25

## 2021-10-31 RX ADMIN — CELECOXIB 100 MG: 100 CAPSULE ORAL at 08:41

## 2021-10-31 RX ADMIN — CELECOXIB 100 MG: 100 CAPSULE ORAL at 17:54

## 2021-10-31 RX ADMIN — Medication 10 ML: at 22:25

## 2021-10-31 RX ADMIN — FENOFIBRATE 160 MG: 160 TABLET ORAL at 08:41

## 2021-10-31 RX ADMIN — LEVOTHYROXINE SODIUM 75 MCG: 0.07 TABLET ORAL at 07:12

## 2021-10-31 RX ADMIN — MIRTAZAPINE 45 MG: 15 TABLET, FILM COATED ORAL at 22:25

## 2021-10-31 RX ADMIN — METOPROLOL TARTRATE 25 MG: 25 TABLET, FILM COATED ORAL at 21:07

## 2021-10-31 RX ADMIN — ENOXAPARIN SODIUM 40 MG: 40 INJECTION SUBCUTANEOUS at 08:40

## 2021-10-31 RX ADMIN — BUSPIRONE HYDROCHLORIDE 20 MG: 5 TABLET ORAL at 22:25

## 2021-10-31 RX ADMIN — GABAPENTIN 400 MG: 400 CAPSULE ORAL at 17:54

## 2021-10-31 RX ADMIN — SODIUM CHLORIDE 75 ML/HR: 900 INJECTION, SOLUTION INTRAVENOUS at 21:08

## 2021-10-31 RX ADMIN — METOPROLOL TARTRATE 25 MG: 25 TABLET, FILM COATED ORAL at 08:41

## 2021-10-31 RX ADMIN — BUSPIRONE HYDROCHLORIDE 20 MG: 5 TABLET ORAL at 15:32

## 2021-10-31 NOTE — PROGRESS NOTES
Oxygen Check:       10/31/21 1657   Oxygen Therapy   O2 Sat (%) 97 %   Pulse via Oximetry 66 beats per minute   O2 Device Nasal cannula   O2 Flow Rate (L/min) 2 l/min

## 2021-10-31 NOTE — PROGRESS NOTES
Hospitalist Progress Note   Admit Date:  10/29/2021  6:49 PM   Name:  Johnathan Piper   Age:  67 y.o. Sex:  female  :  1945   MRN:  [id number]   Room:  [facility]    Presenting Complaint: Anxiety and Shortness of Breath    Reason(s) for Admission: Acute hypoxemic respiratory failure [facility]) [J96.01]     Hospital Course & Interval History: This patient was discussed with the ER provider prior to seeing the patient. Pertinent history, physical, diagnostics, initial treatments, and further plans reviewed.     Johnathan Piper is a 67 y.o. female with medical history of hypertension, diastolic heart failure, pulmonary hypertension, and shoulder surgery yesterday,,who presented with shortness of breath and anxiety. Patient reports that she was admitted to [facility] yesterday for shoulder surgery, and was discharged today. She does states she was on oxygen at times during her hospital stay. She denies lung disease, or significant cardiac disease. She states she does not have a history of heart failure. She states she had an echocardiogram recently, as far she knows that was normal.  Initially she stated her symptoms started yesterday. However she tells me she has had trouble breathing when lying flat for probably a couple of weeks. She denies lower extremity edema. Her O2 saturations were noted to be as low as 65% on room air. She did respond well to supplemental oxygen initially, but then had some desaturation with exertion. Chest x-ray done in the ER was concerning for pulmonary edema. She was given IV Lasix in the ER and she has had good output. We are asked to admit for further evaluation and management. She denies fever/chills, NVD, abdominal pain, chest pain, cough.   She is fully vaccinated for Covid, but has not had a booster shot    Subjective (10/31/21):  10/30/2021  Says breathing better, presently requiring oxygen at 3 L/min    10/31/2021  Says breathing better  On 2 L/min nasal cannula  Presently in the SANDRA      Assessment & Plan:     -Acute hypoxic respiratory failure secondary to CHF exacerbation  Presently on 3 L of oxygen nasal cannula  Continue Lasix 40 mg IV twice daily  10/31/2021  Lasix held secondary to SANDRA    -SANDRA  Probably secondary to Lasix  Lasix held  Start on IV fluids normal saline at 75 cc/h  Repeat BMP this afternoon    -Hypomagnesemia  Resolved    -Recent right shoulder surgery  Continue pain medication  We will add Motrin  10/31/2021  Pain better    -Hypertension  Continue losartan and metoprolol    -Dementia  Continue Aricept    -Anxiety/depression  Continue BuSpar    -Hypothyroidism  Synthroid 75 mcg daily            Diet:  ADULT ORAL NUTRITION SUPPLEMENT Lunch; Diabetic Supplement  ADULT DIET Regular; 4 carb choices (60 gm/meal);  Low Sodium (2 gm)  DVT PPx: Lovenox  Code status: Full Code    Hospital Problems as of 10/31/2021 Date Reviewed: 4/12/2021        Codes Class Noted - Resolved POA    SANDRA (acute kidney injury) ([facility] ICD-10-CM: N17.9  ICD-9-CM: 584.9  10/31/2021 - Present Unknown        Hypomagnesemia ICD-10-CM: E83.42  ICD-9-CM: 275.2  10/30/2021 - Present Unknown        * (Principal) Acute hypoxemic respiratory failure ([facility] ICD-10-CM: J96.01  ICD-9-CM: 518.81  10/29/2021 - Present Yes        History of shoulder surgery ICD-10-CM: Z98.890  ICD-9-CM: V45.89  10/29/2021 - Present Yes    Overview Signed 10/29/2021  9:20 PM by Kelly Amato MD     Had outpt Rt shoulder surgery on 10/28/21 at [facility]             Late onset Alzheimer dementia [facility]) (Chronic) ICD-10-CM: G30.1, F02.80  ICD-9-CM: 331.0  10/29/2021 - Present Yes        Pulmonary hypertension ([facility] (Chronic) ICD-10-CM: I27.20  ICD-9-CM: 416.8  10/28/2021 - Present Yes        Chronic diastolic CHF (congestive heart failure) (HCC) (Chronic) ICD-10-CM: I50.32  ICD-9-CM: 428.32, 428.0  10/25/2021 - Present Yes        Recurrent major depressive disorder, in partial remission ([facility] (Chronic) ICD-10-CM: F33.41  ICD-9-CM: 296.35  2/25/2018 - Present Yes    Overview Signed 10/29/2021  9:13 PM by Carola Sparks MD     With associated Anxiety             Generalized anxiety disorder ICD-10-CM: F41.1  ICD-9-CM: 300.02  11/17/2016 - Present         Dementia with behavioral disturbance ([facility] ICD-10-CM: F03.91  ICD-9-CM: 294.21  7/16/2016 - Present     Overview Signed 10/29/2021  9:15 PM by Carola Sparks MD     Last Assessment & Plan:   Formatting of this note might be different from the original.  Seems a bit better today. See section under encephalopathy.              Essential hypertension (Chronic) ICD-10-CM: I10  ICD-9-CM: 401.9  1/5/2016 - Present Yes    Overview Signed 10/29/2021  9:15 PM by Carola Sparks MD     Last Assessment & Plan:   Formatting of this note might be different from the original.  Encouraged low sodium diet; Goal: take meds as prescribed, monitor blood pressure at home and call with readings                   Objective:     Patient Vitals for the past 24 hrs:   Temp Pulse Resp BP SpO2   10/31/21 1200  72      10/31/21 1124 97.7 °F (36.5 °C) 76 24 (!) 125/46 93 %   10/31/21 0800  81      10/31/21 0714 98.2 °F (36.8 °C) 75 20 (!) 146/57 94 %   10/31/21 0453 97.6 °F (36.4 °C) 83 24 (!) 146/58 95 %   10/31/21 0452  82 24  95 %   10/31/21 0400  70      10/31/21 0028 97.7 °F (36.5 °C) 68 16 (!) 106/53 94 %   10/31/21 0000  67      10/30/21 2057 97.6 °F (36.4 °C) 68 19 (!) 101/41 95 %   10/30/21 2000  76      10/30/21 1600  72      10/30/21 1525 98.3 °F (36.8 °C) 65 20 (!) 107/42 93 %     Oxygen Therapy  O2 Sat (%): 93 % (10/31/21 1124)  Pulse via Oximetry: 80 beats per minute (10/31/21 0452)  O2 Device: Nasal cannula (10/31/21 0714)  Skin Assessment: Clean, dry, & intact (10/30/21 0300)  Skin Protection for O2 Device: No (10/30/21 0300)  O2 Flow Rate (L/min): 2 l/min (10/31/21 0714)    Estimated body mass index is 31 kg/m² as calculated from the following:    Height as of this encounter: 5' 5\" (1.651 m). Weight as of this encounter: 84.5 kg (186 lb 4.8 oz). Intake/Output Summary (Last 24 hours) at 10/31/2021 1341  Last data filed at 10/31/2021 1129  Gross per 24 hour   Intake    Output 900 ml   Net -900 ml         Physical Exam:     Blood pressure (!) 125/46, pulse 72, temperature 97.7 °F (36.5 °C), resp. rate 24, height 5' 5\" (1.651 m), weight 84.5 kg (186 lb 4.8 oz), SpO2 93 %, not currently breastfeeding. General:    Well nourished. Improving respiratory distress, presently on 2 L/min  Head:  Normocephalic, atraumatic  Eyes:  Sclerae appear normal.  Pupils equally round. ENT:  Nares appear normal, no drainage. Moist oral mucosa  Neck:  No restricted ROM. Trachea midline   CV:   RRR. No m/r/g. No jugular venous distension. Lungs:   Bilateral coarse breath sounds  Abdomen: Bowel sounds present. Soft, nontender, nondistended. Extremities: Right upper extremity, recent shoulder surgery, and sling  Skin:     No rashes and normal coloration. Warm and dry. Neuro:  CN II-XII grossly intact. Sensation intact. A&Ox3  Psych:  Normal mood and affect.       I have reviewed ordered lab tests and independently visualized imaging below:    Recent Labs:  Recent Results (from the past 48 hour(s))   METABOLIC PANEL, BASIC    Collection Time: 10/29/21  7:16 PM   Result Value Ref Range    Sodium 144 136 - 145 mmol/L    Potassium 4.3 3.5 - 5.1 mmol/L    Chloride 109 (H) 98 - 107 mmol/L    CO2 31 21 - 32 mmol/L    Anion gap 4 (L) 7 - 16 mmol/L    Glucose 143 (H) 65 - 100 mg/dL    BUN 11 8 - 23 MG/DL    Creatinine 0.87 0.6 - 1.0 MG/DL    GFR est AA >60 >60 ml/min/1.73m2    GFR est non-AA >60 >60 ml/min/1.73m2    Calcium 8.8 8.3 - 10.4 MG/DL   CBC WITH AUTOMATED DIFF    Collection Time: 10/29/21  7:16 PM   Result Value Ref Range    WBC 12.3 (H) 4.3 - 11.1 K/uL    RBC 3.23 (L) 4.05 - 5.2 M/uL    HGB 10.1 (L) 11.7 - 15.4 g/dL    HCT 32.1 (L) 35.8 - 46.3 % MCV 99.4 (H) 79.6 - 97.8 FL    MCH 31.3 26.1 - 32.9 PG    MCHC 31.5 31.4 - 35.0 g/dL    RDW 13.2 11.9 - 14.6 %    PLATELET 591 973 - 219 K/uL    MPV 11.4 9.4 - 12.3 FL    ABSOLUTE NRBC 0.00 0.0 - 0.2 K/uL    DF AUTOMATED      NEUTROPHILS 89 (H) 43 - 78 %    LYMPHOCYTES 5 (L) 13 - 44 %    MONOCYTES 5 4.0 - 12.0 %    EOSINOPHILS 0 (L) 0.5 - 7.8 %    BASOPHILS 0 0.0 - 2.0 %    IMMATURE GRANULOCYTES 0 0.0 - 5.0 %    ABS. NEUTROPHILS 10.9 (H) 1.7 - 8.2 K/UL    ABS. LYMPHOCYTES 0.7 0.5 - 4.6 K/UL    ABS. MONOCYTES 0.7 0.1 - 1.3 K/UL    ABS. EOSINOPHILS 0.0 0.0 - 0.8 K/UL    ABS. BASOPHILS 0.0 0.0 - 0.2 K/UL    ABS. IMM.  GRANS. 0.1 0.0 - 0.5 K/UL   PROTHROMBIN TIME + INR    Collection Time: 10/29/21  7:16 PM   Result Value Ref Range    Prothrombin time 13.8 12.6 - 14.5 sec    INR 1.0     D DIMER    Collection Time: 10/29/21  7:16 PM   Result Value Ref Range    D DIMER 0.86 (H) <0.56 ug/ml(FEU)   LACTIC ACID    Collection Time: 10/29/21  7:16 PM   Result Value Ref Range    Lactic acid 2.5 (H) 0.4 - 2.0 MMOL/L   TROPONIN-HIGH SENSITIVITY    Collection Time: 10/29/21  7:16 PM   Result Value Ref Range    Troponin-High Sensitivity 18.5 (H) 0 - 14 pg/mL   NT-PRO BNP    Collection Time: 10/29/21  7:16 PM   Result Value Ref Range    NT pro-BNP 1,451 (H) <450 PG/ML   EKG, 12 LEAD, INITIAL    Collection Time: 10/29/21  7:19 PM   Result Value Ref Range    Ventricular Rate 77 BPM    Atrial Rate 77 BPM    P-R Interval 134 ms    QRS Duration 74 ms    Q-T Interval 400 ms    QTC Calculation (Bezet) 452 ms    Calculated P Axis 18 degrees    Calculated R Axis 4 degrees    Calculated T Axis 40 degrees    Diagnosis       Normal sinus rhythm  Normal ECG  When compared with ECG of 16-FEB-2021 13:07,  No significant change was found  Confirmed by Talia Boyle [id number]) on 10/30/2021 6:45:01 AM     POC VENOUS BLOOD GAS    Collection Time: 10/29/21  7:50 PM   Result Value Ref Range    Device: RIGHT RADIAL      FIO2 (POC) 21 %    pH, venous (POC) 7.39 7.32 - 7.42      pCO2, venous (POC) 47.2 41 - 51 MMHG    pO2, venous (POC) 40 mmHg    HCO3, venous (POC) 28.5 (H) 23 - 28 MMOL/L    sO2, venous (POC) 73.7 65 - 88 %    Base excess, venous (POC) 3.0 mmol/L    Allens test (POC) Positive      Site LEFT RADIAL      Specimen type (POC) VENOUS BLOOD      Performed by Mary Ann    TROPONIN-HIGH SENSITIVITY    Collection Time: 10/29/21  9:21 PM   Result Value Ref Range    Troponin-High Sensitivity 20.2 (H) 0 - 14 pg/mL   LACTIC ACID    Collection Time: 10/29/21  9:47 PM   Result Value Ref Range    Lactic acid 1.9 0.4 - 2.0 MMOL/L   METABOLIC PANEL, BASIC    Collection Time: 10/29/21 11:23 PM   Result Value Ref Range    Sodium 142 136 - 145 mmol/L    Potassium 3.7 3.5 - 5.1 mmol/L    Chloride 107 98 - 107 mmol/L    CO2 28 21 - 32 mmol/L    Anion gap 7 7 - 16 mmol/L    Glucose 160 (H) 65 - 100 mg/dL    BUN 11 8 - 23 MG/DL    Creatinine 0.86 0.6 - 1.0 MG/DL    GFR est AA >60 >60 ml/min/1.73m2    GFR est non-AA >60 >60 ml/min/1.73m2    Calcium 9.1 8.3 - 10.4 MG/DL   MAGNESIUM    Collection Time: 10/29/21 11:23 PM   Result Value Ref Range    Magnesium 1.7 (L) 1.8 - 2.4 mg/dL   HEMOGLOBIN A1C WITH EAG    Collection Time: 10/30/21  3:09 AM   Result Value Ref Range    Hemoglobin A1c 5.4 4.2 - 6.3 %    Est. average glucose 479 mg/dL   METABOLIC PANEL, BASIC    Collection Time: 10/30/21  3:09 AM   Result Value Ref Range    Sodium 143 136 - 145 mmol/L    Potassium 4.4 3.5 - 5.1 mmol/L    Chloride 109 (H) 98 - 107 mmol/L    CO2 30 21 - 32 mmol/L    Anion gap 4 (L) 7 - 16 mmol/L    Glucose 105 (H) 65 - 100 mg/dL    BUN 10 8 - 23 MG/DL    Creatinine 0.75 0.6 - 1.0 MG/DL    GFR est AA >60 >60 ml/min/1.73m2    GFR est non-AA >60 >60 ml/min/1.73m2    Calcium 8.0 (L) 8.3 - 10.4 MG/DL   MAGNESIUM    Collection Time: 10/30/21  3:09 AM   Result Value Ref Range    Magnesium 1.7 (L) 1.8 - 2.4 mg/dL   TROPONIN-HIGH SENSITIVITY    Collection Time: 10/30/21  3:09 AM   Result Value Ref Range    Troponin-High Sensitivity 16.8 (H) 0 - 14 pg/mL   NT-PRO BNP    Collection Time: 10/30/21  3:09 AM   Result Value Ref Range    NT pro-BNP 1,502 (H) <450 PG/ML   GLUCOSE, POC    Collection Time: 10/30/21  7:32 AM   Result Value Ref Range    Glucose (POC) 129 (H) 65 - 100 mg/dL    [facility]    GLUCOSE, POC    Collection Time: 10/30/21 11:28 AM   Result Value Ref Range    Glucose (POC) 147 (H) 65 - 100 mg/dL    Performed by [facility]    METABOLIC PANEL, BASIC    Collection Time: 10/31/21  3:56 AM   Result Value Ref Range    Sodium 139 136 - 145 mmol/L    Potassium 5.0 3.5 - 5.1 mmol/L    Chloride 104 98 - 107 mmol/L    CO2 32 21 - 32 mmol/L    Anion gap 3 (L) 7 - 16 mmol/L    Glucose 102 (H) 65 - 100 mg/dL    BUN 27 (H) 8 - 23 MG/DL    Creatinine 1.45 (H) 0.6 - 1.0 MG/DL    GFR est AA 45 (L) >60 ml/min/1.73m2    GFR est non-AA 37 (L) >60 ml/min/1.73m2    Calcium 9.8 8.3 - 10.4 MG/DL   MAGNESIUM    Collection Time: 10/31/21  3:56 AM   Result Value Ref Range    Magnesium 2.7 (H) 1.8 - 2.4 mg/dL       All Micro Results     None          Other Studies:  No results found.     Current Meds:  Current Facility-Administered Medications   Medication Dose Route Frequency    0.9% sodium chloride infusion  75 mL/hr IntraVENous CONTINUOUS    hydrALAZINE (APRESOLINE) tablet 25 mg  25 mg Oral TID    magnesium oxide (MAG-OX) tablet 400 mg  400 mg Oral BID    potassium chloride (K-DUR, KLOR-CON) SR tablet 40 mEq  40 mEq Oral BID    ibuprofen (MOTRIN) tablet 400 mg  400 mg Oral Q6H PRN    [Held by provider] furosemide (LASIX) injection 40 mg  40 mg IntraVENous BID    ALPRAZolam (XANAX) tablet 0.25 mg  0.25 mg Oral DAILY PRN    busPIRone (BUSPAR) tablet 20 mg  20 mg Oral TID    celecoxib (CELEBREX) capsule 100 mg  100 mg Oral BID    donepeziL (ARICEPT) tablet 10 mg  10 mg Oral QHS    fenofibrate (LOFIBRA) tablet 160 mg  160 mg Oral DAILY    FLUoxetine (PROzac) capsule 20 mg  20 mg Oral 7am    gabapentin (NEURONTIN) capsule 400 mg  400 mg Oral QID    levothyroxine (SYNTHROID) tablet 75 mcg  75 mcg Oral ACB    [Held by provider] losartan (COZAAR) tablet 25 mg  25 mg Oral DAILY    melatonin tablet 10 mg  10 mg Oral QHS    mirtazapine (REMERON) tablet 45 mg  45 mg Oral QHS    pravastatin (PRAVACHOL) tablet 80 mg  80 mg Oral QHS    dextrose 40% (GLUTOSE) oral gel 1 Tube  15 g Oral PRN    glucagon (GLUCAGEN) injection 1 mg  1 mg IntraMUSCular PRN    dextrose (D50W) injection syrg 12.5-25 g  25-50 mL IntraVENous PRN    sodium chloride (NS) flush 5-40 mL  5-40 mL IntraVENous Q8H    sodium chloride (NS) flush 5-40 mL  5-40 mL IntraVENous PRN    acetaminophen (TYLENOL) tablet 650 mg  650 mg Oral Q6H PRN    Or    acetaminophen (TYLENOL) suppository 650 mg  650 mg Rectal Q6H PRN    polyethylene glycol (MIRALAX) packet 17 g  17 g Oral DAILY    promethazine (PHENERGAN) tablet 12.5 mg  12.5 mg Oral Q6H PRN    Or    ondansetron (ZOFRAN) injection 4 mg  4 mg IntraVENous Q6H PRN    enoxaparin (LOVENOX) injection 40 mg  40 mg SubCUTAneous DAILY    HYDROcodone-acetaminophen (NORCO)  mg tablet 1 Tablet  1 Tablet Oral Q6H PRN    morphine injection 4 mg  4 mg IntraVENous Q4H PRN    metoprolol tartrate (LOPRESSOR) tablet 25 mg  25 mg Oral Q12H       Signed:  Saji Session, MD

## 2021-10-31 NOTE — PROGRESS NOTES
Problem: Mobility Impaired (Adult and Pediatric)  Goal: *Acute Goals and Plan of Care (Insert Text)  Outcome: Progressing Towards Goal  Note: STG:  (1.)Ms. Stevens will move from supine to sit and sit to supine  with STAND BY ASSIST within 4-7 treatment day(s). (2.)Ms. Stevens will transfer from bed to chair and chair to bed with STAND BY ASSIST using the least restrictive device within 4-7 treatment day(s). (3.)Ms. Stevens will ambulate with STAND BY ASSIST/CONTACT GUARD ASSIST for 100 feet with the least restrictive device within 4-7 treatment day(s). ________________________________________________________________________________________________      PHYSICAL THERAPY: Initial Assessment and PM 10/31/2021  INPATIENT: PT Visit Days : 1  [place] / [place] / Product Type: Medicare /       NAME/AGE/GENDER: Scott Hamilton is a 67 y.o. female   PRIMARY DIAGNOSIS: Acute hypoxemic respiratory failure ([facility] [J96.01] Acute hypoxemic respiratory failure ([facility] Acute hypoxemic respiratory failure (HCC)        ICD-10: Treatment Diagnosis:    Generalized Muscle Weakness (M62.81)  Difficulty in walking, Not elsewhere classified (R26.2)   Precaution/Allergies:  Morphine, Oxycodone, and Oxycodone-acetaminophen      ASSESSMENT:     Ms. Pierre Alex was admitted to the hospital with above diagnosis. She had right shoulder surgery at [facility] and is currently in her sling. Unsure of the procedure she had, they were sending home health for follow up. She is right handed and now having acute respiratory failure. She is in our ICU. She lives at home alone and has a son that is supportive. She presents with generalized weakness and decreased independence with functional mobility. She will benefit from skilled PT interventions to maximize independence with mobility.  Depending on how long she stays here we may need to reach out to [facility] and see what her shoulder protocol is or get a handle on what the pt remembers. She admitted she was having a really bad day today. Today we worked on bed mobility, sit to stand, walked in room and standing balance at sink so she could brush her teeth. Helped her return supine with needs in reach. Did make an ice pack for her right shoulder. This section established at most recent assessment   PROBLEM LIST (Impairments causing functional limitations):  Decreased Strength  Decreased ADL/Functional Activities  Decreased Transfer Abilities  Decreased Ambulation Ability/Technique  Decreased Balance  Increased Pain  Decreased Activity Tolerance   INTERVENTIONS PLANNED: (Benefits and precautions of physical therapy have been discussed with the patient.)  Balance Exercise  Bed Mobility  Gait Training  Therapeutic Activites  Therapeutic Exercise/Strengthening  Transfer Training     TREATMENT PLAN: Frequency/Duration: daily for duration of hospital stay  Rehabilitation Potential For Stated Goals: Good     REHAB RECOMMENDATIONS (at time of discharge pending progress):    Placement: It is my opinion, based on this patient's performance to date, that Ms. Stevens may benefit from [address] after discharge due to the functional deficits listed above that are likely to improve with skilled rehabilitation because he/she has multiple medical issues that affect his/her functional mobility in the community. Equipment:   None at this time              HISTORY:   History of Present Injury/Illness (Reason for Referral):  Copied from MD note:   Johnathan Piper is a 67 y.o. female with medical history of hypertension, diastolic heart failure, pulmonary hypertension, and shoulder surgery yesterday,,who presented with shortness of breath and anxiety. Patient reports that she was admitted to [facility] yesterday for shoulder surgery, and was discharged today. She does states she was on oxygen at times during her hospital stay.   She denies lung disease, or significant cardiac disease. She states she does not have a history of heart failure. She states she had an echocardiogram recently, as far she knows that was normal.  Initially she stated her symptoms started yesterday. However she tells me she has had trouble breathing when lying flat for probably a couple of weeks. She denies lower extremity edema. Her O2 saturations were noted to be as low as 65% on room air. She did respond well to supplemental oxygen initially, but then had some desaturation with exertion. Chest x-ray done in the ER was concerning for pulmonary edema. She was given IV Lasix in the ER and she has had good output. We are asked to admit for further evaluation and management. She denies fever/chills, NVD, abdominal pain, chest pain, cough. She is fully vaccinated for Covid, but has not had a booster shot. Past Medical History/Comorbidities:   Ms. Halley Hale  has a past medical history of Adjustment disorder with anxious mood (10/3/2017), Arthritis, Atrial fibrillation ([address] (2016), BMI 31.0-31.9,adult, Cataract of both eyes, CHF (congestive heart failure) ([address] Chronic pain, Dementia ([address] Generalized anxiety disorder (11/17/2016), GERD (gastroesophageal reflux disease), High blood pressure, High cholesterol, Insomnia secondary to anxiety (11/17/2016), Irregular heart beat, Left knee pain, Major depressive disorder, recurrent ([address] (11/17/2016), Neuropathy, Osteoarthritis of left hip (3/11/2021), Overweight (BMI 25.0-29.9) (2/25/2018), Poor historian, Prediabetes, Primary insomnia (11/17/2016), PUD (peptic ulcer disease), Thyroid disease, Unresolved grief (6/22/2017), and Vitamin B 12 deficiency. Ms. Halley Hale  has a past surgical history that includes hx hysterectomy; hx lap cholecystectomy; hx knee replacement (Right); hx knee replacement (Left); hx back surgery; pelvis/hip joint surgery unlisted (Left, 03/11/2021); and hx hip replacement.   Social History/Living Environment:   Home Environment: Private residence  # Steps to Enter: 4  Rails to Enter: Yes  [employer] : Right  One/Two Story Residence: One story  Living Alone: Yes  Support Systems: Child(clari)  Patient Expects to be Discharged to[de-identified] House  Current DME Used/Available at Home: Cane, straight, Shower chair, Walker, rolling  Tub or Shower Type: Tub/Shower combination  Prior Level of Function/Work/Activity:  Pt was living at home, recent right shoulder surgery, son supportive  Dominant Side:         RIGHT   Number of Personal Factors/Comorbidities that affect the Plan of Care: 1-2: MODERATE COMPLEXITY   EXAMINATION:   Most Recent Physical Functioning:   Gross Assessment:  AROM: Within functional limits (except R upper extremity s/p shoulder sx 10/28, in sling)  Strength: Within functional limits (except R upper extremity s/p shoulder sx 10/28, in sling)               Posture:  Posture (WDL): Exceptions to WDL  Posture Assessment: Forward head, Rounded shoulders  Balance:  Sitting: Intact  Standing: Intact; With support Bed Mobility:  Supine to Sit: Contact guard assistance; Additional time  Sit to Supine: Contact guard assistance; Additional time  Scooting: Contact guard assistance  Wheelchair Mobility:     Transfers:  Sit to Stand: Contact guard assistance  Stand to Sit: Contact guard assistance  Gait:     Speed/Ekaterina: Pace decreased (<100 feet/min); Shuffled  Step Length: Left shortened;Right shortened  Gait Abnormalities: Decreased step clearance  Distance (ft): 10 Feet (ft) (and another 10 feet)  Assistive Device:  (hand held assist)  Ambulation - Level of Assistance: Contact guard assistance;Minimal assistance      Body Structures Involved:  Muscles Body Functions Affected: Movement Related Activities and Participation Affected:   Mobility  Self Care   Number of elements that affect the Plan of Care: 4+: HIGH COMPLEXITY   CLINICAL PRESENTATION:   Presentation: Stable and uncomplicated: LOW COMPLEXITY   CLINICAL DECISION MAKING: [address] [facility] Mobility Inpatient Short Form  How much difficulty does the patient currently have. .. Unable A Lot A Little None   1. Turning over in bed (including adjusting bedclothes, sheets and blankets)? [] 1   [] 2   [x] 3   [] 4   2. Sitting down on and standing up from a chair with arms ( e.g., wheelchair, bedside commode, etc.)   [] 1   [] 2   [x] 3   [] 4   3. Moving from lying on back to sitting on the side of the bed? [] 1   [] 2   [x] 3   [] 4   How much help from another person does the patient currently need. .. Total A Lot A Little None   4. Moving to and from a bed to a chair (including a wheelchair)? [] 1   [] 2   [x] 3   [] 4   5. Need to walk in hospital room? [] 1   [] 2   [x] 3   [] 4   6. Climbing 3-5 steps with a railing? [] 1   [] 2   [x] 3   [] 4   © 2007, Trustees of [address] [place] [address] [address] [address], under license to [employer]. All rights reserved      Score:  Initial: 18 Most Recent: X (Date: -- )    Interpretation of Tool:  Represents activities that are increasingly more difficult (i.e. Bed mobility, Transfers, Gait). Medical Necessity:     Patient is expected to demonstrate progress in   strength and functional technique   to   decrease assistance required with functional mobility  . Reason for Services/Other Comments:  Patient continues to require skilled intervention due to   Inability to complete functional mobility independently  .    Use of outcome tool(s) and clinical judgement create a POC that gives a: Clear prediction of patient's progress: LOW COMPLEXITY            TREATMENT:   (In addition to Assessment/Re-Assessment sessions the following treatments were rendered)   Pre-treatment Symptoms/Complaints:  none  Pain: Initial:   Pain Intervention(s) 1: Cold pack  Post Session:  ice on shoulder     Therapeutic Activity: (    8):  Therapeutic activities including Bed transfers, Ambulation on level ground, and standing balance at the sink to improve mobility and strength. Required minimal verbal cues  to promote static and dynamic balance in standing. Assessment    Braces/Orthotics/Lines/Etc:   IV  Telemetry monitoring  O2 Device: Nasal cannula  Treatment/Session Assessment:    Response to Treatment:  pt admits she is having a bad day  Interdisciplinary Collaboration:   Registered Nurse  After treatment position/precautions:   Supine in bed  Bed/Chair-wheels locked  Bed in low position  Call light within reach  RN notified   Compliance with Program/Exercises: Will assess as treatment progresses  Recommendations/Intent for next treatment session: \"Next visit will focus on advancements to more challenging activities and reduction in assistance provided\".   Total Treatment Duration:  PT Patient Time In/Time Out  Time In: 1510  Time Out: [address] [facility] [address], PT

## 2021-11-01 ENCOUNTER — APPOINTMENT (OUTPATIENT)
Dept: NON INVASIVE DIAGNOSTICS | Age: 76
DRG: 291 | End: 2021-11-01
Attending: FAMILY MEDICINE
Payer: MEDICARE

## 2021-11-01 LAB
ANION GAP SERPL CALC-SCNC: 2 MMOL/L (ref 7–16)
BUN SERPL-MCNC: 26 MG/DL (ref 8–23)
CALCIUM SERPL-MCNC: 9.2 MG/DL (ref 8.3–10.4)
CHLORIDE SERPL-SCNC: 107 MMOL/L (ref 98–107)
CO2 SERPL-SCNC: 31 MMOL/L (ref 21–32)
CREAT SERPL-MCNC: 0.74 MG/DL (ref 0.6–1)
ECHO AV AREA PEAK VELOCITY: 2.14 CM2
ECHO AV AREA VTI: 2.23 CM2
ECHO AV AREA/BSA PEAK VELOCITY: 1.1 CM2/M2
ECHO AV AREA/BSA VTI: 1.2 CM2/M2
ECHO AV CUSP MM: 2 CM
ECHO AV MEAN GRADIENT: 5 MMHG
ECHO AV PEAK GRADIENT: 11 MMHG
ECHO AV PEAK VELOCITY: 166 CM/S
ECHO AV VTI: 36.4 CM
ECHO EST RA PRESSURE: 3 MMHG
ECHO LA AREA 2C: 23.2 CM2
ECHO LA AREA 4C: 20.1 CM2
ECHO LA MAJOR AXIS: 6.5 CM
ECHO LA MINOR AXIS: 3.39 CM
ECHO LV E' LATERAL VELOCITY: 7.41 CM/S
ECHO LV E' SEPTAL VELOCITY: 6.43 CM/S
ECHO LV EDV A4C: 27 CM3
ECHO LV ESV A4C: 12 CM3
ECHO LV INTERNAL DIMENSION DIASTOLIC: 4.7 CM (ref 3.9–5.3)
ECHO LV INTERNAL DIMENSION SYSTOLIC: 3.28 CM
ECHO LV IVSD: 1.13 CM (ref 0.6–0.9)
ECHO LV MASS 2D: 191.1 G (ref 67–162)
ECHO LV MASS INDEX 2D: 99.5 G/M2 (ref 43–95)
ECHO LV POSTERIOR WALL DIASTOLIC: 1.1 CM (ref 0.6–0.9)
ECHO LVOT DIAM: 2 CM
ECHO LVOT PEAK GRADIENT: 5 MMHG
ECHO LVOT VTI: 25.9 CM
ECHO MV A VELOCITY: 101 CM/S
ECHO MV E DECELERATION TIME (DT): 246 MS
ECHO MV E VELOCITY: 102 CM/S
ECHO MV E/A RATIO: 1.01
ECHO MV E/E' LATERAL: 13.77
ECHO MV E/E' RATIO (AVERAGED): 14.81
ECHO MV E/E' SEPTAL: 15.86
ECHO MV MEAN GRADIENT: 2 MMHG
ECHO MV VTI: 33.8 CM
ECHO RIGHT VENTRICULAR SYSTOLIC PRESSURE (RVSP): 42 MMHG
ECHO RV INTERNAL DIMENSION: 2.4 CM
ECHO RV TAPSE: 2.8 CM (ref 1.5–2)
ECHO TV REGURGITANT MAX VELOCITY: 292 CM/S
ECHO TV REGURGITANT PEAK GRADIENT: 34 MMHG
GLUCOSE SERPL-MCNC: 106 MG/DL (ref 65–100)
POTASSIUM SERPL-SCNC: 4.4 MMOL/L (ref 3.5–5.1)
SARS-COV-2, COV2: NORMAL
SODIUM SERPL-SCNC: 140 MMOL/L (ref 136–145)

## 2021-11-01 PROCEDURE — 74011000250 HC RX REV CODE- 250: Performed by: FAMILY MEDICINE

## 2021-11-01 PROCEDURE — 77030027138 HC INCENT SPIROMETER -A

## 2021-11-01 PROCEDURE — 74011250636 HC RX REV CODE- 250/636: Performed by: FAMILY MEDICINE

## 2021-11-01 PROCEDURE — 94760 N-INVAS EAR/PLS OXIMETRY 1: CPT

## 2021-11-01 PROCEDURE — 65660000000 HC RM CCU STEPDOWN

## 2021-11-01 PROCEDURE — 80048 BASIC METABOLIC PNL TOTAL CA: CPT

## 2021-11-01 PROCEDURE — U0005 INFEC AGEN DETEC AMPLI PROBE: HCPCS

## 2021-11-01 PROCEDURE — 2709999900 HC NON-CHARGEABLE SUPPLY

## 2021-11-01 PROCEDURE — 93306 TTE W/DOPPLER COMPLETE: CPT

## 2021-11-01 PROCEDURE — 97530 THERAPEUTIC ACTIVITIES: CPT

## 2021-11-01 PROCEDURE — 36415 COLL VENOUS BLD VENIPUNCTURE: CPT

## 2021-11-01 PROCEDURE — 74011250637 HC RX REV CODE- 250/637: Performed by: FAMILY MEDICINE

## 2021-11-01 PROCEDURE — 86580 TB INTRADERMAL TEST: CPT | Performed by: FAMILY MEDICINE

## 2021-11-01 RX ORDER — FUROSEMIDE 10 MG/ML
20 INJECTION INTRAMUSCULAR; INTRAVENOUS ONCE
Status: COMPLETED | OUTPATIENT
Start: 2021-11-01 | End: 2021-11-01

## 2021-11-01 RX ADMIN — LEVOTHYROXINE SODIUM 75 MCG: 0.07 TABLET ORAL at 06:45

## 2021-11-01 RX ADMIN — GABAPENTIN 400 MG: 400 CAPSULE ORAL at 21:08

## 2021-11-01 RX ADMIN — Medication 400 MG: at 08:58

## 2021-11-01 RX ADMIN — PRAVASTATIN SODIUM 80 MG: 20 TABLET ORAL at 21:09

## 2021-11-01 RX ADMIN — HYDROCODONE BITARTRATE AND ACETAMINOPHEN 1 TABLET: 10; 325 TABLET ORAL at 18:21

## 2021-11-01 RX ADMIN — FUROSEMIDE 20 MG: 10 INJECTION, SOLUTION INTRAMUSCULAR; INTRAVENOUS at 11:34

## 2021-11-01 RX ADMIN — ALPRAZOLAM 0.25 MG: 0.5 TABLET ORAL at 17:30

## 2021-11-01 RX ADMIN — CELECOXIB 100 MG: 100 CAPSULE ORAL at 16:45

## 2021-11-01 RX ADMIN — BUSPIRONE HYDROCHLORIDE 20 MG: 5 TABLET ORAL at 08:56

## 2021-11-01 RX ADMIN — CELECOXIB 100 MG: 100 CAPSULE ORAL at 08:58

## 2021-11-01 RX ADMIN — TUBERCULIN PURIFIED PROTEIN DERIVATIVE 5 UNITS: 5 INJECTION, SOLUTION INTRADERMAL at 12:26

## 2021-11-01 RX ADMIN — METOPROLOL TARTRATE 25 MG: 25 TABLET, FILM COATED ORAL at 08:59

## 2021-11-01 RX ADMIN — POLYETHYLENE GLYCOL 3350 17 G: 17 POWDER, FOR SOLUTION ORAL at 08:59

## 2021-11-01 RX ADMIN — GABAPENTIN 400 MG: 400 CAPSULE ORAL at 08:56

## 2021-11-01 RX ADMIN — FENOFIBRATE 160 MG: 160 TABLET ORAL at 08:59

## 2021-11-01 RX ADMIN — Medication 10 MG: at 21:09

## 2021-11-01 RX ADMIN — HYDRALAZINE HYDROCHLORIDE 25 MG: 50 TABLET, FILM COATED ORAL at 08:56

## 2021-11-01 RX ADMIN — DONEPEZIL HYDROCHLORIDE 10 MG: 5 TABLET, FILM COATED ORAL at 21:09

## 2021-11-01 RX ADMIN — BUSPIRONE HYDROCHLORIDE 5 MG: 5 TABLET ORAL at 16:45

## 2021-11-01 RX ADMIN — GABAPENTIN 400 MG: 400 CAPSULE ORAL at 12:26

## 2021-11-01 RX ADMIN — MIRTAZAPINE 45 MG: 15 TABLET, FILM COATED ORAL at 21:08

## 2021-11-01 RX ADMIN — HYDROCODONE BITARTRATE AND ACETAMINOPHEN 1 TABLET: 10; 325 TABLET ORAL at 12:25

## 2021-11-01 RX ADMIN — FLUOXETINE 20 MG: 20 CAPSULE ORAL at 06:45

## 2021-11-01 RX ADMIN — Medication 400 MG: at 16:45

## 2021-11-01 RX ADMIN — GABAPENTIN 400 MG: 400 CAPSULE ORAL at 17:30

## 2021-11-01 RX ADMIN — HYDRALAZINE HYDROCHLORIDE 25 MG: 50 TABLET, FILM COATED ORAL at 16:45

## 2021-11-01 RX ADMIN — BUSPIRONE HYDROCHLORIDE 20 MG: 5 TABLET ORAL at 21:09

## 2021-11-01 RX ADMIN — ENOXAPARIN SODIUM 40 MG: 40 INJECTION SUBCUTANEOUS at 08:59

## 2021-11-01 NOTE — PROGRESS NOTES
TRANSFER - OUT REPORT:    Verbal report given to China Torres on [facility]  being transferred to [facility] for routine progression of care       Report consisted of patients Situation, Background, Assessment and   Recommendations(SBAR). Information from the following report(s) SBAR, Kardex, Intake/Output, MAR and Cardiac Rhythm sinnus rhythm was reviewed with the receiving nurse. Lines:   Peripheral IV 10/29/21 Left Antecubital (Active)   Site Assessment Clean, dry, & intact 10/31/21 1932   Phlebitis Assessment 0 10/31/21 1932   Infiltration Assessment 0 10/31/21 1932   Dressing Status Clean, dry, & intact 10/31/21 1932   Dressing Type Tape;Transparent 10/31/21 1932   Hub Color/Line Status Pink;Flushed;Capped 10/31/21 1932   Action Taken Other (comment) 10/30/21 0000   Alcohol Cap Used No 10/31/21 1932       Peripheral IV 10/30/21 Left Hand (Active)   Site Assessment Clean, dry, & intact 10/31/21 1932   Phlebitis Assessment 0 10/31/21 1932   Infiltration Assessment 0 10/31/21 1932   Dressing Status Clean, dry, & intact 10/31/21 1932   Dressing Type Tape;Transparent 10/31/21 1932   Hub Color/Line Status Blue;Flushed; Infusing 10/31/21 1932   Action Taken Blood drawn 10/30/21 0000   Alcohol Cap Used No 10/31/21 1932        Opportunity for questions and clarification was provided.       Patient transported with:   O2 @ 2 liters  Tech

## 2021-11-01 NOTE — PROGRESS NOTES
During rounding/vital signs patient stated that she needed to get ready for her grandson's nephew. Explained to patient that it's late and that she is not missing a wedding right now. Patient asked to speak to daughter. Called patient's daughter Morro Loera to reassure her that she is in the hospital and that she is not missing a wedding. Reoriented. Vital signs within normal range. Frequent rounding intended. Bed alarm in place, door ope.

## 2021-11-01 NOTE — PROGRESS NOTES
Physician Progress Note      Gaurav Hernandez  CSN #:                  [id number]  :                       1945  ADMIT DATE:       10/29/2021 6:49 PM  [address] [place] DATE:  RESPONDING  PROVIDER #:        Denver Richer MD Ever Buerger MD          QUERY TEXT:    Pt admitted with acute respiratory failure. Pt noted to have had surgical procedure on 10/28 at [facility].   If possible, please document in progress notes and discharge summary:    The medical record reflects the following:  Risk Factors: pulmonary edema/fluid overload, recent surgical procedure  Clinical Indicators: H&P states \"Appears to be due to pulmonary edema/fluid overload, possibly postoperative\"  Treatment: IV Lasix, serial labs, CXR, supplemental O2  Options provided:  -- Acute hypoxic respiratory failure is a postoperative complication  -- Acute hypoxic respiratory failure is an expected/inherent condition that occurred postoperatively and not a complication  -- Acute hypoxic respiratory failure is not a postoperative complication, but is due to other incidental risk factor, Please specify other incidental risk factor  -- Other - I will add my own diagnosis  -- Disagree - Not applicable / Not valid  -- Disagree - Clinically unable to determine / Unknown  -- Refer to Clinical Documentation Reviewer    PROVIDER RESPONSE TEXT:    Acute hypoxic respiratory failure is not a postoperative complication but due to diastolic chf    Query created by: Jeannine Calderon on 2021 2:34 PM      Electronically signed by:  Denver Richer MD Ever Buerger MD 2021 3:11 PM

## 2021-11-01 NOTE — PROGRESS NOTES
Care Management Interventions  PCP Verified by CM: Yes  Mode of Transport at Discharge: Self  Transition of Care Consult (CM Consult): [address]: No  Reason Outside [place]: Patient already serviced by other home [phone]: Child(clari)  Confirm Follow Up Transport: Family  The Plan for Transition of Care is Related to the Following Treatment Goals : PT/OT/RN  The Patient and/or Patient Representative was Provided with a Choice of Provider and Agrees with the Discharge Plan?: Yes  Name of the Patient Representative Who was Provided with a Choice of Provider and Agrees with the Discharge Plan: Rishabh Natalie  Freedom of Choice List was Provided with Basic Dialogue that Supports the Patient's Individualized Plan of Care/Goals, Treatment Preferences and Shares the Quality Data Associated with the Providers?: Yes  Discharge Location  Discharge Placement: Home with home health    Patient requesting short term rehab but hopes to go for only a short time. She is motivated to return home. Area NH list given for review. She requested a pvt room in the [facility] so [employer] was requested.  Referral sent to ck on bed availability

## 2021-11-01 NOTE — PROGRESS NOTES
Admission assessment complete. Patient transferred from ICU in wheelchair. Arrived alert and oriented, and is now in bed. Respirations are even and unlabored, patient is on 2 liters of oxygen via nasal cannula. Patient is on Telemetry, box no. [id number] and is normal sinus rhythm per monitor room. Bowel sounds are present. Pulse and sensation strong in all four extremities. Patient has right arm in sling, and shoulder is dressed with Aquacel dressing. Dressing is clean, dry and intact. Patient is able to  with moderate strength. No neurovascular deficit noted. Patient has pure wick suctioning. Oriented patient to room and call light functions. Bed low and locked, call light within reach. No needs expressed at this time. ______________________________________________     10/31/21 2200   Dual Skin Pressure Injury Assessment   Dual Skin Pressure Injury Assessment WDL   Second Care Provider (Based on [address] [place] [address]) Esther La RN   Skin Integumentary   Skin Integumentary (WDL) X    Pressure  Injury Documentation No Pressure Injury Noted-Pressure Ulcer Prevention Initiated   Skin Integrity Incision (comment)  (right shoulder)     _______________________________________________    Problem: Pressure Injury - Risk of  Goal: *Prevention of pressure injury  Description: Document Nilson Scale and appropriate interventions in the flowsheet. Outcome: Progressing Towards Goal  Note: Pressure Injury Interventions:  Sensory Interventions: Assess changes in LOC, Avoid rigorous massage over bony prominences, Check visual cues for pain, Discuss PT/OT consult with provider, Monitor skin under medical devices, Turn and reposition approx.  every two hours (pillows and wedges if needed), Minimize linen layers, Keep linens dry and wrinkle-free    Moisture Interventions: Internal/External urinary devices    Activity Interventions: Pressure redistribution bed/mattress(bed type)    Mobility Interventions: Pressure redistribution bed/mattress (bed type)    Nutrition Interventions: Offer support with meals,snacks and hydration    Friction and Shear Interventions: Lift sheet      Problem: Falls - Risk of  Goal: *Absence of Falls  Description: Document Ursula Fall Risk and appropriate interventions in the flowsheet.   Outcome: Progressing Towards Goal  Note: Fall Risk Interventions:  Mobility Interventions: Communicate number of staff needed for ambulation/transfer      Medication Interventions: Patient to call before getting OOB, Teach patient to arise slowly    Elimination Interventions: Call light in reach, Elevated toilet seat, Patient to call for help with toileting needs, Stay With Me (per policy), Toilet paper/wipes in reach, Toileting schedule/hourly rounds

## 2021-11-01 NOTE — PROGRESS NOTES
Hospitalist Progress Note   Admit Date:  10/29/2021  6:49 PM   Name:  Osbaldo Hdez   Age:  67 y.o. Sex:  female  :  1945   MRN:  [id number]   Room:  Pending sale to [facility]/    Presenting Complaint: Anxiety and Shortness of Breath    Reason(s) for Admission: Acute hypoxemic respiratory failure [facility]) [J96.01]     Hospital Course & Interval History: This patient was discussed with the ER provider prior to seeing the patient. Pertinent history, physical, diagnostics, initial treatments, and further plans reviewed.     Osbaldo Hdez is a 67 y.o. female with medical history of hypertension, diastolic heart failure, pulmonary hypertension, and shoulder surgery yesterday,,who presented with shortness of breath and anxiety. Patient reports that she was admitted to [facility] yesterday for shoulder surgery, and was discharged today. She does states she was on oxygen at times during her hospital stay. She denies lung disease, or significant cardiac disease. She states she does not have a history of heart failure. She states she had an echocardiogram recently, as far she knows that was normal.  Initially she stated her symptoms started yesterday. However she tells me she has had trouble breathing when lying flat for probably a couple of weeks. She denies lower extremity edema. Her O2 saturations were noted to be as low as 65% on room air. She did respond well to supplemental oxygen initially, but then had some desaturation with exertion. Chest x-ray done in the ER was concerning for pulmonary edema. She was given IV Lasix in the ER and she has had good output. We are asked to admit for further evaluation and management. She denies fever/chills, NVD, abdominal pain, chest pain, cough.   She is fully vaccinated for Covid, but has not had a booster shot    Subjective (21):  10/30/2021  Says breathing better, presently requiring oxygen at 3 L/min    10/31/2021  Says breathing better  On 2 L/min nasal cannula  Presently in the SANDRA      Assessment & Plan:     -Acute hypoxic respiratory failure secondary to CHF exacerbation  Presently on 3 L of oxygen nasal cannula  Continue Lasix 40 mg IV twice daily  10/31/2021  Lasix held secondary to SANDRA  11/1/2021  We will start on a small dose of Lasix 20 mg daily    -SANDRA  Probably secondary to Lasix  Lasix held  Start on IV fluids normal saline at 75 cc/h  Repeat BMP this afternoon  11/1/2021  SANDRA resolved on IV fluids, will discontinue IV fluids    -Hypomagnesemia  Resolved    -Recent right shoulder surgery  Continue pain medication  We will add Motrin  10/31/2021  Pain better    -Hypertension  Continue losartan and metoprolol    -Dementia  Continue Aricept    -Anxiety/depression  Continue BuSpar    -Hypothyroidism  Synthroid 75 mcg daily    -Debility  PT OT consulted        Diet:  ADULT ORAL NUTRITION SUPPLEMENT Lunch; Diabetic Supplement  ADULT DIET Regular; 4 carb choices (60 gm/meal);  Low Sodium (2 gm)  DVT PPx: Lovenox  Code status: Full Code    Hospital Problems as of 11/1/2021 Date Reviewed: 4/12/2021        Codes Class Noted - Resolved POA    SANDRA (acute kidney injury) ([facility] ICD-10-CM: N17.9  ICD-9-CM: 584.9  10/31/2021 - Present Unknown        Hypomagnesemia ICD-10-CM: E83.42  ICD-9-CM: 275.2  10/30/2021 - Present Unknown        * (Principal) Acute hypoxemic respiratory failure ([facility] ICD-10-CM: J96.01  ICD-9-CM: 518.81  10/29/2021 - Present Yes        History of shoulder surgery ICD-10-CM: Z98.890  ICD-9-CM: V45.89  10/29/2021 - Present Yes    Overview Signed 10/29/2021  9:20 PM by Koki Hobbs MD     Had outpt Rt shoulder surgery on 10/28/21 at [facility]             Late onset Alzheimer dementia [facility]) (Chronic) ICD-10-CM: G30.1, F02.80  ICD-9-CM: 331.0  10/29/2021 - Present Yes        Pulmonary hypertension ([facility] (Chronic) ICD-10-CM: I27.20  ICD-9-CM: 416.8  10/28/2021 - Present Yes        Chronic diastolic CHF (congestive heart failure) (HCC) (Chronic) ICD-10-CM: I50.32  ICD-9-CM: 428.32, 428.0  10/25/2021 - Present Yes        Recurrent major depressive disorder, in partial remission ([facility] (Chronic) ICD-10-CM: F33.41  ICD-9-CM: 296.35  2/25/2018 - Present Yes    Overview Signed 10/29/2021  9:13 PM by Gregorio Martinez MD     With associated Anxiety             Generalized anxiety disorder ICD-10-CM: F41.1  ICD-9-CM: 300.02  11/17/2016 - Present         Dementia with behavioral disturbance ([facility] ICD-10-CM: F03.91  ICD-9-CM: 294.21  7/16/2016 - Present     Overview Signed 10/29/2021  9:15 PM by Gregorio Martinez MD     Last Assessment & Plan:   Formatting of this note might be different from the original.  Seems a bit better today. See section under encephalopathy.              Essential hypertension (Chronic) ICD-10-CM: I10  ICD-9-CM: 401.9  1/5/2016 - Present Yes    Overview Signed 10/29/2021  9:15 PM by Gregorio Martinez MD     Last Assessment & Plan:   Formatting of this note might be different from the original.  Encouraged low sodium diet; Goal: take meds as prescribed, monitor blood pressure at home and call with readings                   Objective:     Patient Vitals for the past 24 hrs:   Temp Pulse Resp BP SpO2   11/01/21 1131 98.1 °F (36.7 °C) 68 16 118/65 96 %   11/01/21 0725 97.6 °F (36.4 °C) 87 16 116/72 94 %   11/01/21 0400  69      11/01/21 0300 97.7 °F (36.5 °C) 73 18 114/66 96 %   11/01/21 0000  76      10/31/21 2200 98.1 °F (36.7 °C) 76 20 (!) 105/93 96 %   10/31/21 2107  84  (!) 136/50    10/31/21 2057  79 18 (!) 136/50 95 %   10/31/21 1936     95 %   10/31/21 1932 98.8 °F (37.1 °C) 75 17 (!) 119/51 96 %   10/31/21 1700  67 16 (!) 94/43 96 %   10/31/21 1657     97 %   10/31/21 1600  66      10/31/21 1533 98.2 °F (36.8 °C) 72 16 (!) 128/47 92 %     Oxygen Therapy  O2 Sat (%): 96 % (11/01/21 1131)  Pulse via Oximetry: 79 beats per minute (10/31/21 2057)  O2 Device: Nasal cannula (11/01/21 1131)  Skin Assessment: Clean, dry, & intact (10/30/21 0300)  Skin Protection for O2 Device: No (10/30/21 0300)  O2 Flow Rate (L/min): 2 l/min (10/31/21 1936)    Estimated body mass index is 30.95 kg/m² as calculated from the following:    Height as of this encounter: 5' 5\" (1.651 m). Weight as of this encounter: 84.4 kg (186 lb). Intake/Output Summary (Last 24 hours) at 11/1/2021 1532  Last data filed at 11/1/2021 0600  Gross per 24 hour   Intake 936.25 ml   Output 750 ml   Net 186.25 ml         Physical Exam:     Blood pressure 118/65, pulse 68, temperature 98.1 °F (36.7 °C), resp. rate 16, height 5' 5\" (1.651 m), weight 84.4 kg (186 lb), SpO2 96 %, not currently breastfeeding. General:    Well nourished. Improving respiratory distress, presently on 2 L/min  Head:  Normocephalic, atraumatic  Eyes:  Sclerae appear normal.  Pupils equally round. ENT:  Nares appear normal, no drainage. Moist oral mucosa  Neck:  No restricted ROM. Trachea midline   CV:   RRR. No m/r/g. No jugular venous distension. Lungs:   Bilateral coarse breath sounds, improving  Abdomen: Bowel sounds present. Soft, nontender, nondistended. Extremities: Right upper extremity, recent shoulder surgery, and sling  Skin:     No rashes and normal coloration. Warm and dry. Neuro:  CN II-XII grossly intact. Sensation intact. A&Ox3  Psych:  Normal mood and affect.       I have reviewed ordered lab tests and independently visualized imaging below:    Recent Labs:  Recent Results (from the past 48 hour(s))   METABOLIC PANEL, BASIC    Collection Time: 10/31/21  3:56 AM   Result Value Ref Range    Sodium 139 136 - 145 mmol/L    Potassium 5.0 3.5 - 5.1 mmol/L    Chloride 104 98 - 107 mmol/L    CO2 32 21 - 32 mmol/L    Anion gap 3 (L) 7 - 16 mmol/L    Glucose 102 (H) 65 - 100 mg/dL    BUN 27 (H) 8 - 23 MG/DL    Creatinine 1.45 (H) 0.6 - 1.0 MG/DL    GFR est AA 45 (L) >60 ml/min/1.73m2    GFR est non-AA 37 (L) >60 ml/min/1.73m2    Calcium 9.8 8.3 - 10.4 MG/DL MAGNESIUM    Collection Time: 10/31/21  3:56 AM   Result Value Ref Range    Magnesium 2.7 (H) 1.8 - 2.4 mg/dL   METABOLIC PANEL, BASIC    Collection Time: 10/31/21  2:05 PM   Result Value Ref Range    Sodium 135 (L) 136 - 145 mmol/L    Potassium 4.4 3.5 - 5.1 mmol/L    Chloride 102 98 - 107 mmol/L    CO2 27 21 - 32 mmol/L    Anion gap 6 (L) 7 - 16 mmol/L    Glucose 118 (H) 65 - 100 mg/dL    BUN 27 (H) 8 - 23 MG/DL    Creatinine 1.08 (H) 0.6 - 1.0 MG/DL    GFR est AA >60 >60 ml/min/1.73m2    GFR est non-AA 52 (L) >60 ml/min/1.73m2    Calcium 9.0 8.3 - 45.3 MG/DL   METABOLIC PANEL, BASIC    Collection Time: 11/01/21  5:34 AM   Result Value Ref Range    Sodium 140 136 - 145 mmol/L    Potassium 4.4 3.5 - 5.1 mmol/L    Chloride 107 98 - 107 mmol/L    CO2 31 21 - 32 mmol/L    Anion gap 2 (L) 7 - 16 mmol/L    Glucose 106 (H) 65 - 100 mg/dL    BUN 26 (H) 8 - 23 MG/DL    Creatinine 0.74 0.6 - 1.0 MG/DL    GFR est AA >60 >60 ml/min/1.73m2    GFR est non-AA >60 >60 ml/min/1.73m2    Calcium 9.2 8.3 - 10.4 MG/DL   ECHO ADULT COMPLETE    Collection Time: 11/01/21 11:30 AM   Result Value Ref Range    RVSP 42.0 mmHg    Est. RA Pressure 3.0 mmHg    Left Atrium Minor Axis 3.39 cm    Left Atrium Major Axis 6.50 cm    LA Area 2C 23.20 cm2    LA Area 4C 20.10 cm2    LV ED Vol A4C 27.00 cm3    LV ES Vol A4C 12.00 cm3    IVSd 1.13 (A) 0.60 - 0.90 cm    LVIDd 4.70 3.90 - 5.30 cm    LVIDs 3.28 cm    LVOT d 2.00 cm    LVOT VTI 25.90 cm    LVOT Peak Gradient 5.00 mmHg    LVPWd 1.10 (A) 0.60 - 0.90 cm    LV E' Lateral Velocity 7.41 cm/s    LV E' Septal Velocity 6.43 cm/s    AV Cusp 2.00 cm    Aortic Valve Systolic Mean Gradient 3.55 mmHg    AoV VTI 36.40 cm    Aortic Valve Systolic Peak Velocity 815.58 cm/s    AoV PG 11.00 mmHg    Aortic Valve Area by Continuity of VTI 2.23 cm2    Aortic Valve Area by Continuity of Peak Velocity 2.14 cm2    Mitral Valve E Wave Deceleration Time 246.00 ms    MV A Vladislav 101.00 cm/s    MV E Vladislav 102.00 cm/s    MV Mean Gradient 2.00 mmHg    Mitral Valve Annulus Velocity Time Integral 33.80 cm    RVIDd 2.40 cm    TR Max Velocity 292.00 cm/s    Triscuspid Valve Regurgitation Peak Gradient 34.00 mmHg    Tapse 2.80 (A) 1.50 - 2.00 cm    MV E/A 1.01     LV Mass .1 67.0 - 162.0 g    LV Mass AL Index 99.5 43.0 - 95.0 g/m2    E/E' lateral 13.77     E/E' septal 15.86     E/E' ratio (averaged) 14.81     PALMER/BSA Pk Vladislav 1.1 cm2/m2    PALMER/BSA VTI 1.2 cm2/m2   SARS-COV-2    Collection Time: 11/01/21 12:31 PM   Result Value Ref Range    SARS-CoV-2 Please find results under separate order         All Micro Results     Procedure Component Value Units Date/Time    SARS-COV-2, PCR [[id number]] Collected: 11/01/21 1231    Order Status: Completed Updated: 11/01/21 1245          Other Studies:  ECHO ADULT COMPLETE    Result Date: 11/1/2021  · TV: Mild tricuspid valve regurgitation is present. Right Ventricular Arterial Pressure (RVSP) is 42 mmHg. Pulmonary hypertension found to be mild. · Image quality for this study was technically difficult. · LV: Estimated LVEF is 60 - 65%. Normal cavity size and systolic function (ejection fraction normal). Mild concentric hypertrophy. Wall motion: normal. Age-appropriate left ventricular diastolic function. · LA: Mildly dilated left atrium. · AV: Mild aortic valve regurgitation is present. · MV: Mild mitral valve regurgitation is present. · PV: Mild pulmonic valve regurgitation is present.         Current Meds:  Current Facility-Administered Medications   Medication Dose Route Frequency    tuberculin injection 5 Units  5 Units IntraDERMal ONCE    hydrALAZINE (APRESOLINE) tablet 25 mg  25 mg Oral TID    magnesium oxide (MAG-OX) tablet 400 mg  400 mg Oral BID    ibuprofen (MOTRIN) tablet 400 mg  400 mg Oral Q6H PRN    [Held by provider] furosemide (LASIX) injection 40 mg  40 mg IntraVENous BID    ALPRAZolam (XANAX) tablet 0.25 mg  0.25 mg Oral DAILY PRN    busPIRone (BUSPAR) tablet 20 mg  20 mg Oral TID    celecoxib (CELEBREX) capsule 100 mg  100 mg Oral BID    donepeziL (ARICEPT) tablet 10 mg  10 mg Oral QHS    fenofibrate (LOFIBRA) tablet 160 mg  160 mg Oral DAILY    FLUoxetine (PROzac) capsule 20 mg  20 mg Oral 7am    gabapentin (NEURONTIN) capsule 400 mg  400 mg Oral QID    levothyroxine (SYNTHROID) tablet 75 mcg  75 mcg Oral ACB    [Held by provider] losartan (COZAAR) tablet 25 mg  25 mg Oral DAILY    melatonin tablet 10 mg  10 mg Oral QHS    mirtazapine (REMERON) tablet 45 mg  45 mg Oral QHS    pravastatin (PRAVACHOL) tablet 80 mg  80 mg Oral QHS    dextrose 40% (GLUTOSE) oral gel 1 Tube  15 g Oral PRN    glucagon (GLUCAGEN) injection 1 mg  1 mg IntraMUSCular PRN    dextrose (D50W) injection syrg 12.5-25 g  25-50 mL IntraVENous PRN    sodium chloride (NS) flush 5-40 mL  5-40 mL IntraVENous Q8H    sodium chloride (NS) flush 5-40 mL  5-40 mL IntraVENous PRN    acetaminophen (TYLENOL) tablet 650 mg  650 mg Oral Q6H PRN    Or    acetaminophen (TYLENOL) suppository 650 mg  650 mg Rectal Q6H PRN    polyethylene glycol (MIRALAX) packet 17 g  17 g Oral DAILY    promethazine (PHENERGAN) tablet 12.5 mg  12.5 mg Oral Q6H PRN    Or    ondansetron (ZOFRAN) injection 4 mg  4 mg IntraVENous Q6H PRN    enoxaparin (LOVENOX) injection 40 mg  40 mg SubCUTAneous DAILY    HYDROcodone-acetaminophen (NORCO)  mg tablet 1 Tablet  1 Tablet Oral Q6H PRN    morphine injection 4 mg  4 mg IntraVENous Q4H PRN    metoprolol tartrate (LOPRESSOR) tablet 25 mg  25 mg Oral Q12H       Signed:  Addison Gold MD

## 2021-11-01 NOTE — PROGRESS NOTES
Problem: Mobility Impaired (Adult and Pediatric)  Goal: *Acute Goals and Plan of Care (Insert Text)  Outcome: Progressing Towards Goal  Note: STG:  (1.)Ms. Stevens will move from supine to sit and sit to supine  with STAND BY ASSIST within 4-7 treatment day(s). (2.)Ms. Stevens will transfer from bed to chair and chair to bed with STAND BY ASSIST using the least restrictive device within 4-7 treatment day(s). (3.)Ms. Stevens will ambulate with STAND BY ASSIST/CONTACT GUARD ASSIST for 100 feet with the least restrictive device within 4-7 treatment day(s). ________________________________________________________________________________________________      PHYSICAL THERAPY: Daily Note and AM 11/1/2021  INPATIENT: PT Visit Days : 2  [place] / Plan[place] PART [place] B / Product Type: Medicare /       NAME/AGE/GENDER: Tae Branch is a 67 y.o. female   PRIMARY DIAGNOSIS: Acute hypoxemic respiratory failure ([facility] [J96.01] Acute hypoxemic respiratory failure ([facility] Acute hypoxemic respiratory failure (HCC)       ICD-10: Treatment Diagnosis:    · Generalized Muscle Weakness (M62.81)  · Difficulty in walking, Not elsewhere classified (R26.2)   Precaution/Allergies:  Morphine, Oxycodone, and Oxycodone-acetaminophen      ASSESSMENT:     Ms. Adriana Hennessy was admitted to the hospital with above diagnosis. She had right shoulder surgery at [facility] and is currently in her sling. Unsure of the procedure she had, they were sending home health for follow up. She is right handed and now having acute respiratory failure. She is in our ICU. She lives at home alone and has a son that is supportive. She presents with generalized weakness and decreased independence with functional mobility. She will benefit from skilled PT interventions to maximize independence with mobility.  Depending on how long she stays here we may need to reach out to [facility] and see what her shoulder protocol is or get a handle on what the pt remembers. She admitted she was having a really bad day today. Today we worked on bed mobility, sit to stand, walked in room and standing balance at sink so she could brush her teeth. Helped her return supine with needs in reach. Did make an ice pack for her right shoulder. 11/1 supine upon arrival.  Performs B LE exercises with help. Work on bed mobility as follows: supine>EOB with Min A and extra time, NonWB'ing through R UE. Walk 30 ft in the room with HHA and some couple LOB, recover with Min A. Left in chair with needs in reach and instructed to call for assistance. This section established at most recent assessment   PROBLEM LIST (Impairments causing functional limitations):  1. Decreased Strength  2. Decreased ADL/Functional Activities  3. Decreased Transfer Abilities  4. Decreased Ambulation Ability/Technique  5. Decreased Balance  6. Increased Pain  7. Decreased Activity Tolerance   INTERVENTIONS PLANNED: (Benefits and precautions of physical therapy have been discussed with the patient.)  1. Balance Exercise  2. Bed Mobility  3. Gait Training  4. Therapeutic Activites  5. Therapeutic Exercise/Strengthening  6. Transfer Training     TREATMENT PLAN: Frequency/Duration: daily for duration of hospital stay  Rehabilitation Potential For Stated Goals: Good     REHAB RECOMMENDATIONS (at time of discharge pending progress):    Placement: It is my opinion, based on this patient's performance to date, that Ms. Stevens may benefit from [address] after discharge due to the functional deficits listed above that are likely to improve with skilled rehabilitation because he/she has multiple medical issues that affect his/her functional mobility in the community.   Equipment:    None at this time              HISTORY:   History of Present Injury/Illness (Reason for Referral):  Copied from MD note:   Jhonatan Zheng is a 67 y.o. female with medical history of hypertension, diastolic heart failure, pulmonary hypertension, and shoulder surgery yesterday,,who presented with shortness of breath and anxiety. Patient reports that she was admitted to [facility] yesterday for shoulder surgery, and was discharged today. She does states she was on oxygen at times during her hospital stay. She denies lung disease, or significant cardiac disease. She states she does not have a history of heart failure. She states she had an echocardiogram recently, as far she knows that was normal.  Initially she stated her symptoms started yesterday. However she tells me she has had trouble breathing when lying flat for probably a couple of weeks. She denies lower extremity edema. Her O2 saturations were noted to be as low as 65% on room air. She did respond well to supplemental oxygen initially, but then had some desaturation with exertion. Chest x-ray done in the ER was concerning for pulmonary edema. She was given IV Lasix in the ER and she has had good output. We are asked to admit for further evaluation and management. She denies fever/chills, NVD, abdominal pain, chest pain, cough. She is fully vaccinated for Covid, but has not had a booster shot.   Past Medical History/Comorbidities:   Ms. Joya Osborn  has a past medical history of Adjustment disorder with anxious mood (10/3/2017), Arthritis, Atrial fibrillation ([address] (2016), BMI 31.0-31.9,adult, Cataract of both eyes, CHF (congestive heart failure) ([address] Chronic pain, Dementia ([address] Generalized anxiety disorder (11/17/2016), GERD (gastroesophageal reflux disease), High blood pressure, High cholesterol, Insomnia secondary to anxiety (11/17/2016), Irregular heart beat, Left knee pain, Major depressive disorder, recurrent ([address] (11/17/2016), Neuropathy, Osteoarthritis of left hip (3/11/2021), Overweight (BMI 25.0-29.9) (2/25/2018), Poor historian, Prediabetes, Primary insomnia (11/17/2016), PUD (peptic ulcer disease), Thyroid disease, Unresolved grief (6/22/2017), and Vitamin B 12 deficiency. Ms. Jordon Gee  has a past surgical history that includes hx hysterectomy; hx lap cholecystectomy; hx knee replacement (Right); hx knee replacement (Left); hx back surgery; pelvis/hip joint surgery unlisted (Left, 03/11/2021); and hx hip replacement. Social History/Living Environment:   Home Environment: Private residence  # Steps to Enter: 4  Rails to Enter: Yes  Hand Rails : Right  One/Two Story Residence: One story  Living Alone: Yes  Support Systems: Child(clari)  Patient Expects to be Discharged to[de-identified] House  Current DME Used/Available at Home: [address], straight, Shower chair, Walker, rolling  Tub or Shower Type: Tub/Shower combination  Prior Level of Function/Work/Activity:  Pt was living at home, recent right shoulder surgery, son supportive  Dominant Side:         RIGHT   Number of Personal Factors/Comorbidities that affect the Plan of Care: 1-2: MODERATE COMPLEXITY   EXAMINATION:   Most Recent Physical Functioning:   Gross Assessment:                  Posture:     Balance:  Sitting: Intact  Standing: Intact; With support Bed Mobility:  Supine to Sit: Contact guard assistance; Additional time (can not WB through R UE)  Sit to Supine:  (left up in chair)  Scooting: Contact guard assistance  Wheelchair Mobility:     Transfers:  Sit to Stand: Contact guard assistance  Stand to Sit: Contact guard assistance  Duration: 30 Minutes  Gait:     Speed/Ekaterina: Pace decreased (<100 feet/min)  Step Length: Left shortened;Right shortened  Gait Abnormalities: Decreased step clearance  Distance (ft): 30 Feet (ft)  Assistive Device: Other (comment) (no AD)  Ambulation - Level of Assistance: Minimal assistance; Additional time      Body Structures Involved:  1. Muscles Body Functions Affected:  1. Movement Related Activities and Participation Affected:  1. Mobility  2.  Self Care   Number of elements that affect the Plan of Care: 4+: HIGH COMPLEXITY   CLINICAL PRESENTATION:   Presentation: Stable and uncomplicated: LOW COMPLEXITY   CLINICAL DECISION MAKING:   [facility] AM-[facility]AC[facility] 6 Clicks   Basic Mobility Inpatient Short Form  How much difficulty does the patient currently have. .. Unable A Lot A Little None   1. Turning over in bed (including adjusting bedclothes, sheets and blankets)? [] 1   [] 2   [x] 3   [] 4   2. Sitting down on and standing up from a chair with arms ( e.g., wheelchair, bedside commode, etc.)   [] 1   [] 2   [x] 3   [] 4   3. Moving from lying on back to sitting on the side of the bed? [] 1   [] 2   [x] 3   [] 4   How much help from another person does the patient currently need. .. Total A Lot A Little None   4. Moving to and from a bed to a chair (including a wheelchair)? [] 1   [] 2   [x] 3   [] 4   5. Need to walk in hospital room? [] 1   [] 2   [x] 3   [] 4   6. Climbing 3-5 steps with a railing? [] 1   [] 2   [x] 3   [] 4   © 2007, Trustees of [facility], under license to [employer]. All rights reserved      Score:  Initial: 18 Most Recent: X (Date: -- )    Interpretation of Tool:  Represents activities that are increasingly more difficult (i.e. Bed mobility, Transfers, Gait). Medical Necessity:     · Patient is expected to demonstrate progress in   · strength and functional technique  ·  to   · decrease assistance required with functional mobility  · . Reason for Services/Other Comments:  · Patient continues to require skilled intervention due to   · Inability to complete functional mobility independently  · .    Use of outcome tool(s) and clinical judgement create a POC that gives a: Clear prediction of patient's progress: LOW COMPLEXITY            TREATMENT:   (In addition to Assessment/Re-Assessment sessions the following treatments were rendered)   Pre-treatment Symptoms/Complaints:  none  Pain: Initial:      Post Session:       Therapeutic Activity: (  30 Minutes ):  Therapeutic activities including Bed transfers, Ambulation on level ground, and standing balance at the sink to improve mobility and strength. Required minimal verbal cues  to promote static and dynamic balance in standing. Date:  11/1 Date:   Date:     Activity/Exercise Parameters Parameters Parameters   Ankle pumps 15 B     Quad sets 15 B     heelsides 15 B     Hip abd/add 15 B     SAQ 15 B     SLR 15 B             Assessment    Braces/Orthotics/Lines/Etc:   · IV  · Telemetry monitoring  · O2 Device: Nasal cannula  Treatment/Session Assessment:    · Response to Treatment:  Pt making slow progress, due to [address] on the R UE.  · Interdisciplinary Collaboration:   o Registered Nurse  · After treatment position/precautions:   o Up in chair  o Bed/Chair-wheels locked  o Bed in low position  o Call light within reach  o RN notified   · Compliance with Program/Exercises: Will assess as treatment progresses  · Recommendations/Intent for next treatment session: \"Next visit will focus on advancements to more challenging activities and reduction in assistance provided\".   Total Treatment Duration:  PT Patient Time In/Time Out  Time In: 0900  Time Out: 0930    Asuncion Santizo, PTA

## 2021-11-02 VITALS
DIASTOLIC BLOOD PRESSURE: 51 MMHG | TEMPERATURE: 98.1 F | WEIGHT: 186 LBS | OXYGEN SATURATION: 96 % | BODY MASS INDEX: 30.99 KG/M2 | HEART RATE: 75 BPM | SYSTOLIC BLOOD PRESSURE: 116 MMHG | RESPIRATION RATE: 18 BRPM | HEIGHT: 65 IN

## 2021-11-02 PROBLEM — G62.9 NEUROPATHY: Status: ACTIVE | Noted: 2021-11-02

## 2021-11-02 LAB
ANION GAP SERPL CALC-SCNC: 2 MMOL/L (ref 7–16)
BUN SERPL-MCNC: 27 MG/DL (ref 8–23)
CALCIUM SERPL-MCNC: 9.2 MG/DL (ref 8.3–10.4)
CHLORIDE SERPL-SCNC: 103 MMOL/L (ref 98–107)
CO2 SERPL-SCNC: 35 MMOL/L (ref 21–32)
CREAT SERPL-MCNC: 0.86 MG/DL (ref 0.6–1)
GLUCOSE SERPL-MCNC: 96 MG/DL (ref 65–100)
MM INDURATION POC: 0 MM (ref 0–5)
POTASSIUM SERPL-SCNC: 3.9 MMOL/L (ref 3.5–5.1)
PPD POC: NEGATIVE NEGATIVE
SARS-COV-2, COV2: NOT DETECTED
SODIUM SERPL-SCNC: 140 MMOL/L (ref 136–145)
SPECIMEN SOURCE, FCOV2M: NORMAL

## 2021-11-02 PROCEDURE — 74011250637 HC RX REV CODE- 250/637: Performed by: FAMILY MEDICINE

## 2021-11-02 PROCEDURE — 36415 COLL VENOUS BLD VENIPUNCTURE: CPT

## 2021-11-02 PROCEDURE — 2709999900 HC NON-CHARGEABLE SUPPLY

## 2021-11-02 PROCEDURE — 80048 BASIC METABOLIC PNL TOTAL CA: CPT

## 2021-11-02 PROCEDURE — 74011250636 HC RX REV CODE- 250/636: Performed by: FAMILY MEDICINE

## 2021-11-02 RX ORDER — FUROSEMIDE 20 MG/1
20 TABLET ORAL
Qty: 30 TABLET | Refills: 0 | Status: SHIPPED | OUTPATIENT
Start: 2021-11-02

## 2021-11-02 RX ORDER — ALPRAZOLAM 0.25 MG/1
0.25 TABLET ORAL
Qty: 3 TABLET | Refills: 0 | Status: SHIPPED | OUTPATIENT
Start: 2021-11-02 | End: 2022-02-07 | Stop reason: SDUPTHER

## 2021-11-02 RX ORDER — HYDROCODONE BITARTRATE AND ACETAMINOPHEN 5; 325 MG/1; MG/1
1 TABLET ORAL
Qty: 18 TABLET | Refills: 0 | Status: SHIPPED | OUTPATIENT
Start: 2021-11-02 | End: 2021-11-05

## 2021-11-02 RX ORDER — GABAPENTIN 800 MG/1
800 TABLET ORAL 3 TIMES DAILY
Qty: 9 TABLET | Refills: 0 | Status: SHIPPED | OUTPATIENT
Start: 2021-11-02

## 2021-11-02 RX ADMIN — CELECOXIB 100 MG: 100 CAPSULE ORAL at 09:10

## 2021-11-02 RX ADMIN — BUSPIRONE HYDROCHLORIDE 20 MG: 5 TABLET ORAL at 09:10

## 2021-11-02 RX ADMIN — ENOXAPARIN SODIUM 40 MG: 40 INJECTION SUBCUTANEOUS at 10:07

## 2021-11-02 RX ADMIN — GABAPENTIN 400 MG: 400 CAPSULE ORAL at 09:11

## 2021-11-02 RX ADMIN — HYDRALAZINE HYDROCHLORIDE 25 MG: 50 TABLET, FILM COATED ORAL at 09:11

## 2021-11-02 RX ADMIN — Medication 400 MG: at 09:10

## 2021-11-02 RX ADMIN — HYDROCODONE BITARTRATE AND ACETAMINOPHEN 1 TABLET: 10; 325 TABLET ORAL at 03:49

## 2021-11-02 RX ADMIN — HYDROCODONE BITARTRATE AND ACETAMINOPHEN 1 TABLET: 10; 325 TABLET ORAL at 11:50

## 2021-11-02 RX ADMIN — LEVOTHYROXINE SODIUM 75 MCG: 0.07 TABLET ORAL at 05:35

## 2021-11-02 RX ADMIN — FLUOXETINE 20 MG: 20 CAPSULE ORAL at 05:35

## 2021-11-02 RX ADMIN — METOPROLOL TARTRATE 25 MG: 25 TABLET, FILM COATED ORAL at 09:10

## 2021-11-02 RX ADMIN — FENOFIBRATE 160 MG: 160 TABLET ORAL at 09:11

## 2021-11-02 NOTE — DISCHARGE INSTRUCTIONS
Cbc,bmp and magnesium in a week, [facility] home physician to follow up on labs. Follow up with pcp in a week after discharge from rehab. Look for any signs of infection rt shoulder- recent surgery. Pt has appointment with orthopedic on 11/10/2021. Lasix 20 mg only if weight increase greater then 2 lb/day or 5 lb/week. Pt is discharged on oxygen  2 lit/min nasal cannula.

## 2021-11-02 NOTE — PROGRESS NOTES
Care Management Interventions  PCP Verified by CM: Yes  Mode of Transport at Discharge: Self  Transition of Care Consult (CM Consult): [address]: No  Reason Outside [place]: Patient already serviced by other home [id number]: Child(clari)  Confirm Follow Up Transport: Family  The Plan for Transition of Care is Related to the Following Treatment Goals : PT/OT/RN  The Patient and/or Patient Representative was Provided with a Choice of Provider and Agrees with the Discharge Plan?: Yes  Name of the Patient Representative Who was Provided with a Choice of Provider and Agrees with the Discharge Plan: Lian Giron  Freedom of Choice List was Provided with Basic Dialogue that Supports the Patient's Individualized Plan of Care/Goals, Treatment Preferences and Shares the Quality Data Associated with the Providers?: Yes  Discharge Location  Discharge Placement: Skilled nursing facility [facility])    Saw pt in interdisciplinarily rounds with the following disciplines: Physician, Case Management, Nursing, Dietary,Therapy and Pharmacy. The plan of care was discussed along with goals for discharge date/ location.      Per , pt is medically stable for d/c today, pt was picked up by Melina Hobson and transported to [facility], [address] notified pt, gave pt's nurse call report info    Blane Decker MSROE

## 2021-11-02 NOTE — DISCHARGE SUMMARY
Hospitalist Discharge Summary     Admit Date:  10/29/2021  6:49 PM   Name:  Juan Ortiz   Age:  67 y.o.  :  1945   MRN:  [id number]   PCP:  Jacinta Francois, Not On File, MD  Treatment Team: Attending Provider: Karmen Bee MD; : Rodney Solomon; Utilization Review: Myah Matthew RN; : Vianca Perez    Problem List for this Hospitalization:  Hospital Problems as of 2021 Date Reviewed: 2021        Codes Class Noted - Resolved POA    Neuropathy ICD-10-CM: G62.9  ICD-9-CM: 355.9  2021 - Present Unknown        SANDRA (acute kidney injury) ([facility] ICD-10-CM: N17.9  ICD-9-CM: 584.9  10/31/2021 - Present Unknown        Hypomagnesemia ICD-10-CM: E83.42  ICD-9-CM: 275.2  10/30/2021 - Present Unknown        * (Principal) Acute hypoxemic respiratory failure ([facility] ICD-10-CM: J96.01  ICD-9-CM: 518.81  10/29/2021 - Present Yes        History of shoulder surgery ICD-10-CM: Z98.890  ICD-9-CM: V45.89  10/29/2021 - Present Yes    Overview Signed 10/29/2021  9:20 PM by Tabitha Isaacs MD     Had outpt Rt shoulder surgery on 10/28/21 at [facility]             Late onset Alzheimer dementia [facility]) (Chronic) ICD-10-CM: G30.1, F02.80  ICD-9-CM: 331.0  10/29/2021 - Present Yes        Pulmonary hypertension ([facility] (Chronic) ICD-10-CM: I27.20  ICD-9-CM: 416.8  10/28/2021 - Present Yes        Chronic diastolic CHF (congestive heart failure) ([facility] (Chronic) ICD-10-CM: I50.32  ICD-9-CM: 428.32, 428.0  10/25/2021 - Present Yes        Recurrent major depressive disorder, in partial remission ([address] (Chronic) ICD-10-CM: F33.41  ICD-9-CM: 296.35  2018 - Present Yes    Overview Signed 10/29/2021  9:13 PM by Tabitha Isaacs MD     With associated Anxiety             Generalized anxiety disorder ICD-10-CM: F41.1  ICD-9-CM: 300.02  2016 - Present         Dementia with behavioral disturbance ([facility] ICD-10-CM: F03.91  ICD-9-CM: 294.21  2016 - Present     Overview Signed 10/29/2021 9:15 PM by Frank Kidd MD     Last Assessment & Plan:   Formatting of this note might be different from the original.  Seems a bit better today. See section under encephalopathy. Essential hypertension (Chronic) ICD-10-CM: I10  ICD-9-CM: 401.9  1/5/2016 - Present Yes    Overview Signed 10/29/2021  9:15 PM by Frank Kidd MD     Last Assessment & Plan:   Formatting of this note might be different from the original.  Encouraged low sodium diet; Goal: take meds as prescribed, monitor blood pressure at home and call with readings                     Admission HPI from 10/29/2021:    Cande Javed is a 67 y.o. female with medical history of hypertension, diastolic heart failure, pulmonary hypertension, and shoulder surgery yesterday,,who presented with shortness of breath and anxiety. Patient reports that she was admitted to [facility] yesterday for shoulder surgery, and was discharged today. She does states she was on oxygen at times during her hospital stay. She denies lung disease, or significant cardiac disease. She states she does not have a history of heart failure. She states she had an echocardiogram recently, as far she knows that was normal.  Initially she stated her symptoms started yesterday. However she tells me she has had trouble breathing when lying flat for probably a couple of weeks. She denies lower extremity edema. Her O2 saturations were noted to be as low as 65% on room air. She did respond well to supplemental oxygen initially, but then had some desaturation with exertion. Chest x-ray done in the ER was concerning for pulmonary edema. She was given IV Lasix in the ER and she has had good output. We are asked to admit for further evaluation and management. She denies fever/chills, NVD, abdominal pain, chest pain, cough.   She is fully vaccinated for Covid, but has not had a booster     Hospital Course:  Pt was admitted  acute hypoxic respiratory failure. Patient proBNP was 1502. Chest x-ray showed pulmonary edema/fluid overload. Patient does have history of diastolic heart failure. Says does not use Lasix on a regular basis, normally uses as needed and it has been few years before she had to use any Lasix. Patient recently had right shoulder surgery. Possibility of getting IV fluids during procedure. Patient was continued on Lasix. At admission she got 80 mg and the next day morning she got 40 mg and that evening she got 1 more 40 mg. So total around 160 mg of Lasix, patient developed kidney failure, her Lasix was discontinued and actually started on IV fluids. Kidney functions again back to normal.  IV fluids discontinued and patient was given a small dose of Lasix 20 mg on 11/1/2021. Patient respiratory status markedly improved, presently requiring 2 L/min. Echo done during the stay showed age-appropriate diastolic function. Patient hypomagnesemia replaced. Magnesium normal at discharge. Discussed with patient, explained patient that she should be taking Lasix as needed only if her weight greater than 2 pounds per day or 5 pounds per week. Patient is clinically stable for discharge. Patient was discharged to rehab. Follow up instructions below. Plan was discussed with patient. All questions answered. Patient was stable at time of discharge and was instructed to call or return if there are any concerns or recurrence of symptoms. Diagnostic Imaging/Tests:   XR CHEST PORT    Result Date: 10/29/2021  EXAM: XR CHEST PORT INDICATION: dyspnea / hypoxia, recent shoulder surgery COMPARISON: None. FINDINGS: A portable AP radiograph of the chest was obtained at 1907 hours. The patient is on a cardiac monitor. I lateral airspace disease with small right pleural effusion. The cardiac and mediastinal contours and pulmonary vascularity are normal.  The bones and soft tissues are grossly within normal limits.      Findings most consistent with pulmonary edema with a right pleural effusion. Echocardiogram results:  No results found for this visit on 10/29/21. All Micro Results     Procedure Component Value Units Date/Time    SARS-COV-2, PCR [[id number]] Collected: 11/01/21 1231    Order Status: Completed Updated: 11/01/21 1245          Labs: Results:       BMP, Mg, Phos Recent Labs     11/02/21  0554 11/01/21  0534 10/31/21  1405 10/31/21  0356 10/31/21  0356    140 135*   < > 139   K 3.9 4.4 4.4   < > 5.0    107 102   < > 104   CO2 35* 31 27   < > 32   AGAP 2* 2* 6*   < > 3*   BUN 27* 26* 27*   < > 27*   CREA 0.86 0.74 1.08*   < > 1.45*   CA 9.2 9.2 9.0   < > 9.8   GLU 96 106* 118*   < > 102*   MG  --   --   --   --  2.7*    < > = values in this interval not displayed. CBC No results for input(s): WBC, RBC, HGB, HCT, PLT, GRANS, LYMPH, EOS, MONOS, BASOS, IG, ANEU, ABL, MESERET, ABM, ABB, AIG, HGBEXT, HCTEXT, PLTEXT in the last 72 hours. LFT No results for input(s): ALT, TBIL, AP, TP, ALB, GLOB, AGRAT in the last 72 hours.     No lab exists for component: SGOT, GPT   Cardiac Testing No results found for: BNPP, BNP, CPK, RCK1, RCK2, RCK3, RCK4, CKMB, CKNDX, CKND1, TROPT, TROIQ   Coagulation Tests Lab Results   Component Value Date/Time    Prothrombin time 13.8 10/29/2021 07:16 PM    Prothrombin time 14.6 02/16/2021 11:03 AM    Prothrombin time 10.4 08/02/2010 10:17 AM    INR 1.0 10/29/2021 07:16 PM    INR 1.1 02/16/2021 11:03 AM    INR 1.0 08/02/2010 10:17 AM    aPTT 24.6 02/16/2021 11:03 AM    aPTT 24.0 (L) 08/02/2010 10:17 AM      A1c Lab Results   Component Value Date/Time    Hemoglobin A1c 5.4 10/30/2021 03:09 AM    Hemoglobin A1c 5.7 02/16/2021 11:03 AM      Lipid Panel No results found for: CHOL, CHOLPOCT, CHOLX, CHLST, CHOLV, 432990, HDL, HDLP, LDL, LDLC, DLDLP, 762919, VLDLC, VLDL, TGLX, TRIGL, TRIGP, TGLPOCT, CHHD, CHHDX   Thyroid Panel No results found for: T4, T3U, TSH, TSHEXT     Most Recent UA Lab Results   Component Value Date/Time    Color STRAW 08/02/2010 10:20 AM    Appearance CLEAR 08/02/2010 10:20 AM    pH (UA) 6.0 08/02/2010 10:20 AM    Protein NEGATIVE  08/02/2010 10:20 AM    Glucose NEGATIVE  08/02/2010 10:20 AM    Ketone NEGATIVE  08/02/2010 10:20 AM    Bilirubin NEGATIVE  08/02/2010 10:20 AM    Blood SMALL (A) 08/02/2010 10:20 AM    Urobilinogen 0.2 08/02/2010 10:20 AM    Nitrites NEGATIVE  08/02/2010 10:20 AM    Leukocyte Esterase MODERATE (A) 08/02/2010 10:20 AM        Allergies   Allergen Reactions    Morphine Nausea Only    Oxycodone Nausea and Vomiting    Oxycodone-Acetaminophen Nausea Only     Immunization History   Administered Date(s) Administered    COVID-19, Moderna, Primary or Immunocompromised Series, MRNA, PF, 100mcg/0.5mL 02/25/2021, 07/14/2021    Influenza High Dose Vaccine PF 10/11/2017, 10/29/2019    Influenza Nasal Vaccine 10/01/2015    Influenza Vaccine 10/19/2011, 11/28/2012, 11/19/2013, 10/20/2014, 09/05/2016, 09/13/2018    Influenza Vaccine (Quadrivalent)(>18 Yrs Flublok [id number]) 10/06/2020    Influenza, High-dose, Quadrivalent (>65 Yrs Fluzone High Dose Quad [id number]) 10/01/2020    Pneumococcal Conjugate (PCV-13) 09/05/2016    Pneumococcal Polysaccharide (PPSV-23) 11/28/2012    TB Skin Test (PPD) Intradermal 11/01/2021       All Labs from Last 24 Hrs:  Recent Results (from the past 24 hour(s))   ECHO ADULT COMPLETE    Collection Time: 11/01/21 11:30 AM   Result Value Ref Range    RVSP 42.0 mmHg    Est. RA Pressure 3.0 mmHg    Left Atrium Minor Axis 3.39 cm    Left Atrium Major Axis 6.50 cm    LA Area 2C 23.20 cm2    LA Area 4C 20.10 cm2    LV ED Vol A4C 27.00 cm3    LV ES Vol A4C 12.00 cm3    IVSd 1.13 (A) 0.60 - 0.90 cm    LVIDd 4.70 3.90 - 5.30 cm    LVIDs 3.28 cm    LVOT d 2.00 cm    LVOT VTI 25.90 cm    LVOT Peak Gradient 5.00 mmHg    LVPWd 1.10 (A) 0.60 - 0.90 cm    LV E' Lateral Velocity 7.41 cm/s    LV E' Septal Velocity 6.43 cm/s    AV Cusp 2.00 cm    Aortic Valve Systolic Mean Gradient 5.00 mmHg    AoV VTI 36.40 cm    Aortic Valve Systolic Peak Velocity 646.29 cm/s    AoV PG 11.00 mmHg    Aortic Valve Area by Continuity of VTI 2.23 cm2    Aortic Valve Area by Continuity of Peak Velocity 2.14 cm2    Mitral Valve E Wave Deceleration Time 246.00 ms    MV A Vladislav 101.00 cm/s    MV E Vladislav 102.00 cm/s    MV Mean Gradient 2.00 mmHg    Mitral Valve Annulus Velocity Time Integral 33.80 cm    RVIDd 2.40 cm    TR Max Velocity 292.00 cm/s    Triscuspid Valve Regurgitation Peak Gradient 34.00 mmHg    Tapse 2.80 (A) 1.50 - 2.00 cm    MV E/A 1.01     LV Mass .1 67.0 - 162.0 g    LV Mass AL Index 99.5 43.0 - 95.0 g/m2    E/E' lateral 13.77     E/E' septal 15.86     E/E' ratio (averaged) 14.81     PALMER/BSA Pk Vladislav 1.1 cm2/m2    PALMER/BSA VTI 1.2 cm2/m2   SARS-COV-2    Collection Time: 11/01/21 12:31 PM   Result Value Ref Range    SARS-CoV-2 Please find results under separate order     METABOLIC PANEL, BASIC    Collection Time: 11/02/21  5:54 AM   Result Value Ref Range    Sodium 140 136 - 145 mmol/L    Potassium 3.9 3.5 - 5.1 mmol/L    Chloride 103 98 - 107 mmol/L    CO2 35 (H) 21 - 32 mmol/L    Anion gap 2 (L) 7 - 16 mmol/L    Glucose 96 65 - 100 mg/dL    BUN 27 (H) 8 - 23 MG/DL    Creatinine 0.86 0.6 - 1.0 MG/DL    GFR est AA >60 >60 ml/min/1.73m2    GFR est non-AA >60 >60 ml/min/1.73m2    Calcium 9.2 8.3 - 10.4 MG/DL   PLEASE READ & DOCUMENT PPD TEST IN 24 HRS    Collection Time: 11/02/21  9:27 AM   Result Value Ref Range    PPD Negative Negative    mm Induration 0 0 - 5 mm       Discharge Exam:  Patient Vitals for the past 24 hrs:   Temp Pulse Resp BP SpO2   11/02/21 0741 98 °F (36.7 °C) 68 16 (!) 121/45 96 %   11/02/21 0349 97.8 °F (36.6 °C)       11/02/21 0344  68 18 (!) 128/55 93 %   11/01/21 2157 99.1 °F (37.3 °C) 70 18 105/62 93 %   11/01/21 2141     96 %   11/01/21 2109    (!) 109/53    11/01/21 1903 98.6 °F (37 °C) 69 20 109/64 96 %   11/01/21 1543 98.1 °F (36.7 °C) 61 18 (!) 116/51 98 %   11/01/21 1131 98.1 °F (36.7 °C) 68 16 118/65 96 %     Oxygen Therapy  O2 Sat (%): 96 % (11/02/21 0741)  Pulse via Oximetry: 72 beats per minute (11/01/21 2141)  O2 Device: Nasal cannula (11/01/21 2141)  Skin Assessment: Clean, dry, & intact (10/30/21 0300)  Skin Protection for O2 Device: No (10/30/21 0300)  O2 Flow Rate (L/min): 2 l/min (10/31/21 1936)    Intake/Output Summary (Last 24 hours) at 11/2/2021 1011  Last data filed at 11/2/2021 0526  Gross per 24 hour   Intake    Output 1800 ml   Net -1800 ml       General:    Well nourished. Alert. No distress. On 2 L/min  Eyes:   Normal sclera. Extraocular movements intact. ENT:  Normocephalic, atraumatic. Moist mucous membranes  CV:   Regular rate and rhythm. No murmur, rub, or gallop. Lungs:  Marked improved bilateral coarse breath sounds  Abdomen: Soft, nontender, nondistended. Bowel sounds normal.   Extremities: Right upper extremity in sling, dressing to the right shoulder region, minimal tenderness, dressing intact. Normal range of movements of all other extremities. Neurologic: CN II-XII grossly intact. Sensation intact. Skin:     No rashes or jaundice. Psych:  Normal mood and affect. Discharge Info:   Current Discharge Medication List      START taking these medications    Details   HYDROcodone-acetaminophen (NORCO) 5-325 mg per tablet Take 1 Tablet by mouth every four (4) hours as needed for Pain for up to 3 days. Max Daily Amount: 6 Tablets. Qty: 18 Tablet, Refills: 0  Start date: 11/2/2021, End date: 11/5/2021    Associated Diagnoses: History of shoulder surgery         CONTINUE these medications which have CHANGED    Details   ALPRAZolam (XANAX) 0.25 mg tablet Take 1 Tablet by mouth daily as needed for Anxiety. Qty: 3 Tablet, Refills: 0  Start date: 11/2/2021    Associated Diagnoses: Generalized anxiety disorder      gabapentin (NEURONTIN) 800 mg tablet Take 1 Tablet by mouth three (3) times daily.   Qty: 9 Tablet, Refills: 0  Start date: 11/2/2021      furosemide (LASIX) 20 mg tablet Take 1 Tablet by mouth daily as needed for PRN Reason (Other) (weight increase > 2 lb/day or 5 lb/week. ). Qty: 30 Tablet, Refills: 0  Start date: 11/2/2021         CONTINUE these medications which have NOT CHANGED    Details   FLUoxetine (PROzac) 20 mg capsule Take 1 Capsule by mouth every morning. Qty: 30 Capsule, Refills: 4    Associated Diagnoses: Mild episode of recurrent major depressive disorder (HCC)      mirtazapine (REMERON) 45 mg tablet Take 1 Tablet by mouth nightly. Qty: 30 Tablet, Refills: 4    Associated Diagnoses: Primary insomnia      busPIRone (BUSPAR) 10 mg tablet Take 2 Tablets by mouth three (3) times daily. Qty: 180 Tablet, Refills: 4    Associated Diagnoses: Generalized anxiety disorder      hyoscyamine (ANASPAZ, LEVSIN) 0.125 mg tablet hyoscyamine sulfate 0.125 mg tablet   TAKE 1 TABLET BY MOUTH THREE TIMES DAILY AS NEEDED FOR CRAMPING OR DIARRHEA      losartan (COZAAR) 25 mg tablet Take 25 mg by mouth daily. Indications: high blood pressure      pravastatin (PRAVACHOL) 80 mg tablet Take 80 mg by mouth nightly. melatonin 5 mg tablet Take 10 mg by mouth nightly. fenofibric acid (TRILIPIX ER) 135 mg capsule Take 135 mg by mouth daily. cholecalciferol, vitamin D3, (VITAMIN D3 PO) Take 1 Tab by mouth daily. potassium chloride SR (KLOR-CON 10) 10 mEq tablet Take 10 mEq by mouth daily as needed for Other (take when taking Lasix). metoprolol tartrate (LOPRESSOR) 25 mg tablet Take 12.5 mg by mouth every twelve (12) hours. donepezil (ARICEPT) 10 mg tablet Take 10 mg by mouth nightly. ascorbic acid, vitamin C, (VITAMIN C) 500 mg tablet Take 1 Tab by mouth daily. celecoxib (CELEBREX) 100 mg capsule Take 100 mg by mouth two (2) times a day. cyanocobalamin (VITAMIN B12) 500 mcg tablet Take 1 Tab by mouth daily.       ferrous sulfate 325 mg (65 mg iron) tablet Take 1 Tab by mouth Three (3) times a week.      levothyroxine (SYNTHROID) 75 mcg tablet Take 75 mcg by mouth Daily (before breakfast). miscellaneous medical supply misc Diabetic shoes      nitroglycerin (NITROSTAT) 0.4 mg SL tablet 0.4 mg by SubLINGual route every five (5) minutes as needed for Chest Pain. Max of 3 doses. Call 911 if no relief after 3 doses. STOP taking these medications       HYDROcodone-acetaminophen (Norco) 7.5-325 mg per tablet Comments:   Reason for Stopping:         NARCAN 4 mg/actuation nasal spray Comments:   Reason for Stopping:         glucose blood VI test strips (ASCENSIA AUTODISC VI, ONE TOUCH ULTRA TEST VI) strip Comments:   Reason for Stopping:         lancets 30 gauge misc Comments:   Reason for Stopping:                 Disposition: Rehab  Activity: As per physical therapy  Diet: ADULT ORAL NUTRITION SUPPLEMENT Lunch; Diabetic Supplement  ADULT DIET Regular; 4 carb choices (60 gm/meal); Low Sodium (2 gm)    Follow-up Appointments   Procedures    FOLLOW UP VISIT Appointment in: One Week Cbc,bmp and magnesium in a week, nurisng home physician to follow up on labs. Follow up with pcp in a week after discharge from rehab. Look for any signs of infection rt shoulder- recent surgery. Pt has brissa. .. Cbc[facility]bmp and magnesium [facility]. Follow up with pcp in a week after discharge from rehab. Look for any signs of infection rt shoulder- recent surgery. Pt has appointment with orthopedic on 11/10/2021. Lasix 20 mg only if weight increase greater then 2 lb/day or 5 lb/week. Pt is discharged on oxygen  2 lit/min nasal cannula. Standing Status:   Standing     Number of Occurrences:   1     Order Specific Question:   Appointment in     Answer:    One Week         Follow-up Information     Follow up With Specialties Details Why [address] ([place]) Santino Arnold, Rehabilitation   [address] [facility]  [phone]    Bshsi, Not On File, MD Orthopedic Surgery   Not On File (58) Patient has a PCP but that physician is not listed in [facility]. Sharita Lala MD Orthopedic Surgery  November 10th at 3:15 PM  [address] [facility] [address] [facility]  [address]  [phone]      Bshsi, Not On File, MD Orthopedic Surgery   Not On File (62) Patient has a PCP but that physician is not listed in [facility]. Chi Emery MD Family Medicine  Tuesday November 9th at 10:00am  [phone]            Time spent in patient discharge planning and coordination 35 minutes.     Signed:  Denis Ragsdale MD

## 2021-11-02 NOTE — PROGRESS NOTES
Care Management Interventions  PCP Verified by CM: Yes  Mode of Transport at Discharge: Self  Transition of Care Consult (CM Consult): [address]: No  Reason Outside [place]: Patient already serviced by other home [id number]: Child(clari)  Confirm Follow Up Transport: Family  The Plan for Transition of Care is Related to the Following Treatment Goals : PT/OT/RN  The Patient and/or Patient Representative was Provided with a Choice of Provider and Agrees with the Discharge Plan?: Yes  Name of the Patient Representative Who was Provided with a Choice of Provider and Agrees with the Discharge Plan: Yunior Espinoza  [employer] of Choice List was Provided with Basic Dialogue that Supports the Patient's Individualized Plan of Care/Goals, Treatment Preferences and Shares the Quality Data Associated with the Providers?: Yes  Discharge Location  Discharge Placement: Home with home health    Patient accepted short term rehab bed at [facility]. They can accept today Tues 11-2 if medically ready. PPD placed yesterday but waiver still in effect and they will accept a neg rapid if PCR not resulted.

## 2021-11-11 NOTE — PROGRESS NOTES
Physician Progress Note      PATIENTCospike Tim  CSN #:                  [id number]  :                       1945  ADMIT DATE:       10/29/2021 6:49 PM  [address] DATE:        2021 12:36 PM  RESPONDING  PROVIDER #:        Marie Beyer MD          QUERY TEXT:    Pt admitted with acute respiratory failure and has CHF documented. If possible, please document in progress notes and discharge summary further specificity regarding the type and acuity of CHF:    The medical record reflects the following:  Risk Factors: hx diastolic CHF  Clinical Indicators:  -H&P states \"pulmonary edema/fluid overload, possibly postoperative. Potentially a mild CHF exacerbation. \"  -DCS states \"Pt was admitted  acute hypoxic respiratory failure. Patient proBNP was 1502. Chest x-ray showed pulmonary edema/fluid overload. Patient does have history of diastolic heart failure. Says does not use Lasix on a regular basis, normally uses as needed and it has been few years before she had to use any Lasix. So total around 160 mg of Lasix, patient developed kidney failure, her Lasix was discontinued and actually started on IV fluids. \"  Treatment: IV Lasix  Options provided:  -- Acute on Chronic Diastolic CHF/HFpEF  -- Acute Diastolic CHF/HFpEF  -- Chronic Diastolic CHF/HFpEF  -- Other - I will add my own diagnosis  -- Disagree - Not applicable / Not valid  -- Disagree - Clinically unable to determine / Unknown  -- Refer to Clinical Documentation Reviewer    PROVIDER RESPONSE TEXT:    This patient is in acute on chronic diastolic CHF/HFpEF.     Query created by: Kameron Ybarra on 2021 12:16 PM      Electronically signed by:  Marie Beyer MD 11/10/2021 10:59 PM

## 2022-03-18 PROBLEM — Z79.899 CHRONIC PRESCRIPTION BENZODIAZEPINE USE: Status: ACTIVE | Noted: 2021-10-28

## 2022-03-18 PROBLEM — N17.9 AKI (ACUTE KIDNEY INJURY) (HCC): Status: ACTIVE | Noted: 2021-10-31

## 2022-03-18 PROBLEM — F33.41 RECURRENT MAJOR DEPRESSIVE DISORDER, IN PARTIAL REMISSION (HCC): Status: ACTIVE | Noted: 2018-02-25

## 2022-03-19 PROBLEM — Z98.890 HISTORY OF SHOULDER SURGERY: Status: ACTIVE | Noted: 2021-10-29

## 2022-03-19 PROBLEM — F02.80 LATE ONSET ALZHEIMER DEMENTIA (HCC): Status: ACTIVE | Noted: 2021-10-29

## 2022-03-19 PROBLEM — G30.1 LATE ONSET ALZHEIMER DEMENTIA (HCC): Status: ACTIVE | Noted: 2021-10-29

## 2022-03-19 PROBLEM — J96.01 ACUTE HYPOXEMIC RESPIRATORY FAILURE (HCC): Status: ACTIVE | Noted: 2021-10-29

## 2022-03-19 PROBLEM — I27.20 PULMONARY HYPERTENSION (HCC): Status: ACTIVE | Noted: 2021-10-28

## 2022-03-19 PROBLEM — I50.32 CHRONIC DIASTOLIC CHF (CONGESTIVE HEART FAILURE) (HCC): Status: ACTIVE | Noted: 2021-10-25

## 2022-03-20 PROBLEM — E83.42 HYPOMAGNESEMIA: Status: ACTIVE | Noted: 2021-10-30

## 2022-03-20 PROBLEM — G62.9 NEUROPATHY: Status: ACTIVE | Noted: 2021-11-02

## 2022-08-17 ENCOUNTER — TELEMEDICINE (OUTPATIENT)
Dept: BEHAVIORAL/MENTAL HEALTH CLINIC | Age: 77
End: 2022-08-17

## 2022-08-17 DIAGNOSIS — Z79.899 HIGH RISK MEDICATIONS (NOT ANTICOAGULANTS) LONG-TERM USE: ICD-10-CM

## 2022-08-17 DIAGNOSIS — F41.1 GENERALIZED ANXIETY DISORDER: ICD-10-CM

## 2022-08-17 DIAGNOSIS — G89.4 CHRONIC PAIN DISORDER: ICD-10-CM

## 2022-08-17 DIAGNOSIS — F51.01 PRIMARY INSOMNIA: ICD-10-CM

## 2022-08-17 DIAGNOSIS — F33.42 RECURRENT MAJOR DEPRESSIVE DISORDER, IN FULL REMISSION (HCC): Primary | ICD-10-CM

## 2022-08-17 PROCEDURE — 99214 OFFICE O/P EST MOD 30 MIN: CPT | Performed by: PSYCHIATRY & NEUROLOGY

## 2022-08-17 PROCEDURE — 1123F ACP DISCUSS/DSCN MKR DOCD: CPT | Performed by: PSYCHIATRY & NEUROLOGY

## 2022-08-17 RX ORDER — BUSPIRONE HYDROCHLORIDE 10 MG/1
20 TABLET ORAL 3 TIMES DAILY
Qty: 180 TABLET | Refills: 2 | Status: SHIPPED | OUTPATIENT
Start: 2022-08-17

## 2022-08-17 RX ORDER — AMITRIPTYLINE HYDROCHLORIDE 25 MG/1
TABLET, FILM COATED ORAL
Qty: 30 TABLET | Refills: 2 | Status: SHIPPED | OUTPATIENT
Start: 2022-08-17

## 2022-08-17 RX ORDER — ALPRAZOLAM 0.25 MG/1
0.25 TABLET ORAL DAILY PRN
Qty: 30 TABLET | Refills: 2 | Status: SHIPPED | OUTPATIENT
Start: 2022-08-17 | End: 2022-11-15

## 2022-08-17 RX ORDER — FLUOXETINE HYDROCHLORIDE 20 MG/1
20 CAPSULE ORAL DAILY
Qty: 30 CAPSULE | Refills: 2 | Status: SHIPPED | OUTPATIENT
Start: 2022-08-17

## 2022-08-17 RX ORDER — MIRTAZAPINE 30 MG/1
TABLET, FILM COATED ORAL
Qty: 30 TABLET | Refills: 2 | Status: SHIPPED | OUTPATIENT
Start: 2022-08-17

## 2022-08-17 ASSESSMENT — ANXIETY QUESTIONNAIRES
5. BEING SO RESTLESS THAT IT IS HARD TO SIT STILL: 0
GAD7 TOTAL SCORE: 5
3. WORRYING TOO MUCH ABOUT DIFFERENT THINGS: 1
IF YOU CHECKED OFF ANY PROBLEMS ON THIS QUESTIONNAIRE, HOW DIFFICULT HAVE THESE PROBLEMS MADE IT FOR YOU TO DO YOUR WORK, TAKE CARE OF THINGS AT HOME, OR GET ALONG WITH OTHER PEOPLE: NOT DIFFICULT AT ALL
1. FEELING NERVOUS, ANXIOUS, OR ON EDGE: 3
4. TROUBLE RELAXING: 1
6. BECOMING EASILY ANNOYED OR IRRITABLE: 0
2. NOT BEING ABLE TO STOP OR CONTROL WORRYING: 0
7. FEELING AFRAID AS IF SOMETHING AWFUL MIGHT HAPPEN: 0

## 2022-08-17 ASSESSMENT — PATIENT HEALTH QUESTIONNAIRE - PHQ9
SUM OF ALL RESPONSES TO PHQ QUESTIONS 1-9: 4
4. FEELING TIRED OR HAVING LITTLE ENERGY: 1
3. TROUBLE FALLING OR STAYING ASLEEP: 3
1. LITTLE INTEREST OR PLEASURE IN DOING THINGS: 0
SUM OF ALL RESPONSES TO PHQ QUESTIONS 1-9: 4
SUM OF ALL RESPONSES TO PHQ9 QUESTIONS 1 & 2: 0
6. FEELING BAD ABOUT YOURSELF - OR THAT YOU ARE A FAILURE OR HAVE LET YOURSELF OR YOUR FAMILY DOWN: 0
10. IF YOU CHECKED OFF ANY PROBLEMS, HOW DIFFICULT HAVE THESE PROBLEMS MADE IT FOR YOU TO DO YOUR WORK, TAKE CARE OF THINGS AT HOME, OR GET ALONG WITH OTHER PEOPLE: 1
2. FEELING DOWN, DEPRESSED OR HOPELESS: 0
SUM OF ALL RESPONSES TO PHQ QUESTIONS 1-9: 4
9. THOUGHTS THAT YOU WOULD BE BETTER OFF DEAD, OR OF HURTING YOURSELF: 0
SUM OF ALL RESPONSES TO PHQ QUESTIONS 1-9: 4
5. POOR APPETITE OR OVEREATING: 0
8. MOVING OR SPEAKING SO SLOWLY THAT OTHER PEOPLE COULD HAVE NOTICED. OR THE OPPOSITE, BEING SO FIGETY OR RESTLESS THAT YOU HAVE BEEN MOVING AROUND A LOT MORE THAN USUAL: 0
7. TROUBLE CONCENTRATING ON THINGS, SUCH AS READING THE NEWSPAPER OR WATCHING TELEVISION: 0

## 2022-08-17 NOTE — PROGRESS NOTES
Patient:  Keven Alexis  Age:  65 y.o.  :  1945     SEX:  female MRN:  [id number]     RACE: White (non-)     SEEN:  [x]  PATIENT  []  SPOUSE []  OTHER:              PHQ-9  2022 2022 10/18/2021   Little interest or pleasure in doing things 0 - -   Little interest or pleasure in doing things - 3 1   Feeling down, depressed, or hopeless 0 - -   Trouble falling or staying asleep, or sleeping too much 3 - -   Trouble falling or staying asleep, or sleeping too much - 0 3   Feeling tired or having little energy 1 - -   Feeling tired or having little energy - 0 3   Poor appetite or overeating 0 - -   Poor appetite, weight loss, or overeating - 3 1   Feeling bad about yourself - or that you are a failure or have let yourself or your family down 0 - -   Feeling bad about yourself - or that you are a failure or have let yourself or your family down - 0 0   Trouble concentrating on things, such as reading the newspaper or watching television 0 - -   Trouble concentrating on things such as school, work, reading, or watching TV - 0 0   Moving or speaking so slowly that other people could have noticed. Or the opposite - being so fidgety or restless that you have been moving around a lot more than usual 0 - -   Moving or speaking so slowly that other people could have noticed; or the opposite being so fidgety that others notice - 3 0   Thoughts that you would be better off dead, or of hurting yourself in some way 0 - -   Thoughts of being better off dead, or hurting yourself in some way - 0 0   PHQ-2 Score 0 - -   Total Score PHQ 2 - 6 1   PHQ-9 Total Score 4 - -   PHQ 9 Score - 12 8   If you checked off any problems, how difficult have these problems made it for you to do your work, take care of things at home, or get along with other people?  1 - -   How difficult have these problems made it for you to do your work, take care of your home and get along with others - Somewhat difficult Not difficult at all       DENG-7 SCREENING 8/17/2022 10/18/2021   Feeling nervous, anxious, or on edge Nearly every day -   Not being able to stop or control worrying Not at all -   Worrying too much about different things Several days -   Trouble relaxing Several days -   Being so restless that it is hard to sit still Not at all -   Becoming easily annoyed or irritable Not at all -   Feeling afraid as if something awful might happen Not at all -   DENG-7 Total Score 5 -   How difficult have these problems made it for you to do your work, take care of things at home, or get along with other people? Not difficult at all Somewhat difficult   Feeling nervous, anxious, or on edge - Nearly every day   DENG-7 Total Score - 9        I was at home while conducting this encounter. Consent:  She and/or her healthcare decision maker is aware that this patient-initiated Telehealth encounter is a billable service, with coverage as determined by her insurance carrier. She is aware that she may receive a bill and has provided verbal consent to proceed: YesPatient identification was verified, and a caregiver was present when appropriate. The patient was located in a state where the provider was credentialed to provide care. This virtual visit was conducted via [employer] Pursuant to the emergency declaration under the [facility], [facility] waiver authority and the [employer] Act, this Virtual  Visit was conducted to reduce the patient's risk of exposure to COVID-19 and provide continuity of care for an established patient. Services were provided through a video synchronous discussion virtually to substitute for in-person clinic visit. Due to this being a TeleHealth evaluation, many elements of the physical examination are unable to be assessed. Total Time: minutes: 11-20 minutes.     Chief complaint:  Pt says she has been doing well on the current medications except not sleeping well. Subjective:  Seen virtually for follow-up. States has been doing well on the current medications. Son hurt his back and is out of work now. He draws short-term disability and is still helping with the bills. She has been 24 hours a day. Cannot relax. Not sleeping well. Pain has something to do with her not sleeping well. Remeron does not seem to be working well. We will taper down the dose of Remeron to 15 mg daily and add amitriptyline for sleep maintenance. Amitriptyline will also help her with pain. Patient denies any heart problems. Amitriptyline to be titrated up to 25 mg daily at bedtime. Cross-taper explained. Supportive psychotherapy provided. She denies suicidal ideation/homicidal ideations. Denies symptoms of psychosis. Patient Active Problem List   Diagnosis    Recurrent major depressive disorder, in partial remission (HCC)    Chronic prescription benzodiazepine use    Chronic back pain    ALLEN (acute kidney injury) ([address]    Generalized anxiety disorder    Chronic diastolic CHF (congestive heart failure) ([facility]    Late onset Alzheimer dementia ([facility] [address]    Pulmonary hypertension ([address]    Essential hypertension    Acute hypoxemic respiratory failure ([address]    History of shoulder surgery    Hypomagnesemia    Neuropathy    Dementia with behavioral disturbance (HCC)    History of atrial fibrillation       Denies palpitation,SOB, Chest pain, headaches. In no acute distress. MEDICATION REVIEW:    Current Medications:    Current Outpatient Medications   Medication Sig    ALPRAZolam (XANAX) 0.25 MG tablet Take 1 tablet by mouth daily as needed for Anxiety for up to 90 days.     busPIRone (BUSPAR) 10 MG tablet Take 2 tablets by mouth 3 times daily    FLUoxetine (PROZAC) 20 MG capsule Take 1 capsule by mouth daily    mirtazapine (REMERON) 30 MG tablet Take 30 mg once daily at bedtime for 1 week then 15 mg once daily at bedtime    amitriptyline (ELAVIL) 25 MG tablet Take half tablet once daily at bedtime for 1 week then one tablet once daily at bedtime. ascorbic acid (VITAMIN C) 500 MG tablet Take 1 tablet by mouth daily    celecoxib (CELEBREX) 100 MG capsule Take 100 mg by mouth 2 times daily    donepezil (ARICEPT) 10 MG tablet Take 10 mg by mouth    gabapentin (NEURONTIN) 800 MG tablet Take 800 mg by mouth 3 times daily. HYDROcodone-acetaminophen (NORCO) 7.5-325 MG per tablet TAKE 1 TABLET BY MOUTH THREE TIMES DAILY AS NEEDED FOR PAIN    levothyroxine (SYNTHROID) 75 MCG tablet Take 75 mcg by mouth every morning (before breakfast)    losartan (COZAAR) 25 MG tablet Take 25 mg by mouth daily    melatonin 5 MG TABS tablet Take 10 mg by mouth    metoprolol tartrate (LOPRESSOR) 25 MG tablet Take 12.5 mg by mouth every 12 hours    nitroGLYCERIN (NITROSTAT) 0.4 MG SL tablet Place 0.4 mg under the tongue    pravastatin (PRAVACHOL) 80 MG tablet Take 80 mg by mouth    choline fenofibrate (TRILIPIX) 135 MG CPDR delayed release capsule Take 135 mg by mouth daily (Patient not taking: Reported on 8/17/2022)    cyanocobalamin 500 MCG tablet Take 1 tablet by mouth daily (Patient not taking: Reported on 8/17/2022)    ferrous sulfate (IRON 325) 325 (65 Fe) MG tablet Take 1 tablet by mouth three times a week (Patient not taking: Reported on 8/17/2022)    furosemide (LASIX) 20 MG tablet Take 20 mg by mouth daily as needed (Patient not taking: Reported on 8/17/2022)    hyoscyamine (ANASPAZ;LEVSIN) 125 MCG tablet Hyoscyamine sulfate 0.125 mg tablet TAKE 1 TABLET BY MOUTH THREE TIMES DAILY AS NEEDED FOR CRAMPING OR DIARRHEA (Patient not taking: Reported on 8/17/2022)    potassium chloride (KLOR-CON) 10 MEQ extended release tablet Take 10 mEq by mouth daily as needed (Patient not taking: Reported on 8/17/2022)     No current facility-administered medications for this visit.        Allergies   Allergen Reactions    Morphine Nausea Only    Oxycodone-Acetaminophen Nausea Only Oxycodone Nausea And Vomiting       Past Medical History, Past Surgical History, Family history, Social History, and Medications were all reviewed with the patient today and updated as necessary.      Compliant with medication: Yes   Side effects from medications:  No     EXAMINATION  Musculoskeletal    GAIT AND STATION   [] WNL   [x] RESTRICTED   [] UNSTEADY WALK        [] ABNORMAL   [] UNBALANCED         PSYCHIATRIC     GENERAL APPEARANCE:   [x]  WELL GROOMED []     DISHEVELED   []  UNKEMPT      []  UNUSUAL/BIZZARE    [] WNL       ATTITUDE:   [x] COOPERATIVE   [] GUARDED   [] SUSPICIOUS      [] HOSTILE                            BEHAVIOR:   [x] CALM   [] HYPERACTIVE   [] MANNERISMS      [] BIZZARE         SPEECH:   [x] NORMAL FOR CLIENT   [] SPONTANEOUS   [] SLURRED   [] WHISPERING      [] LOUD   [] PRESSURED   [] ARTICULATE       EYE CONTACT:   [x] WNL   [] BLANK STARE   [] INTENSE      [] AVOIDANT         MOOD:   [x] EUTHYMIC   [] ANXIOUS   [] DEPRESSED      [] IRRITABLE   [] ANGRY   [] APATHETIC     AFFECT:   [x] CONGRUENT WITH MOOD   [] FLAT   [] CONSTRICTED      [] INAPPROPRIATE   [] LABILE           THOUGHT PROCESS:   [x] LOGICAL/GOAL-DIRECTED   [] FOI   [] CIRCUMSANTIAL      [] INCOHERENT   [] TANGENTIAL   [] CONCRETE      [] PERSEVERATION           THOUGHT CONTENT:                DELUSIONS  [x] DENIES  [] GRANDIOSE  [] PERSECUTORY  [] Sabianism  [] REFERENCE   HALLUCINATIONS  [x] DENIES  [] AUDITORY  [] VISUAL  [] OLFACTORY  [] TACTILE     [] GUSTATORY  [] SOMATIC         OBSESSIONS  [x] DENIES  [] PRESENT         SUICIDAL IDEATION  [x] DENIES  [] PRESENT W/O PLAN  [] PRESENT W/ PLAN       HOMICIDAL IDEATION  [x] DENIES  [] PRESENT W/O PLAN  [] PRESENT W/ PLAN           JUDGEMENT:   [x] GOOD   [] FAIR   [] POOR   INSIGHT:   [x] GOOD   [] FAIR   [] POOR     COGNITION:           SENSORIUM:   [x] ALERT   [] CLOUDED   [] DROWSY     ORIENTATION:   [x] INTACT   [] TIME:  PLACE  PERSON   RECENT & REMOTE MEMORY: [] NORMAL   [x] OTHER:                  ATTENTION:   [x] INTACT   [] MILD IMPAIRMENT   [] SEVERE IMPAIRMENT     CONCENTRATION:   [x] INTACT   [] MILD IMPAIRMENT   [] SEVERE IMPAIRMENT     LANGUAGE:   [x] AVERAGE   [] ABOVE AVERAGE   [] BELOW AVERAGE     FUND OF KNOWLEDGE:   [] UNABLE TO ASSESS AT THIS TIME   [x] AVERAGE   [] ABOVE AVERAGE   [] BELOW AVERAGE      [] GOOD TO EXCELLENT KNOWLEDGE OF CURRENT EVENTS   [] POOR TO NO KNLEDGE OF CURRENT EVENTS           ABNORMAL MOVEMENTS:   [x] NONE   [] TICS   [] TREMORS   [] BIZZARE      [] FACE   [] TRUNK   [] EXTREMETIES   [] GESTURES        SLEEP:   [] GOOD   [] FAIR   [x] POOR      MUSCLE STRENGTH AND TONE   [x] WNL   [] ATROPHY   [] SPASTIC        [] FLACCID   [] COGWHEEL         Diagnoses/Impressions:    ICD-10-CM    1. Recurrent major depressive disorder, in full remission ([facility]  F33.42       2. Generalized anxiety disorder  F41.1 ALPRAZolam (XANAX) 0.25 MG tablet      3. Primary insomnia  F51.01       4. Chronic pain disorder  G89.4       5.  High risk medications (not anticoagulants) long-term use  Z79.899           TREATMENT GOALS:  Symptom reduction, Medication adherence, maintain therapeutic gains    LABS/IMAGING:    []  Ordered [x]  Reviewed []  New Labs Ordered:     LAB  WBC   Date/Time Value Ref Range Status   10/29/2021 07:16 PM 12.3 (H) 4.3 - 11.1 K/uL Final     Hemoglobin   Date/Time Value Ref Range Status   10/29/2021 07:16 PM 10.1 (L) 11.7 - 15.4 g/dL Final     Hematocrit   Date/Time Value Ref Range Status   10/29/2021 07:16 PM 32.1 (L) 35.8 - 46.3 % Final     Platelets   Date/Time Value Ref Range Status   10/29/2021 07:16  150 - 450 K/uL Final     Sodium   Date/Time Value Ref Range Status   11/02/2021 05:54  136 - 145 mmol/L Final     Potassium   Date/Time Value Ref Range Status   11/02/2021 05:54 AM 3.9 3.5 - 5.1 mmol/L Final     Chloride   Date/Time Value Ref Range Status   11/02/2021 05:54  98 - 107 mmol/L Final     CO2 Date/Time Value Ref Range Status   11/02/2021 05:54 AM 35 (H) 21 - 32 mmol/L Final     BUN   Date/Time Value Ref Range Status   11/02/2021 05:54 AM 27 (H) 8 - 23 MG/DL Final     Magnesium   Date/Time Value Ref Range Status   10/31/2021 03:56 AM 2.7 (H) 1.8 - 2.4 mg/dL Final       Please refer to the lab tab in the epic and care everywhere for the most recent lab results. Plan:     [x]  Medication ordered: Xanax, mirtazapine, amitriptyline, BuSpar, Prozac to target depression, anxiety, insomnia. [x]  Medication education/counseling provided  Medication dosage and time to take, purpose/expected benefits/risks, common side effects, lab monitoring required and reason, expected length of treatment, risk of no treatment, effects on pregnancy/nursing, financial availability. Educated patient on  side effects/risks/benefits of meds including cardiac arrhythmias, suicidal ideations, orthostatic hypotension, serotonin syndrome, risk of steffen/hypomania from antidepressants, withdrawals from abrupt discontinuation of meds, anticholinergic side effects, risk of bleeding, risk of seizures, addiction potential, memory impairment with long term use of benzos, respiratory depression, high blood pressure, dizziness, drowsiness, sedation , risk of falls, Risk & benefits discussed: including but not limited to possible off-label medication usage. [x] Follow MSE for sxs improvement     I have reviewed the patients controlled substance prescription history, as maintained in the [place] prescription monitoring program, so that the prescription(s) for a  controlled substance can be given. Recommendations and Referrals: Follow up with : MD, requires monitoring of response to medication, requires monitoring of medication side effects.     Time until next PMA:     Follow up with Mental Health Clinicians: psychotherapy interventions, improve level of functioning, monitoring to prevent decompensation /hospitalization, monitoring to maintain therapeutic gains, symptoms (resolving and controlled)           Psychotherapy note:                                __10_ Minutes of psychotherapy     [x]  Supportive psychotherapy, Patient discussed certain situational and personal stressors ongoing in her life at this time, weight management d/w the patient. Sleep hygiene d/w patient. Patient allowed to vent out her emotions. Scenarios were reviewed using role playing and CBT techniques in order to increase insight and decrease anxiety. []  Disposition planning      []  Dangerous and will not contract for safety in the community    **Pateint has been notified: They are to call 911 or go to their nearest E.R. if they are experiencing a medical emergency or suicidal ideations/homicidal ideations. **  All ancillary documentation entered reviewed by provider. PLEASE NOTE:  This document has been produced in part or whole using voice recognition software. Proofread however unrecognized errors in transcription may be present.         Brooklyn Wall MD

## 2022-11-09 ENCOUNTER — TELEMEDICINE (OUTPATIENT)
Dept: BEHAVIORAL/MENTAL HEALTH CLINIC | Age: 77
End: 2022-11-09

## 2022-11-09 DIAGNOSIS — F51.01 PRIMARY INSOMNIA: ICD-10-CM

## 2022-11-09 DIAGNOSIS — Z79.899 HIGH RISK MEDICATIONS (NOT ANTICOAGULANTS) LONG-TERM USE: ICD-10-CM

## 2022-11-09 DIAGNOSIS — F33.42 RECURRENT MAJOR DEPRESSIVE DISORDER, IN FULL REMISSION (HCC): Primary | ICD-10-CM

## 2022-11-09 DIAGNOSIS — G89.4 CHRONIC PAIN DISORDER: ICD-10-CM

## 2022-11-09 DIAGNOSIS — F41.1 GENERALIZED ANXIETY DISORDER: ICD-10-CM

## 2022-11-09 PROCEDURE — 99442 PR PHYS/QHP TELEPHONE EVALUATION 11-20 MIN: CPT | Performed by: PSYCHIATRY & NEUROLOGY

## 2022-11-09 RX ORDER — FLUOXETINE HYDROCHLORIDE 20 MG/1
20 CAPSULE ORAL EVERY MORNING
Qty: 30 CAPSULE | Refills: 3 | Status: SHIPPED | OUTPATIENT
Start: 2022-11-09

## 2022-11-09 RX ORDER — AMITRIPTYLINE HYDROCHLORIDE 25 MG/1
25 TABLET, FILM COATED ORAL NIGHTLY
Qty: 30 TABLET | Refills: 3 | Status: SHIPPED | OUTPATIENT
Start: 2022-11-09

## 2022-11-09 RX ORDER — ALPRAZOLAM 0.25 MG/1
0.25 TABLET ORAL DAILY PRN
Qty: 30 TABLET | Refills: 3 | Status: SHIPPED | OUTPATIENT
Start: 2022-11-09 | End: 2023-02-07

## 2022-11-09 RX ORDER — BUSPIRONE HYDROCHLORIDE 10 MG/1
20 TABLET ORAL 3 TIMES DAILY
Qty: 180 TABLET | Refills: 2 | Status: SHIPPED | OUTPATIENT
Start: 2022-11-09

## 2022-11-09 RX ORDER — MIRTAZAPINE 15 MG/1
15 TABLET, FILM COATED ORAL NIGHTLY
Qty: 30 TABLET | Refills: 3 | Status: SHIPPED | OUTPATIENT
Start: 2022-11-09

## 2022-11-09 ASSESSMENT — ANXIETY QUESTIONNAIRES
GAD7 TOTAL SCORE: 2
7. FEELING AFRAID AS IF SOMETHING AWFUL MIGHT HAPPEN: 0
5. BEING SO RESTLESS THAT IT IS HARD TO SIT STILL: 0
3. WORRYING TOO MUCH ABOUT DIFFERENT THINGS: 1
4. TROUBLE RELAXING: 0
2. NOT BEING ABLE TO STOP OR CONTROL WORRYING: 1
IF YOU CHECKED OFF ANY PROBLEMS ON THIS QUESTIONNAIRE, HOW DIFFICULT HAVE THESE PROBLEMS MADE IT FOR YOU TO DO YOUR WORK, TAKE CARE OF THINGS AT HOME, OR GET ALONG WITH OTHER PEOPLE: NOT DIFFICULT AT ALL
1. FEELING NERVOUS, ANXIOUS, OR ON EDGE: 0
6. BECOMING EASILY ANNOYED OR IRRITABLE: 0

## 2022-11-09 ASSESSMENT — PATIENT HEALTH QUESTIONNAIRE - PHQ9
1. LITTLE INTEREST OR PLEASURE IN DOING THINGS: 0
SUM OF ALL RESPONSES TO PHQ QUESTIONS 1-9: 1
5. POOR APPETITE OR OVEREATING: 0
10. IF YOU CHECKED OFF ANY PROBLEMS, HOW DIFFICULT HAVE THESE PROBLEMS MADE IT FOR YOU TO DO YOUR WORK, TAKE CARE OF THINGS AT HOME, OR GET ALONG WITH OTHER PEOPLE: 0
SUM OF ALL RESPONSES TO PHQ QUESTIONS 1-9: 1
6. FEELING BAD ABOUT YOURSELF - OR THAT YOU ARE A FAILURE OR HAVE LET YOURSELF OR YOUR FAMILY DOWN: 0
SUM OF ALL RESPONSES TO PHQ QUESTIONS 1-9: 1
SUM OF ALL RESPONSES TO PHQ9 QUESTIONS 1 & 2: 0
7. TROUBLE CONCENTRATING ON THINGS, SUCH AS READING THE NEWSPAPER OR WATCHING TELEVISION: 0
2. FEELING DOWN, DEPRESSED OR HOPELESS: 0
SUM OF ALL RESPONSES TO PHQ QUESTIONS 1-9: 1
8. MOVING OR SPEAKING SO SLOWLY THAT OTHER PEOPLE COULD HAVE NOTICED. OR THE OPPOSITE, BEING SO FIGETY OR RESTLESS THAT YOU HAVE BEEN MOVING AROUND A LOT MORE THAN USUAL: 0
3. TROUBLE FALLING OR STAYING ASLEEP: 1
4. FEELING TIRED OR HAVING LITTLE ENERGY: 0
9. THOUGHTS THAT YOU WOULD BE BETTER OFF DEAD, OR OF HURTING YOURSELF: 0

## 2022-11-09 NOTE — PROGRESS NOTES
Patient:  Sanket Cyr  Age:  65 y.o.  :  1945     SEX:  female MRN:  [id number]     RACE: White (non-)     SEEN:  [x]  PATIENT  []  SPOUSE []  OTHER:              PHQ-9  2022   Little interest or pleasure in doing things 0 0 0   Little interest or pleasure in doing things - - -   Feeling down, depressed, or hopeless 0 0 0   Trouble falling or staying asleep, or sleeping too much 1 3 0   Trouble falling or staying asleep, or sleeping too much - - -   Feeling tired or having little energy 0 1 1   Feeling tired or having little energy - - -   Poor appetite or overeating 0 0 0   Poor appetite, weight loss, or overeating - - -   Feeling bad about yourself - or that you are a failure or have let yourself or your family down 0 0 0   Feeling bad about yourself - or that you are a failure or have let yourself or your family down - - -   Trouble concentrating on things, such as reading the newspaper or watching television 0 0 0   Trouble concentrating on things such as school, work, reading, or watching TV - - -   Moving or speaking so slowly that other people could have noticed. Or the opposite - being so fidgety or restless that you have been moving around a lot more than usual 0 0 0   Moving or speaking so slowly that other people could have noticed; or the opposite being so fidgety that others notice - - -   Thoughts that you would be better off dead, or of hurting yourself in some way 0 0 -   Thoughts of being better off dead, or hurting yourself in some way - - -   PHQ-2 Score 0 0 0   Total Score PHQ 2 - - -   PHQ-9 Total Score 1 4 1   PHQ 9 Score - - -   If you checked off any problems, how difficult have these problems made it for you to do your work, take care of things at home, or get along with other people?  0 1 -   How difficult have these problems made it for you to do your work, take care of your home and get along with others - - Not difficult at all       DENG-7 SCREENING 11/9/2022 8/17/2022 10/18/2021   Feeling nervous, anxious, or on edge Not at all Nearly every day -   Not being able to stop or control worrying Several days Not at all -   Worrying too much about different things Several days Several days -   Trouble relaxing Not at all Several days -   Being so restless that it is hard to sit still Not at all Not at all -   Becoming easily annoyed or irritable Not at all Not at all -   Feeling afraid as if something awful might happen Not at all Not at all -   DENG-7 Total Score 2 5 -   How difficult have these problems made it for you to do your work, take care of things at home, or get along with other people? Not difficult at all Not difficult at all Somewhat difficult   Feeling nervous, anxious, or on edge - - Nearly every day   DENG-7 Total Score - - 9        I was at home while conducting this encounter. Consent:  She and/or her healthcare decision maker is aware that this patient-initiated Telehealth encounter is a billable service, with coverage as determined by her insurance carrier. She is aware that she may receive a bill and has provided verbal consent to proceed: YesPatient identification was verified, and a caregiver was present when appropriate. The patient was located in a state where the provider was credentialed to provide care. This virtual visit was conducted telephone encounter only. -  I affirm this is a Patient Initiated Episode with an Established Patient who has not had a related appointment within my department in the past 7 days or scheduled within the next 24 hours. Note: this encounter is not billable if this call serves to triage the patient into an appointment for the relevant concern. Total Time: minutes: 11-20 minutes. Chief complaint:  Pt says she is frustrated that her phone did not work for her virtual visit. Otherwise she is doing good.     Subjective:  Phone visit as patient was not able to connect virtually. States has been doing all right. Taking her medications as prescribed. Denies side effects. Sleeping better on the combination of amitriptyline and Remeron. Son lives with her and he helps as much as he can. He is on short-term disability for her back. Supportive psychotherapy provided. Patient denies suicidal ideation/homicidal ideations. Denies symptoms of psychosis. Patient Active Problem List   Diagnosis    Recurrent major depressive disorder, in partial remission (HCC)    Chronic prescription benzodiazepine use    Chronic back pain    ALLEN (acute kidney injury) ([facility] [address]    Generalized anxiety disorder    Chronic diastolic CHF (congestive heart failure) ([facility] [address]    Late onset Alzheimer dementia ([facility] [address]    Pulmonary hypertension ([facility] [address]    Essential hypertension    Acute hypoxemic respiratory failure ([facility] [address]    History of shoulder surgery    Hypomagnesemia    Neuropathy    Dementia with behavioral disturbance    History of atrial fibrillation       Denies palpitation,SOB, Chest pain, headaches. In no acute distress. MEDICATION REVIEW:    Current Medications:    Current Outpatient Medications   Medication Sig    ALPRAZolam (XANAX) 0.25 MG tablet Take 1 tablet by mouth daily as needed for Anxiety for up to 90 days.     busPIRone (BUSPAR) 10 MG tablet Take 2 tablets by mouth 3 times daily    FLUoxetine (PROZAC) 20 MG capsule Take 1 capsule by mouth every morning    mirtazapine (REMERON) 15 MG tablet Take 1 tablet by mouth nightly    amitriptyline (ELAVIL) 25 MG tablet Take 1 tablet by mouth nightly    ascorbic acid (VITAMIN C) 500 MG tablet Take 1 tablet by mouth daily    celecoxib (CELEBREX) 100 MG capsule Take 100 mg by mouth 2 times daily    choline fenofibrate (TRILIPIX) 135 MG CPDR delayed release capsule Take 135 mg by mouth daily    cyanocobalamin 500 MCG tablet Take 1 tablet by mouth daily    donepezil (ARICEPT) 10 MG tablet Take 10 mg by mouth    ferrous sulfate (IRON 325) 325 (65 Fe) MG tablet Take 1 tablet by mouth three times a week    furosemide (LASIX) 20 MG tablet Take 20 mg by mouth daily as needed    gabapentin (NEURONTIN) 800 MG tablet Take 800 mg by mouth 3 times daily. HYDROcodone-acetaminophen (NORCO) 7.5-325 MG per tablet TAKE 1 TABLET BY MOUTH THREE TIMES DAILY AS NEEDED FOR PAIN    hyoscyamine (ANASPAZ;LEVSIN) 125 MCG tablet Hyoscyamine sulfate 0.125 mg tablet TAKE 1 TABLET BY MOUTH THREE TIMES DAILY AS NEEDED FOR CRAMPING OR DIARRHEA    levothyroxine (SYNTHROID) 75 MCG tablet Take 75 mcg by mouth every morning (before breakfast)    losartan (COZAAR) 25 MG tablet Take 25 mg by mouth daily    melatonin 5 MG TABS tablet Take 10 mg by mouth    metoprolol tartrate (LOPRESSOR) 25 MG tablet Take 12.5 mg by mouth every 12 hours    nitroGLYCERIN (NITROSTAT) 0.4 MG SL tablet Place 0.4 mg under the tongue    potassium chloride (KLOR-CON) 10 MEQ extended release tablet Take 10 mEq by mouth daily as needed    pravastatin (PRAVACHOL) 80 MG tablet Take 80 mg by mouth     No current facility-administered medications for this visit. Allergies   Allergen Reactions    Fentanyl Nausea And Vomiting     Other reaction(s): Nausea and/or vomiting-Intolerance    Morphine Nausea Only    Oxycodone-Acetaminophen Nausea Only    Oxycodone Nausea And Vomiting       Past Medical History, Past Surgical History, Family history, Social History, and Medications were all reviewed with the patient today and updated as necessary.      Compliant with medication: Yes   Side effects from medications:  No     EXAMINATION  Musculoskeletal    GAIT AND STATION   [] WNL   [] RESTRICTED   [] UNSTEADY WALK        [] ABNORMAL   [] UNBALANCED         PSYCHIATRIC     GENERAL APPEARANCE:   []  WELL GROOMED []     DISHEVELED   []  UNKEMPT      []  UNUSUAL/BIZZARE    [] WNL       ATTITUDE:   [x] COOPERATIVE   [] GUARDED   [] SUSPICIOUS      [] HOSTILE                            BEHAVIOR:   [x] CALM   [] HYPERACTIVE   [] MANNERISMS      [] BIZZARE         SPEECH:   [x] NORMAL FOR CLIENT   [] SPONTANEOUS   [] SLURRED   [] WHISPERING      [] LOUD   [] PRESSURED   [] ARTICULATE       EYE CONTACT:   [] WNL   [] BLANK STARE   [] INTENSE      [] AVOIDANT         MOOD:   [x] EUTHYMIC   [] ANXIOUS   [] DEPRESSED      [] IRRITABLE   [] ANGRY   [] APATHETIC     AFFECT:   [] CONGRUENT WITH MOOD   [] FLAT   [] CONSTRICTED      [] INAPPROPRIATE   [] LABILE           THOUGHT PROCESS:   [x] LOGICAL/GOAL-DIRECTED   [] FOI   [] CIRCUMSANTIAL      [] INCOHERENT   [] TANGENTIAL   [] CONCRETE      [] PERSEVERATION           THOUGHT CONTENT:                DELUSIONS  [x] DENIES  [] GRANDIOSE  [] PERSECUTORY  [] Congregational  [] REFERENCE   HALLUCINATIONS  [x] DENIES  [] AUDITORY  [] VISUAL  [] OLFACTORY  [] TACTILE     [] GUSTATORY  [] SOMATIC         OBSESSIONS  [x] DENIES  [] PRESENT         SUICIDAL IDEATION  [x] DENIES  [] PRESENT W/O PLAN  [] PRESENT W/ PLAN       HOMICIDAL IDEATION  [x] DENIES  [] PRESENT W/O PLAN  [] PRESENT W/ PLAN           JUDGEMENT:   [x] GOOD   [] FAIR   [] POOR   INSIGHT:   [x] GOOD   [] FAIR   [] POOR     COGNITION:           SENSORIUM:   [x] ALERT   [] CLOUDED   [] DROWSY     ORIENTATION:   [x] INTACT   [] TIME:  PLACE  PERSON   RECENT & REMOTE MEMORY:   [] NORMAL   [x] OTHER:                  ATTENTION:   [x] INTACT   [] MILD IMPAIRMENT   [] SEVERE IMPAIRMENT     CONCENTRATION:   [x] INTACT   [] MILD IMPAIRMENT   [] SEVERE IMPAIRMENT     LANGUAGE:   [x] AVERAGE   [] ABOVE AVERAGE   [] BELOW AVERAGE     FUND OF KNOWLEDGE:   [] UNABLE TO ASSESS AT THIS TIME   [x] AVERAGE   [] ABOVE AVERAGE   [] BELOW AVERAGE      [] GOOD TO EXCELLENT KNOWLEDGE OF CURRENT EVENTS   [] POOR TO NO KNLEDGE OF CURRENT EVENTS           ABNORMAL MOVEMENTS:   [] NONE   [] TICS   [] TREMORS   [] BIZZARE      [] FACE   [] TRUNK   [] EXTREMETIES   [] GESTURES        SLEEP:   [] GOOD   [x] FAIR   [] POOR      MUSCLE STRENGTH AND TONE   [] WNL   [] ATROPHY [] SPASTIC        [] FLACCID   [] COGWHEEL         Diagnoses/Impressions:    ICD-10-CM    1. Recurrent major depressive disorder, in full remission ([facility] [address]  F33.42       2. Generalized anxiety disorder  F41.1 ALPRAZolam (XANAX) 0.25 MG tablet      3. Primary insomnia  F51.01       4. Chronic pain disorder  G89.4       5. High risk medications (not anticoagulants) long-term use  Z79.899           TREATMENT GOALS:  Symptom reduction, Medication adherence, maintain therapeutic gains    LABS/IMAGING:    []  Ordered [x]  Reviewed []  New Labs Ordered:     LAB  WBC   Date/Time Value Ref Range Status   10/29/2021 07:16 PM 12.3 (H) 4.3 - 11.1 K/uL Final     Hemoglobin   Date/Time Value Ref Range Status   10/29/2021 07:16 PM 10.1 (L) 11.7 - 15.4 g/dL Final     Hematocrit   Date/Time Value Ref Range Status   10/29/2021 07:16 PM 32.1 (L) 35.8 - 46.3 % Final     Platelets   Date/Time Value Ref Range Status   10/29/2021 07:16  150 - 450 K/uL Final     Sodium   Date/Time Value Ref Range Status   11/02/2021 05:54  136 - 145 mmol/L Final     Potassium   Date/Time Value Ref Range Status   11/02/2021 05:54 AM 3.9 3.5 - 5.1 mmol/L Final     Chloride   Date/Time Value Ref Range Status   11/02/2021 05:54  98 - 107 mmol/L Final     CO2   Date/Time Value Ref Range Status   11/02/2021 05:54 AM 35 (H) 21 - 32 mmol/L Final     BUN   Date/Time Value Ref Range Status   11/02/2021 05:54 AM 27 (H) 8 - 23 MG/DL Final     Magnesium   Date/Time Value Ref Range Status   10/31/2021 03:56 AM 2.7 (H) 1.8 - 2.4 mg/dL Final       Please refer to the lab tab in the epic and care everywhere for the most recent lab results. Plan:     [x]  Medication ordered: Xanax, amitriptyline, BuSpar, Prozac, mirtazapine to target depression, anxiety, insomnia.     [x]  Medication education/counseling provided  Medication dosage and time to take, purpose/expected benefits/risks, common side effects, lab monitoring required and reason, expected length of treatment, risk of no treatment, effects on pregnancy/nursing, financial availability. Educated patient on  side effects/risks/benefits of meds including  cardiac arrhythmias, suicidal ideations,orthostatic hypotension, serotonin syndrome, risk of steffen/hypomania from antidepressants, withdrawals from abrupt discontinuation of meds, anticholinergic side effects, risk of bleeding, risk of seizures, addiction potential, memory impairment with long term use of benzos, respiratory depression, high blood pressure, dizziness, drowsiness, sedation , risk of falls, Risk & benefits discussed: including but not limited to possible off-label medication usage. [x] Follow [facility]SE for sxs improvement     I have reviewed the patients controlled substance prescription history, as maintained in the [place] prescription monitoring program, so that the prescription(s) for a  controlled substance can be given. Recommendations and Referrals: Follow up with : MD, requires monitoring of response to medication, requires monitoring of medication side effects. Time until next PMA:     Follow up with Mental Health Clinicians: psychotherapy interventions, improve level of functioning, monitoring to prevent decompensation /hospitalization, monitoring to maintain therapeutic gains, symptoms (resolving and controlled)           Psychotherapy note:                                __10_ Minutes of psychotherapy     [x]  Supportive psychotherapy, Patient discussed certain situational and personal stressors ongoing in her life at this time, weight management d/w the patient. Sleep hygiene d/w patient. Patient allowed to vent out her emotions. Scenarios were reviewed using role playing and CBT techniques in order to increase insight and decrease anxiety. []  Disposition planning      []  Dangerous and will not contract for safety in the community    **Pateint has been notified:  They are to call 911 or go to their nearest E.R. if they are experiencing a medical emergency or suicidal ideations/homicidal ideations. **  All ancillary documentation entered reviewed by provider. PLEASE NOTE:  This document has been produced in part or whole using voice recognition software. Proofread however unrecognized errors in transcription may be present.         Pippa Castro MD

## 2023-03-01 ENCOUNTER — TELEMEDICINE (OUTPATIENT)
Dept: BEHAVIORAL/MENTAL HEALTH CLINIC | Age: 78
End: 2023-03-01
Payer: MEDICARE

## 2023-03-01 DIAGNOSIS — F41.1 GENERALIZED ANXIETY DISORDER: ICD-10-CM

## 2023-03-01 DIAGNOSIS — F51.01 PRIMARY INSOMNIA: ICD-10-CM

## 2023-03-01 DIAGNOSIS — F33.42 RECURRENT MAJOR DEPRESSIVE DISORDER, IN FULL REMISSION (HCC): Primary | ICD-10-CM

## 2023-03-01 DIAGNOSIS — G89.4 CHRONIC PAIN DISORDER: ICD-10-CM

## 2023-03-01 DIAGNOSIS — Z79.899 HIGH RISK MEDICATIONS (NOT ANTICOAGULANTS) LONG-TERM USE: ICD-10-CM

## 2023-03-01 PROCEDURE — 99442 PR PHYS/QHP TELEPHONE EVALUATION 11-20 MIN: CPT | Performed by: PSYCHIATRY & NEUROLOGY

## 2023-03-01 RX ORDER — ALPRAZOLAM 0.25 MG/1
0.25 TABLET ORAL DAILY PRN
Qty: 30 TABLET | Refills: 3 | Status: SHIPPED | OUTPATIENT
Start: 2023-03-01 | End: 2023-06-29

## 2023-03-01 RX ORDER — AMITRIPTYLINE HYDROCHLORIDE 25 MG/1
25 TABLET, FILM COATED ORAL NIGHTLY
Qty: 30 TABLET | Refills: 3 | Status: SHIPPED | OUTPATIENT
Start: 2023-03-01

## 2023-03-01 RX ORDER — MIRTAZAPINE 15 MG/1
15 TABLET, FILM COATED ORAL NIGHTLY
Qty: 30 TABLET | Refills: 3 | Status: SHIPPED | OUTPATIENT
Start: 2023-03-01

## 2023-03-01 RX ORDER — FLUOXETINE HYDROCHLORIDE 20 MG/1
20 CAPSULE ORAL EVERY MORNING
Qty: 30 CAPSULE | Refills: 3 | Status: SHIPPED | OUTPATIENT
Start: 2023-03-01

## 2023-03-01 RX ORDER — BUSPIRONE HYDROCHLORIDE 10 MG/1
20 TABLET ORAL 3 TIMES DAILY
Qty: 180 TABLET | Refills: 2 | Status: SHIPPED | OUTPATIENT
Start: 2023-03-01

## 2023-03-01 ASSESSMENT — ANXIETY QUESTIONNAIRES
5. BEING SO RESTLESS THAT IT IS HARD TO SIT STILL: 0
6. BECOMING EASILY ANNOYED OR IRRITABLE: 0
4. TROUBLE RELAXING: 0
7. FEELING AFRAID AS IF SOMETHING AWFUL MIGHT HAPPEN: 0
1. FEELING NERVOUS, ANXIOUS, OR ON EDGE: 0
2. NOT BEING ABLE TO STOP OR CONTROL WORRYING: 1
3. WORRYING TOO MUCH ABOUT DIFFERENT THINGS: 0
IF YOU CHECKED OFF ANY PROBLEMS ON THIS QUESTIONNAIRE, HOW DIFFICULT HAVE THESE PROBLEMS MADE IT FOR YOU TO DO YOUR WORK, TAKE CARE OF THINGS AT HOME, OR GET ALONG WITH OTHER PEOPLE: NOT DIFFICULT AT ALL
GAD7 TOTAL SCORE: 1

## 2023-03-01 ASSESSMENT — PATIENT HEALTH QUESTIONNAIRE - PHQ9
SUM OF ALL RESPONSES TO PHQ9 QUESTIONS 1 & 2: 0
7. TROUBLE CONCENTRATING ON THINGS, SUCH AS READING THE NEWSPAPER OR WATCHING TELEVISION: 0
5. POOR APPETITE OR OVEREATING: 0
9. THOUGHTS THAT YOU WOULD BE BETTER OFF DEAD, OR OF HURTING YOURSELF: 0
10. IF YOU CHECKED OFF ANY PROBLEMS, HOW DIFFICULT HAVE THESE PROBLEMS MADE IT FOR YOU TO DO YOUR WORK, TAKE CARE OF THINGS AT HOME, OR GET ALONG WITH OTHER PEOPLE: 0
4. FEELING TIRED OR HAVING LITTLE ENERGY: 0
2. FEELING DOWN, DEPRESSED OR HOPELESS: 0
SUM OF ALL RESPONSES TO PHQ QUESTIONS 1-9: 0
SUM OF ALL RESPONSES TO PHQ QUESTIONS 1-9: 0
1. LITTLE INTEREST OR PLEASURE IN DOING THINGS: 0
SUM OF ALL RESPONSES TO PHQ QUESTIONS 1-9: 0
SUM OF ALL RESPONSES TO PHQ QUESTIONS 1-9: 0
3. TROUBLE FALLING OR STAYING ASLEEP: 0
6. FEELING BAD ABOUT YOURSELF - OR THAT YOU ARE A FAILURE OR HAVE LET YOURSELF OR YOUR FAMILY DOWN: 0
8. MOVING OR SPEAKING SO SLOWLY THAT OTHER PEOPLE COULD HAVE NOTICED. OR THE OPPOSITE, BEING SO FIGETY OR RESTLESS THAT YOU HAVE BEEN MOVING AROUND A LOT MORE THAN USUAL: 0

## 2023-03-01 NOTE — PROGRESS NOTES
Patient:  Cece Shen  Age:  65 y.o.  :  1945     SEX:  female MRN:  [id number]     RACE: White (non-)     SEEN:  [x]  PATIENT  []  SPOUSE []  OTHER:              PHQ-9  3/1/2023 2022 2022   Little interest or pleasure in doing things 0 0 0   Little interest or pleasure in doing things - - -   Feeling down, depressed, or hopeless 0 0 0   Trouble falling or staying asleep, or sleeping too much 0 1 3   Trouble falling or staying asleep, or sleeping too much - - -   Feeling tired or having little energy 0 0 1   Feeling tired or having little energy - - -   Poor appetite or overeating 0 0 0   Poor appetite, weight loss, or overeating - - -   Feeling bad about yourself - or that you are a failure or have let yourself or your family down 0 0 0   Feeling bad about yourself - or that you are a failure or have let yourself or your family down - - -   Trouble concentrating on things, such as reading the newspaper or watching television 0 0 0   Trouble concentrating on things such as school, work, reading, or watching TV - - -   Moving or speaking so slowly that other people could have noticed.  Or the opposite - being so fidgety or restless that you have been moving around a lot more than usual 0 0 0   Moving or speaking so slowly that other people could have noticed; or the opposite being so fidgety that others notice - - -   Thoughts that you would be better off dead, or of hurting yourself in some way 0 0 0   Thoughts of being better off dead, or hurting yourself in some way - - -   PHQ-2 Score 0 0 0   Total Score PHQ 2 - - -   PHQ-9 Total Score 0 1 4   PHQ 9 Score - - -   If you checked off any problems, how difficult have these problems made it for you to do your work, take care of things at home, or get along with other people? 0 0 1   How difficult have these problems made it for you to do your work, take care of your home and get along with others - - -       DENG-7 SCREENING 3/1/2023 11/9/2022 8/17/2022   Feeling nervous, anxious, or on edge Not at all Not at all Nearly every day   Not being able to stop or control worrying Several days Several days Not at all   Worrying too much about different things Not at all Several days Several days   Trouble relaxing Not at all Not at all Several days   Being so restless that it is hard to sit still Not at all Not at all Not at all   Becoming easily annoyed or irritable Not at all Not at all Not at all   Feeling afraid as if something awful might happen Not at all Not at all Not at all   DENG-7 Total Score 1 2 5   How difficult have these problems made it for you to do your work, take care of things at home, or get along with other people? Not difficult at all Not difficult at all Not difficult at all   Feeling nervous, anxious, or on edge - - -   DENG-7 Total Score - - -        I was at home while conducting this encounter. Consent:  She and/or her healthcare decision maker is aware that this patient-initiated Telehealth encounter is a billable service, with coverage as determined by her insurance carrier. She is aware that she may receive a bill and has provided verbal consent to proceed: YesPatient identification was verified, and a caregiver was present when appropriate. The patient was located in a state where the provider was credentialed to provide care. This virtual visit was conducted telephone encounter only. -  I affirm this is a Patient Initiated Episode with an Established Patient who has not had a related appointment within my department in the past 7 days or scheduled within the next 24 hours. Note: this encounter is not billable if this call serves to triage the patient into an appointment for the relevant concern. Total Time: minutes: 11-20 minutes. Chief complaint:  Pt says she is doing good. Subjective:  Phone visit as patient was not able to connect virtually. States has been doing all right.   Taking her medications as prescribed. Denies side effects. Sleeping better on the combination of amitriptyline and Remeron. Son lives with her and he helps her with the bills. He was on short-term disability for her back. Now he is back at work. He works third shift and she sleeps when he comes home. She is sleeping better. Supportive psychotherapy provided. Patient denies suicidal ideation/homicidal ideations. Denies symptoms of psychosis. No side effects and risks of taking benzos with opioids discussed with her including profound sedation, hypotension, respiratory depression, risk of falls, risk of coma and death. Patient Active Problem List   Diagnosis    Recurrent major depressive disorder, in partial remission (HCC)    Chronic prescription benzodiazepine use    Chronic back pain    ALLEN (acute kidney injury) ([facility] [address]    Generalized anxiety disorder    Chronic diastolic CHF (congestive heart failure) ([facility] [address]    Late onset Alzheimer dementia ([facility] [address]    Pulmonary hypertension ([facility] [address]    Essential hypertension    Acute hypoxemic respiratory failure ([facility] [address]    History of shoulder surgery    Hypomagnesemia    Neuropathy    Dementia with behavioral disturbance    History of atrial fibrillation       Denies palpitation,SOB, Chest pain, headaches. In no acute distress.        MEDICATION REVIEW:    Current Medications:    Current Outpatient Medications   Medication Sig    busPIRone (BUSPAR) 10 MG tablet Take 2 tablets by mouth 3 times daily    FLUoxetine (PROZAC) 20 MG capsule Take 1 capsule by mouth every morning    mirtazapine (REMERON) 15 MG tablet Take 1 tablet by mouth nightly    amitriptyline (ELAVIL) 25 MG tablet Take 1 tablet by mouth nightly    ascorbic acid (VITAMIN C) 500 MG tablet Take 1 tablet by mouth daily    celecoxib (CELEBREX) 100 MG capsule Take 100 mg by mouth 2 times daily    choline fenofibrate (TRILIPIX) 135 MG CPDR delayed release capsule Take 135 mg by mouth daily    cyanocobalamin 500 MCG tablet Take 1 tablet by mouth daily    donepezil (ARICEPT) 10 MG tablet Take 10 mg by mouth    ferrous sulfate (IRON 325) 325 (65 Fe) MG tablet Take 1 tablet by mouth three times a week    gabapentin (NEURONTIN) 800 MG tablet Take 800 mg by mouth 3 times daily. HYDROcodone-acetaminophen (NORCO) 7.5-325 MG per tablet TAKE 1 TABLET BY MOUTH THREE TIMES DAILY AS NEEDED FOR PAIN    hyoscyamine (ANASPAZ;LEVSIN) 125 MCG tablet Hyoscyamine sulfate 0.125 mg tablet TAKE 1 TABLET BY MOUTH THREE TIMES DAILY AS NEEDED FOR CRAMPING OR DIARRHEA    levothyroxine (SYNTHROID) 75 MCG tablet Take 75 mcg by mouth every morning (before breakfast)    losartan (COZAAR) 25 MG tablet Take 25 mg by mouth daily    melatonin 5 MG TABS tablet Take 10 mg by mouth    metoprolol tartrate (LOPRESSOR) 25 MG tablet Take 12.5 mg by mouth every 12 hours    nitroGLYCERIN (NITROSTAT) 0.4 MG SL tablet Place 0.4 mg under the tongue    potassium chloride (KLOR-CON) 10 MEQ extended release tablet Take 10 mEq by mouth daily as needed    pravastatin (PRAVACHOL) 80 MG tablet Take 80 mg by mouth    furosemide (LASIX) 20 MG tablet Take 20 mg by mouth daily as needed (Patient not taking: Reported on 3/1/2023)     No current facility-administered medications for this visit. Allergies   Allergen Reactions    Fentanyl Nausea And Vomiting     Other reaction(s): Nausea and/or vomiting-Intolerance    Morphine Nausea Only    Oxycodone-Acetaminophen Nausea Only    Oxycodone Nausea And Vomiting       Past Medical History, Past Surgical History, Family history, Social History, and Medications were all reviewed with the patient today and updated as necessary.      Compliant with medication: Yes   Side effects from medications:  No     EXAMINATION  Musculoskeletal    GAIT AND STATION   [] WNL   [] RESTRICTED   [] UNSTEADY WALK        [] ABNORMAL   [] UNBALANCED         PSYCHIATRIC     GENERAL APPEARANCE:   []  WELL GROOMED []     DISHEVELED   []  UNKEMPT      []  UNUSUAL/BIZZARE [] WNL       ATTITUDE:   [x] COOPERATIVE   [] GUARDED   [] SUSPICIOUS      [] HOSTILE                            BEHAVIOR:   [x] CALM   [] HYPERACTIVE   [] MANNERISMS      [] BIZZARE         SPEECH:   [x] NORMAL FOR CLIENT   [] SPONTANEOUS   [] SLURRED   [] WHISPERING      [] LOUD   [] PRESSURED   [] ARTICULATE       EYE CONTACT:   [] WNL   [] BLANK STARE   [] INTENSE      [] AVOIDANT         MOOD:   [x] EUTHYMIC   [] ANXIOUS   [] DEPRESSED      [] IRRITABLE   [] ANGRY   [] APATHETIC     AFFECT:   [] CONGRUENT WITH MOOD   [] FLAT   [] CONSTRICTED      [] INAPPROPRIATE   [] LABILE           THOUGHT PROCESS:   [x] LOGICAL/GOAL-DIRECTED   [] FOI   [] CIRCUMSANTIAL      [] INCOHERENT   [] TANGENTIAL   [] CONCRETE      [] PERSEVERATION           THOUGHT CONTENT:                DELUSIONS  [x] DENIES  [] GRANDIOSE  [] PERSECUTORY  [] Hindu  [] REFERENCE   HALLUCINATIONS  [x] DENIES  [] AUDITORY  [] VISUAL  [] OLFACTORY  [] TACTILE     [] GUSTATORY  [] SOMATIC         OBSESSIONS  [x] DENIES  [] PRESENT         SUICIDAL IDEATION  [x] DENIES  [] PRESENT W/O PLAN  [] PRESENT W/ PLAN       HOMICIDAL IDEATION  [x] DENIES  [] PRESENT W/O PLAN  [] PRESENT W/ PLAN           JUDGEMENT:   [x] GOOD   [] FAIR   [] POOR   INSIGHT:   [x] GOOD   [] FAIR   [] POOR     COGNITION:           SENSORIUM:   [x] ALERT   [] CLOUDED   [] DROWSY     ORIENTATION:   [x] INTACT   [] TIME:  PLACE  PERSON   RECENT & REMOTE MEMORY:   [] NORMAL   [x] OTHER:                  ATTENTION:   [x] INTACT   [] MILD IMPAIRMENT   [] SEVERE IMPAIRMENT     CONCENTRATION:   [x] INTACT   [] MILD IMPAIRMENT   [] SEVERE IMPAIRMENT     LANGUAGE:   [x] AVERAGE   [] ABOVE AVERAGE   [] BELOW AVERAGE     FUND OF KNOWLEDGE:   [] UNABLE TO ASSESS AT THIS TIME   [x] AVERAGE   [] ABOVE AVERAGE   [] BELOW AVERAGE      [] GOOD TO EXCELLENT KNOWLEDGE OF CURRENT EVENTS   [] POOR TO NO KNLEDGE OF CURRENT EVENTS           ABNORMAL MOVEMENTS:   [] NONE   [] TICS   [] TREMORS   [] BIZZARE      [] FACE   [] TRUNK   [] EXTREMETIES   [] GESTURES        SLEEP:   [x] GOOD   [] FAIR   [] POOR      MUSCLE STRENGTH AND TONE   [] WNL   [] ATROPHY   [] SPASTIC        [] FLACCID   [] COGWHEEL         Diagnoses/Impressions:    ICD-10-CM    1. Recurrent major depressive disorder, in full remission ([facility] [address]  F33.42       2. Generalized anxiety disorder  F41.1       3. Primary insomnia  F51.01       4. Chronic pain disorder  G89.4       5. High risk medications (not anticoagulants) long-term use  Z79.899           TREATMENT GOALS:  Symptom reduction, Medication adherence, maintain therapeutic gains    LABS/IMAGING:    []  Ordered [x]  Reviewed []  New Labs Ordered:     LAB  WBC   Date/Time Value Ref Range Status   10/29/2021 07:16 PM 12.3 (H) 4.3 - 11.1 K/uL Final     Hemoglobin   Date/Time Value Ref Range Status   10/29/2021 07:16 PM 10.1 (L) 11.7 - 15.4 g/dL Final     Hematocrit   Date/Time Value Ref Range Status   10/29/2021 07:16 PM 32.1 (L) 35.8 - 46.3 % Final     Platelets   Date/Time Value Ref Range Status   10/29/2021 07:16  150 - 450 K/uL Final     Sodium   Date/Time Value Ref Range Status   11/02/2021 05:54  136 - 145 mmol/L Final     Potassium   Date/Time Value Ref Range Status   11/02/2021 05:54 AM 3.9 3.5 - 5.1 mmol/L Final     Chloride   Date/Time Value Ref Range Status   11/02/2021 05:54  98 - 107 mmol/L Final     CO2   Date/Time Value Ref Range Status   11/02/2021 05:54 AM 35 (H) 21 - 32 mmol/L Final     BUN   Date/Time Value Ref Range Status   11/02/2021 05:54 AM 27 (H) 8 - 23 MG/DL Final     Magnesium   Date/Time Value Ref Range Status   10/31/2021 03:56 AM 2.7 (H) 1.8 - 2.4 mg/dL Final       Please refer to the lab tab in the epic and care everywhere for the most recent lab results. Plan:     [x]  Medication ordered: Amitriptyline, BuSpar, Prozac, mirtazapine, xanax to target depression, anxiety, insomnia.     [x]  Medication education/counseling provided  Medication dosage and time to take, purpose/expected benefits/risks, common side effects, lab monitoring required and reason, expected length of treatment, risk of no treatment, effects on pregnancy/nursing, financial availability discussed. Educated patient on  side effects/risks/benefits of meds including  cardiac arrhythmias, suicidal ideations orthostatic hypotension, serotonin syndrome, risk of steffen/hypomania from antidepressants, withdrawals from abrupt discontinuation of meds, anticholinergic side effects, risk of bleeding, risk of seizures, addiction potential, memory impairment with long term use of benzos, respiratory depression, high blood pressure, dizziness, drowsiness, sedation, hypotension, coma, death, risk of falls, Risk & benefits discussed: including but not limited to possible off-label medication usage. [x] Follow MSE for sxs improvement     I have reviewed the patients controlled substance prescription history, as maintained in the [address] [place] [address] prescription monitoring program, so that the prescription(s) for a  controlled substance can be given. Recommendations and Referrals: Follow up with : MD, requires monitoring of response to medication, requires monitoring of medication side effects. Time until next PMA:     Follow up with Mental Health Clinicians: psychotherapy interventions, improve level of functioning, monitoring to prevent decompensation /hospitalization, monitoring to maintain therapeutic gains, symptoms (resolving and controlled)           Psychotherapy note:                                __10_ Minutes of psychotherapy     [x]  Supportive psychotherapy, Patient discussed certain situational and personal stressors ongoing in her life at this time, weight management d/w the patient. Sleep hygiene d/w patient. Patient allowed to vent out her emotions. Scenarios were reviewed using role playing and CBT techniques in order to increase insight and decrease anxiety.        [] Disposition planning      []  Dangerous and will not contract for safety in the community    **Pateint has been notified: They are to call 911 or go to their nearest E.R. if they are experiencing a medical emergency or suicidal ideations/homicidal ideations. **  All ancillary documentation entered reviewed by provider. PLEASE NOTE:  This document has been produced in part or whole using voice recognition software. Proofread however unrecognized errors in transcription may be present.         Sunny Brambila MD

## 2023-05-25 ENCOUNTER — OFFICE VISIT (OUTPATIENT)
Dept: BEHAVIORAL/MENTAL HEALTH CLINIC | Age: 78
End: 2023-05-25
Payer: MEDICARE

## 2023-05-25 VITALS
SYSTOLIC BLOOD PRESSURE: 124 MMHG | BODY MASS INDEX: 34.73 KG/M2 | HEART RATE: 74 BPM | OXYGEN SATURATION: 95 % | WEIGHT: 196 LBS | DIASTOLIC BLOOD PRESSURE: 78 MMHG | HEIGHT: 63 IN | TEMPERATURE: 98.2 F

## 2023-05-25 DIAGNOSIS — Z79.899 HIGH RISK MEDICATIONS (NOT ANTICOAGULANTS) LONG-TERM USE: ICD-10-CM

## 2023-05-25 DIAGNOSIS — F33.41 RECURRENT MAJOR DEPRESSIVE DISORDER, IN PARTIAL REMISSION (HCC): Primary | ICD-10-CM

## 2023-05-25 DIAGNOSIS — F51.01 PRIMARY INSOMNIA: ICD-10-CM

## 2023-05-25 DIAGNOSIS — G89.4 CHRONIC PAIN DISORDER: ICD-10-CM

## 2023-05-25 DIAGNOSIS — F41.1 GENERALIZED ANXIETY DISORDER: ICD-10-CM

## 2023-05-25 PROCEDURE — 1090F PRES/ABSN URINE INCON ASSESS: CPT | Performed by: PSYCHIATRY & NEUROLOGY

## 2023-05-25 PROCEDURE — 1123F ACP DISCUSS/DSCN MKR DOCD: CPT | Performed by: PSYCHIATRY & NEUROLOGY

## 2023-05-25 PROCEDURE — G8427 DOCREV CUR MEDS BY ELIG CLIN: HCPCS | Performed by: PSYCHIATRY & NEUROLOGY

## 2023-05-25 PROCEDURE — 99214 OFFICE O/P EST MOD 30 MIN: CPT | Performed by: PSYCHIATRY & NEUROLOGY

## 2023-05-25 PROCEDURE — 3074F SYST BP LT 130 MM HG: CPT | Performed by: PSYCHIATRY & NEUROLOGY

## 2023-05-25 PROCEDURE — 3078F DIAST BP <80 MM HG: CPT | Performed by: PSYCHIATRY & NEUROLOGY

## 2023-05-25 PROCEDURE — G8400 PT W/DXA NO RESULTS DOC: HCPCS | Performed by: PSYCHIATRY & NEUROLOGY

## 2023-05-25 PROCEDURE — 1036F TOBACCO NON-USER: CPT | Performed by: PSYCHIATRY & NEUROLOGY

## 2023-05-25 PROCEDURE — G8417 CALC BMI ABV UP PARAM F/U: HCPCS | Performed by: PSYCHIATRY & NEUROLOGY

## 2023-05-25 RX ORDER — FLUOXETINE 10 MG/1
10 CAPSULE ORAL EVERY MORNING
Qty: 30 CAPSULE | Refills: 3 | Status: SHIPPED | OUTPATIENT
Start: 2023-05-25

## 2023-05-25 RX ORDER — MIRTAZAPINE 30 MG/1
30 TABLET, FILM COATED ORAL NIGHTLY
Qty: 30 TABLET | Refills: 3 | Status: SHIPPED | OUTPATIENT
Start: 2023-05-25

## 2023-05-25 RX ORDER — AMITRIPTYLINE HYDROCHLORIDE 25 MG/1
25 TABLET, FILM COATED ORAL NIGHTLY
Qty: 30 TABLET | Refills: 3 | Status: SHIPPED | OUTPATIENT
Start: 2023-05-25

## 2023-05-25 RX ORDER — ALPRAZOLAM 0.25 MG/1
0.25 TABLET ORAL DAILY PRN
Qty: 30 TABLET | Refills: 3 | Status: SHIPPED | OUTPATIENT
Start: 2023-05-25 | End: 2023-09-22

## 2023-05-25 RX ORDER — BUSPIRONE HYDROCHLORIDE 10 MG/1
20 TABLET ORAL 3 TIMES DAILY
Qty: 180 TABLET | Refills: 2 | Status: SHIPPED | OUTPATIENT
Start: 2023-05-25

## 2023-05-25 ASSESSMENT — PATIENT HEALTH QUESTIONNAIRE - PHQ9
SUM OF ALL RESPONSES TO PHQ9 QUESTIONS 1 & 2: 2
10. IF YOU CHECKED OFF ANY PROBLEMS, HOW DIFFICULT HAVE THESE PROBLEMS MADE IT FOR YOU TO DO YOUR WORK, TAKE CARE OF THINGS AT HOME, OR GET ALONG WITH OTHER PEOPLE: 1
1. LITTLE INTEREST OR PLEASURE IN DOING THINGS: 1
SUM OF ALL RESPONSES TO PHQ QUESTIONS 1-9: 5
2. FEELING DOWN, DEPRESSED OR HOPELESS: 1
4. FEELING TIRED OR HAVING LITTLE ENERGY: 1
7. TROUBLE CONCENTRATING ON THINGS, SUCH AS READING THE NEWSPAPER OR WATCHING TELEVISION: 0
3. TROUBLE FALLING OR STAYING ASLEEP: 2
SUM OF ALL RESPONSES TO PHQ QUESTIONS 1-9: 5
8. MOVING OR SPEAKING SO SLOWLY THAT OTHER PEOPLE COULD HAVE NOTICED. OR THE OPPOSITE, BEING SO FIGETY OR RESTLESS THAT YOU HAVE BEEN MOVING AROUND A LOT MORE THAN USUAL: 0
9. THOUGHTS THAT YOU WOULD BE BETTER OFF DEAD, OR OF HURTING YOURSELF: 0
6. FEELING BAD ABOUT YOURSELF - OR THAT YOU ARE A FAILURE OR HAVE LET YOURSELF OR YOUR FAMILY DOWN: 0
SUM OF ALL RESPONSES TO PHQ QUESTIONS 1-9: 5
5. POOR APPETITE OR OVEREATING: 0
SUM OF ALL RESPONSES TO PHQ QUESTIONS 1-9: 5

## 2023-05-25 ASSESSMENT — MINI MENTAL STATE EXAM
WHAT COUNTRY ARE WE IN?: 1
WHAT CITY/TOWN ARE WE IN?: 1
WHAT MONTH IS THIS?: 1
ASK THE PERSON IF HE IS RIGHT OR LEFT-HANDED. TAKE A PIECE OF PAPER AND HOLD IT UP IN
FRONT OF THE PERSON. SAY: TAKE THIS PAPER IN YOUR RIGHT/LEFT HAND (WHICHEVER IS NON-
DOMINANT), SCORE IF PAPER IS PICKED UP IN CORRECT HAND.: 1
HAND THE PERSON A PENCIL AND PAPER. SAY: WRITE ANY COMPLETE SENTENCE ON THAT
PIECE OF PAPER. (NOTE: THE SENTENCE MUST MAKE SENSE. IGNORE SPELLING ERRORS): 1
SUM ALL MMSE QUESTIONS FOR TOTAL SCORE [OUT OF 30].: 30
SAY: I WOULD LIKE YOU TO REPEAT THIS PHRASE AFTER ME: NO IFS, ANDS, OR BUTS.: 1
SAY: PUT THE PAPER DOWN ON THE FLOOR, SCORE IF PAPER IS PLACED BACK ON FLOOR: 1
NOW WHAT WERE THE THREE OBJECTS I ASKED YOU TO REMEMBER?: 3
WHAT FLOOR ARE WE ON [IN FACILITY]?/ WHAT ROOM ARE WE IN [IN HOME]?: 1
WHAT DAY OF THE WEEK IS THIS?: 1
PLACE DESIGN, ERASER AND PENCIL IN FRONT OF THE PERSON.  SAY:  COPY THIS DESIGN PLEASE.  SHOW: DESIGN. ALLOW: MULTIPLE TRIES. WAIT UNTIL PERSON IS FINISHED AND HANDS IT BACK. SCORE: ONLY FOR DIAGRAM WITH 4-SIDED FIGURE BETWEEN TWO 5-SIDED FIGURES: 1
SHOW: WRISTWATCH [OBJECT] ASK: WHAT IS THIS CALLED?: 1
SAY: I AM GOING TO NAME THREE OBJECTS. WHEN I AM FINISHED, I WANT YOU TO REPEAT
THEM. REMEMBER WHAT THEY ARE BECAUSE I AM GOING TO ASK YOU TO NAME THEM AGAIN IN
A FEW MINUTES.  SAY THE FOLLOWING WORDS SLOWLY AT 1-SECOND INTERVALS - BALL/ CAR/ MAN [ITERATIONS FOR REPEAT ADMINISTRATION]: 3
WHAT STATE [OR PROVINCE] ARE WE IN?: 1
SAY: FOLD THE PAPER IN HALF ONCE WITH BOTH HANDS, SCORE IF PAPER IS CORRECTLY FOLDED IN HALF.: 1
SAY: READ THE WORDS ON THE PAGE AND THEN DO WHAT IT SAYS. THEN HAND THE PERSON
THE SHEET WITH CLOSE YOUR EYES ON IT. IF THE SUBJECT READS AND DOES NOT CLOSE THEIR EYES, REPEAT UP TO THREE TIMES. SCORE ONLY IF SUBJECT CLOSES EYES.: 1
WHAT IS TODAY'S DATE?: 1
SHOW: PENCIL [OBJECT] ASK: WHAT IS THIS CALLED?: 1
WHICH SEASON IS THIS?: 1
WHAT YEAR IS THIS?: 1
WHAT IS THE NAME OF THIS BUILDING [IN FACILITY]?/WHAT IS THE STREET ADDRESS OF THIS HOUSE [IN HOME]?: 1
SAY: I WOULD LIKE YOU TO COUNT BACKWARD FROM 100 BY SEVENS: 5

## 2023-05-25 ASSESSMENT — ANXIETY QUESTIONNAIRES
IF YOU CHECKED OFF ANY PROBLEMS ON THIS QUESTIONNAIRE, HOW DIFFICULT HAVE THESE PROBLEMS MADE IT FOR YOU TO DO YOUR WORK, TAKE CARE OF THINGS AT HOME, OR GET ALONG WITH OTHER PEOPLE: SOMEWHAT DIFFICULT
4. TROUBLE RELAXING: 1
2. NOT BEING ABLE TO STOP OR CONTROL WORRYING: 1
5. BEING SO RESTLESS THAT IT IS HARD TO SIT STILL: 0
3. WORRYING TOO MUCH ABOUT DIFFERENT THINGS: 1
1. FEELING NERVOUS, ANXIOUS, OR ON EDGE: 1
GAD7 TOTAL SCORE: 4
6. BECOMING EASILY ANNOYED OR IRRITABLE: 0
7. FEELING AFRAID AS IF SOMETHING AWFUL MIGHT HAPPEN: 0

## 2023-05-25 NOTE — PROGRESS NOTES
Patient:  Maryam Scherer  Age:  77 y.o.  :  1945     SEX:  female MRN:  [id number]     RACE: White (non-)     SEEN:  [x]  PATIENT  []  SPOUSE []  OTHER:              PHQ-9  2023 3/1/2023 2022   Little interest or pleasure in doing things 1 0 0   Little interest or pleasure in doing things - - -   Feeling down, depressed, or hopeless 1 0 0   Trouble falling or staying asleep, or sleeping too much 2 0 1   Trouble falling or staying asleep, or sleeping too much - - -   Feeling tired or having little energy 1 0 0   Feeling tired or having little energy - - -   Poor appetite or overeating 0 0 0   Poor appetite, weight loss, or overeating - - -   Feeling bad about yourself - or that you are a failure or have let yourself or your family down 0 0 0   Feeling bad about yourself - or that you are a failure or have let yourself or your family down - - -   Trouble concentrating on things, such as reading the newspaper or watching television 0 0 0   Trouble concentrating on things such as school, work, reading, or watching TV - - -   Moving or speaking so slowly that other people could have noticed. Or the opposite - being so fidgety or restless that you have been moving around a lot more than usual 0 0 0   Moving or speaking so slowly that other people could have noticed; or the opposite being so fidgety that others notice - - -   Thoughts that you would be better off dead, or of hurting yourself in some way 0 0 0   Thoughts of being better off dead, or hurting yourself in some way - - -   PHQ-2 Score 2 0 0   Total Score PHQ 2 - - -   PHQ-9 Total Score 5 0 1   PHQ 9 Score - - -   If you checked off any problems, how difficult have these problems made it for you to do your work, take care of things at home, or get along with other people?  1 0 0   How difficult have these problems made it for you to do your work, take care of your home and get along with others - - -       DENG-7

## 2023-07-28 RX ORDER — MIRTAZAPINE 15 MG/1
TABLET, FILM COATED ORAL
Qty: 30 TABLET | Refills: 3 | OUTPATIENT
Start: 2023-07-28

## 2023-07-28 RX ORDER — FLUOXETINE HYDROCHLORIDE 20 MG/1
20 CAPSULE ORAL EVERY MORNING
Qty: 30 CAPSULE | Refills: 3 | OUTPATIENT
Start: 2023-07-28

## 2023-08-17 ENCOUNTER — TELEMEDICINE (OUTPATIENT)
Dept: BEHAVIORAL/MENTAL HEALTH CLINIC | Age: 78
End: 2023-08-17
Payer: MEDICARE

## 2023-08-17 DIAGNOSIS — Z79.899 HIGH RISK MEDICATIONS (NOT ANTICOAGULANTS) LONG-TERM USE: ICD-10-CM

## 2023-08-17 DIAGNOSIS — F33.42 RECURRENT MAJOR DEPRESSIVE DISORDER, IN FULL REMISSION (HCC): Primary | ICD-10-CM

## 2023-08-17 DIAGNOSIS — G89.4 CHRONIC PAIN DISORDER: ICD-10-CM

## 2023-08-17 DIAGNOSIS — F41.1 GENERALIZED ANXIETY DISORDER: ICD-10-CM

## 2023-08-17 DIAGNOSIS — F51.01 PRIMARY INSOMNIA: ICD-10-CM

## 2023-08-17 PROCEDURE — 99214 OFFICE O/P EST MOD 30 MIN: CPT | Performed by: PSYCHIATRY & NEUROLOGY

## 2023-08-17 PROCEDURE — G8427 DOCREV CUR MEDS BY ELIG CLIN: HCPCS | Performed by: PSYCHIATRY & NEUROLOGY

## 2023-08-17 PROCEDURE — 1090F PRES/ABSN URINE INCON ASSESS: CPT | Performed by: PSYCHIATRY & NEUROLOGY

## 2023-08-17 PROCEDURE — G8400 PT W/DXA NO RESULTS DOC: HCPCS | Performed by: PSYCHIATRY & NEUROLOGY

## 2023-08-17 PROCEDURE — 1123F ACP DISCUSS/DSCN MKR DOCD: CPT | Performed by: PSYCHIATRY & NEUROLOGY

## 2023-08-17 RX ORDER — AMITRIPTYLINE HYDROCHLORIDE 25 MG/1
25 TABLET, FILM COATED ORAL NIGHTLY
Qty: 30 TABLET | Refills: 3 | Status: SHIPPED | OUTPATIENT
Start: 2023-08-17

## 2023-08-17 RX ORDER — ALPRAZOLAM 0.25 MG/1
0.25 TABLET ORAL DAILY PRN
Qty: 30 TABLET | Refills: 3 | Status: SHIPPED | OUTPATIENT
Start: 2023-08-17 | End: 2023-12-15

## 2023-08-17 RX ORDER — BUSPIRONE HYDROCHLORIDE 10 MG/1
20 TABLET ORAL 3 TIMES DAILY
Qty: 180 TABLET | Refills: 3 | Status: SHIPPED | OUTPATIENT
Start: 2023-08-17

## 2023-08-17 RX ORDER — MIRTAZAPINE 30 MG/1
30 TABLET, FILM COATED ORAL NIGHTLY
Qty: 30 TABLET | Refills: 3 | Status: SHIPPED | OUTPATIENT
Start: 2023-08-17

## 2023-08-17 RX ORDER — FLUOXETINE 10 MG/1
10 CAPSULE ORAL EVERY MORNING
Qty: 30 CAPSULE | Refills: 3 | Status: SHIPPED | OUTPATIENT
Start: 2023-08-17

## 2023-08-17 ASSESSMENT — PATIENT HEALTH QUESTIONNAIRE - PHQ9
8. MOVING OR SPEAKING SO SLOWLY THAT OTHER PEOPLE COULD HAVE NOTICED. OR THE OPPOSITE, BEING SO FIGETY OR RESTLESS THAT YOU HAVE BEEN MOVING AROUND A LOT MORE THAN USUAL: 0
4. FEELING TIRED OR HAVING LITTLE ENERGY: 0
7. TROUBLE CONCENTRATING ON THINGS, SUCH AS READING THE NEWSPAPER OR WATCHING TELEVISION: 0
10. IF YOU CHECKED OFF ANY PROBLEMS, HOW DIFFICULT HAVE THESE PROBLEMS MADE IT FOR YOU TO DO YOUR WORK, TAKE CARE OF THINGS AT HOME, OR GET ALONG WITH OTHER PEOPLE: 0
SUM OF ALL RESPONSES TO PHQ QUESTIONS 1-9: 0
SUM OF ALL RESPONSES TO PHQ QUESTIONS 1-9: 0
3. TROUBLE FALLING OR STAYING ASLEEP: 0
SUM OF ALL RESPONSES TO PHQ9 QUESTIONS 1 & 2: 0
SUM OF ALL RESPONSES TO PHQ QUESTIONS 1-9: 0
2. FEELING DOWN, DEPRESSED OR HOPELESS: 0
9. THOUGHTS THAT YOU WOULD BE BETTER OFF DEAD, OR OF HURTING YOURSELF: 0
1. LITTLE INTEREST OR PLEASURE IN DOING THINGS: 0
SUM OF ALL RESPONSES TO PHQ QUESTIONS 1-9: 0
5. POOR APPETITE OR OVEREATING: 0
6. FEELING BAD ABOUT YOURSELF - OR THAT YOU ARE A FAILURE OR HAVE LET YOURSELF OR YOUR FAMILY DOWN: 0

## 2023-08-17 ASSESSMENT — ANXIETY QUESTIONNAIRES
IF YOU CHECKED OFF ANY PROBLEMS ON THIS QUESTIONNAIRE, HOW DIFFICULT HAVE THESE PROBLEMS MADE IT FOR YOU TO DO YOUR WORK, TAKE CARE OF THINGS AT HOME, OR GET ALONG WITH OTHER PEOPLE: NOT DIFFICULT AT ALL
5. BEING SO RESTLESS THAT IT IS HARD TO SIT STILL: 0
7. FEELING AFRAID AS IF SOMETHING AWFUL MIGHT HAPPEN: 0
GAD7 TOTAL SCORE: 0
3. WORRYING TOO MUCH ABOUT DIFFERENT THINGS: 0
6. BECOMING EASILY ANNOYED OR IRRITABLE: 0
1. FEELING NERVOUS, ANXIOUS, OR ON EDGE: 0
2. NOT BEING ABLE TO STOP OR CONTROL WORRYING: 0
4. TROUBLE RELAXING: 0

## 2023-08-17 NOTE — PROGRESS NOTES
Patient:  Marisa Ziegler  Age:  77 y.o.  :  1945     SEX:  female MRN:  [id number]     RACE: White (non-)     SEEN:  [x]  PATIENT  []  SPOUSE []  OTHER:              PHQ-9  2023 2023 3/1/2023   Little interest or pleasure in doing things 0 1 0   Little interest or pleasure in doing things - - -   Feeling down, depressed, or hopeless 0 1 0   Trouble falling or staying asleep, or sleeping too much 0 2 0   Trouble falling or staying asleep, or sleeping too much - - -   Feeling tired or having little energy 0 1 0   Feeling tired or having little energy - - -   Poor appetite or overeating 0 0 0   Poor appetite, weight loss, or overeating - - -   Feeling bad about yourself - or that you are a failure or have let yourself or your family down 0 0 0   Feeling bad about yourself - or that you are a failure or have let yourself or your family down - - -   Trouble concentrating on things, such as reading the newspaper or watching television 0 0 0   Trouble concentrating on things such as school, work, reading, or watching TV - - -   Moving or speaking so slowly that other people could have noticed. Or the opposite - being so fidgety or restless that you have been moving around a lot more than usual 0 0 0   Moving or speaking so slowly that other people could have noticed; or the opposite being so fidgety that others notice - - -   Thoughts that you would be better off dead, or of hurting yourself in some way 0 0 0   Thoughts of being better off dead, or hurting yourself in some way - - -   PHQ-2 Score 0 2 0   Total Score PHQ 2 - - -   PHQ-9 Total Score 0 5 0   PHQ 9 Score - - -   If you checked off any problems, how difficult have these problems made it for you to do your work, take care of things at home, or get along with other people?  0 1 0   How difficult have these problems made it for you to do your work, take care of your home and get along with others - - -       DENG-7

## 2023-12-26 ENCOUNTER — TELEPHONE (OUTPATIENT)
Dept: BEHAVIORAL/MENTAL HEALTH CLINIC | Age: 78
End: 2023-12-26

## 2023-12-26 RX ORDER — MIRTAZAPINE 30 MG/1
30 TABLET, FILM COATED ORAL NIGHTLY
Qty: 30 TABLET | Refills: 3 | OUTPATIENT
Start: 2023-12-26

## 2023-12-27 RX ORDER — MIRTAZAPINE 30 MG/1
30 TABLET, FILM COATED ORAL NIGHTLY
Qty: 30 TABLET | Refills: 1 | Status: SHIPPED | OUTPATIENT
Start: 2023-12-27

## 2024-01-02 ENCOUNTER — TELEPHONE (OUTPATIENT)
Dept: PRIMARY CARE CLINIC | Facility: CLINIC | Age: 79
End: 2024-01-02

## 2024-01-02 NOTE — TELEPHONE ENCOUNTER
Patient called because the pharmacy still saying ha not received the prescription,can you please contact patient .    Thank you

## 2024-01-03 RX ORDER — AMITRIPTYLINE HYDROCHLORIDE 25 MG/1
25 TABLET, FILM COATED ORAL NIGHTLY
Qty: 30 TABLET | Refills: 1 | Status: SHIPPED | OUTPATIENT
Start: 2024-01-03

## 2024-01-03 RX ORDER — BUSPIRONE HYDROCHLORIDE 10 MG/1
20 TABLET ORAL 3 TIMES DAILY
Qty: 180 TABLET | Refills: 1 | Status: SHIPPED | OUTPATIENT
Start: 2024-01-03

## 2024-01-22 RX ORDER — FLUOXETINE 10 MG/1
10 CAPSULE ORAL EVERY MORNING
Qty: 30 CAPSULE | Refills: 3 | OUTPATIENT
Start: 2024-01-22

## 2024-01-29 ENCOUNTER — TELEPHONE (OUTPATIENT)
Dept: BEHAVIORAL/MENTAL HEALTH CLINIC | Age: 79
End: 2024-01-29

## 2024-01-30 RX ORDER — FLUOXETINE 10 MG/1
10 CAPSULE ORAL EVERY MORNING
Qty: 30 CAPSULE | Refills: 0 | Status: SHIPPED | OUTPATIENT
Start: 2024-01-30

## 2024-02-19 RX ORDER — MIRTAZAPINE 30 MG/1
30 TABLET, FILM COATED ORAL NIGHTLY
Qty: 30 TABLET | Refills: 1 | OUTPATIENT
Start: 2024-02-19

## 2024-02-19 RX ORDER — BUSPIRONE HYDROCHLORIDE 10 MG/1
20 TABLET ORAL 3 TIMES DAILY
Qty: 180 TABLET | Refills: 1 | OUTPATIENT
Start: 2024-02-19

## 2024-02-20 ENCOUNTER — OFFICE VISIT (OUTPATIENT)
Dept: BEHAVIORAL/MENTAL HEALTH CLINIC | Age: 79
End: 2024-02-20
Payer: MEDICARE

## 2024-02-20 VITALS
BODY MASS INDEX: 35.79 KG/M2 | OXYGEN SATURATION: 96 % | HEART RATE: 72 BPM | DIASTOLIC BLOOD PRESSURE: 71 MMHG | WEIGHT: 202 LBS | HEIGHT: 63 IN | TEMPERATURE: 98.1 F | SYSTOLIC BLOOD PRESSURE: 149 MMHG

## 2024-02-20 DIAGNOSIS — G89.4 CHRONIC PAIN DISORDER: ICD-10-CM

## 2024-02-20 DIAGNOSIS — F51.01 PRIMARY INSOMNIA: ICD-10-CM

## 2024-02-20 DIAGNOSIS — F33.41 RECURRENT MAJOR DEPRESSIVE DISORDER, IN PARTIAL REMISSION (HCC): Primary | ICD-10-CM

## 2024-02-20 DIAGNOSIS — Z79.899 HIGH RISK MEDICATIONS (NOT ANTICOAGULANTS) LONG-TERM USE: ICD-10-CM

## 2024-02-20 DIAGNOSIS — F41.1 GENERALIZED ANXIETY DISORDER: ICD-10-CM

## 2024-02-20 PROCEDURE — 1090F PRES/ABSN URINE INCON ASSESS: CPT | Performed by: PSYCHIATRY & NEUROLOGY

## 2024-02-20 PROCEDURE — G8427 DOCREV CUR MEDS BY ELIG CLIN: HCPCS | Performed by: PSYCHIATRY & NEUROLOGY

## 2024-02-20 PROCEDURE — 99214 OFFICE O/P EST MOD 30 MIN: CPT | Performed by: PSYCHIATRY & NEUROLOGY

## 2024-02-20 PROCEDURE — G8484 FLU IMMUNIZE NO ADMIN: HCPCS | Performed by: PSYCHIATRY & NEUROLOGY

## 2024-02-20 PROCEDURE — 3077F SYST BP >= 140 MM HG: CPT | Performed by: PSYCHIATRY & NEUROLOGY

## 2024-02-20 PROCEDURE — 1123F ACP DISCUSS/DSCN MKR DOCD: CPT | Performed by: PSYCHIATRY & NEUROLOGY

## 2024-02-20 PROCEDURE — G8400 PT W/DXA NO RESULTS DOC: HCPCS | Performed by: PSYCHIATRY & NEUROLOGY

## 2024-02-20 PROCEDURE — 3078F DIAST BP <80 MM HG: CPT | Performed by: PSYCHIATRY & NEUROLOGY

## 2024-02-20 PROCEDURE — 1036F TOBACCO NON-USER: CPT | Performed by: PSYCHIATRY & NEUROLOGY

## 2024-02-20 PROCEDURE — G8417 CALC BMI ABV UP PARAM F/U: HCPCS | Performed by: PSYCHIATRY & NEUROLOGY

## 2024-02-20 RX ORDER — FLUOXETINE 10 MG/1
10 CAPSULE ORAL EVERY MORNING
Qty: 30 CAPSULE | Refills: 0 | Status: SHIPPED | OUTPATIENT
Start: 2024-02-20

## 2024-02-20 RX ORDER — MIRTAZAPINE 30 MG/1
30 TABLET, FILM COATED ORAL NIGHTLY
Qty: 30 TABLET | Refills: 1 | Status: SHIPPED | OUTPATIENT
Start: 2024-02-20

## 2024-02-20 RX ORDER — ALPRAZOLAM 0.25 MG/1
TABLET ORAL
COMMUNITY
Start: 2024-01-17 | End: 2024-02-20 | Stop reason: SDUPTHER

## 2024-02-20 RX ORDER — AMITRIPTYLINE HYDROCHLORIDE 25 MG/1
25 TABLET, FILM COATED ORAL NIGHTLY
Qty: 30 TABLET | Refills: 1 | Status: SHIPPED | OUTPATIENT
Start: 2024-02-20

## 2024-02-20 RX ORDER — CHOLECALCIFEROL (VITAMIN D3) 125 MCG
10 CAPSULE ORAL NIGHTLY
Qty: 60 TABLET | Refills: 3 | Status: SHIPPED | OUTPATIENT
Start: 2024-02-20

## 2024-02-20 RX ORDER — BUSPIRONE HYDROCHLORIDE 10 MG/1
20 TABLET ORAL 3 TIMES DAILY
Qty: 180 TABLET | Refills: 1 | Status: SHIPPED | OUTPATIENT
Start: 2024-02-20

## 2024-02-20 RX ORDER — ALPRAZOLAM 0.25 MG/1
0.25 TABLET ORAL DAILY PRN
Qty: 30 TABLET | Refills: 3 | Status: SHIPPED | OUTPATIENT
Start: 2024-02-20 | End: 2024-06-19

## 2024-02-20 ASSESSMENT — ANXIETY QUESTIONNAIRES
4. TROUBLE RELAXING: 0
2. NOT BEING ABLE TO STOP OR CONTROL WORRYING: 1
3. WORRYING TOO MUCH ABOUT DIFFERENT THINGS: 1
6. BECOMING EASILY ANNOYED OR IRRITABLE: 2
1. FEELING NERVOUS, ANXIOUS, OR ON EDGE: 1
GAD7 TOTAL SCORE: 5
7. FEELING AFRAID AS IF SOMETHING AWFUL MIGHT HAPPEN: 0
5. BEING SO RESTLESS THAT IT IS HARD TO SIT STILL: 0

## 2024-02-20 ASSESSMENT — PATIENT HEALTH QUESTIONNAIRE - PHQ9
7. TROUBLE CONCENTRATING ON THINGS, SUCH AS READING THE NEWSPAPER OR WATCHING TELEVISION: 0
3. TROUBLE FALLING OR STAYING ASLEEP: 1
SUM OF ALL RESPONSES TO PHQ QUESTIONS 1-9: 6
6. FEELING BAD ABOUT YOURSELF - OR THAT YOU ARE A FAILURE OR HAVE LET YOURSELF OR YOUR FAMILY DOWN: 0
2. FEELING DOWN, DEPRESSED OR HOPELESS: 1
4. FEELING TIRED OR HAVING LITTLE ENERGY: 2
5. POOR APPETITE OR OVEREATING: 0
10. IF YOU CHECKED OFF ANY PROBLEMS, HOW DIFFICULT HAVE THESE PROBLEMS MADE IT FOR YOU TO DO YOUR WORK, TAKE CARE OF THINGS AT HOME, OR GET ALONG WITH OTHER PEOPLE: 0
SUM OF ALL RESPONSES TO PHQ QUESTIONS 1-9: 6
1. LITTLE INTEREST OR PLEASURE IN DOING THINGS: 2
SUM OF ALL RESPONSES TO PHQ9 QUESTIONS 1 & 2: 3
SUM OF ALL RESPONSES TO PHQ QUESTIONS 1-9: 6
9. THOUGHTS THAT YOU WOULD BE BETTER OFF DEAD, OR OF HURTING YOURSELF: 0
8. MOVING OR SPEAKING SO SLOWLY THAT OTHER PEOPLE COULD HAVE NOTICED. OR THE OPPOSITE, BEING SO FIGETY OR RESTLESS THAT YOU HAVE BEEN MOVING AROUND A LOT MORE THAN USUAL: 0
SUM OF ALL RESPONSES TO PHQ QUESTIONS 1-9: 6

## 2024-02-20 NOTE — PROGRESS NOTES
Patient:  Benita Edmonds  Age:  78 y.o.  :  1945     SEX:  female MRN:  [id number]     RACE: White (non-)     SEEN:  [x]  PATIENT  []  SPOUSE []  OTHER:                  2024     1:36 PM 2023     2:45 PM 2023     2:49 PM   PHQ-9    Little interest or pleasure in doing things 2 0 0   Feeling down, depressed, or hopeless 1 0 0   Trouble falling or staying asleep, or sleeping too much 1 0 0   Feeling tired or having little energy 2 1 0   Poor appetite or overeating 0 0 0   Feeling bad about yourself - or that you are a failure or have let yourself or your family down 0 0 0   Trouble concentrating on things, such as reading the newspaper or watching television 0 0 0   Moving or speaking so slowly that other people could have noticed. Or the opposite - being so fidgety or restless that you have been moving around a lot more than usual 0 0 0   Thoughts that you would be better off dead, or of hurting yourself in some way 0 0 0   PHQ-2 Score 3 0 0   PHQ-9 Total Score 6 1 0   If you checked off any problems, how difficult have these problems made it for you to do your work, take care of things at home, or get along with other people? 0 0 0           2024     1:37 PM 2023     2:45 PM 2023     2:50 PM   DENG-7 SCREENING   Feeling nervous, anxious, or on edge Several days Not at all Not at all   Not being able to stop or control worrying Several days Not at all Not at all   Worrying too much about different things Several days Not at all Not at all   Trouble relaxing Not at all Not at all Not at all   Being so restless that it is hard to sit still Not at all Not at all Not at all   Becoming easily annoyed or irritable More than half the days Not at all Not at all   Feeling afraid as if something awful might happen Not at all Not at all Not at all   DENG-7 Total Score 5 0 0   How difficult have these problems made it for you to do your work, take care of things

## 2024-04-26 ENCOUNTER — TELEPHONE (OUTPATIENT)
Dept: BEHAVIORAL/MENTAL HEALTH CLINIC | Age: 79
End: 2024-04-26

## 2024-04-29 NOTE — TELEPHONE ENCOUNTER
Returned call. This was sent to pharmacy 2/20 with 3 refills. Patient will need to contact pharmacy. Patient stated that she was going by the bottle and had not spoken to pharmacy.

## 2024-05-14 ENCOUNTER — OFFICE VISIT (OUTPATIENT)
Dept: BEHAVIORAL/MENTAL HEALTH CLINIC | Age: 79
End: 2024-05-14
Payer: MEDICARE

## 2024-05-14 VITALS
WEIGHT: 186 LBS | HEIGHT: 63 IN | HEART RATE: 62 BPM | BODY MASS INDEX: 32.96 KG/M2 | DIASTOLIC BLOOD PRESSURE: 62 MMHG | TEMPERATURE: 98.3 F | OXYGEN SATURATION: 97 % | SYSTOLIC BLOOD PRESSURE: 145 MMHG

## 2024-05-14 DIAGNOSIS — F51.01 PRIMARY INSOMNIA: ICD-10-CM

## 2024-05-14 DIAGNOSIS — F33.41 RECURRENT MAJOR DEPRESSIVE DISORDER, IN PARTIAL REMISSION (HCC): Primary | ICD-10-CM

## 2024-05-14 DIAGNOSIS — G89.4 CHRONIC PAIN DISORDER: ICD-10-CM

## 2024-05-14 DIAGNOSIS — Z79.899 HIGH RISK MEDICATIONS (NOT ANTICOAGULANTS) LONG-TERM USE: ICD-10-CM

## 2024-05-14 DIAGNOSIS — F41.1 GENERALIZED ANXIETY DISORDER: ICD-10-CM

## 2024-05-14 PROCEDURE — G8400 PT W/DXA NO RESULTS DOC: HCPCS | Performed by: PSYCHIATRY & NEUROLOGY

## 2024-05-14 PROCEDURE — 99214 OFFICE O/P EST MOD 30 MIN: CPT | Performed by: PSYCHIATRY & NEUROLOGY

## 2024-05-14 PROCEDURE — G8417 CALC BMI ABV UP PARAM F/U: HCPCS | Performed by: PSYCHIATRY & NEUROLOGY

## 2024-05-14 PROCEDURE — G8427 DOCREV CUR MEDS BY ELIG CLIN: HCPCS | Performed by: PSYCHIATRY & NEUROLOGY

## 2024-05-14 PROCEDURE — 1036F TOBACCO NON-USER: CPT | Performed by: PSYCHIATRY & NEUROLOGY

## 2024-05-14 PROCEDURE — 1090F PRES/ABSN URINE INCON ASSESS: CPT | Performed by: PSYCHIATRY & NEUROLOGY

## 2024-05-14 PROCEDURE — 3078F DIAST BP <80 MM HG: CPT | Performed by: PSYCHIATRY & NEUROLOGY

## 2024-05-14 PROCEDURE — 1123F ACP DISCUSS/DSCN MKR DOCD: CPT | Performed by: PSYCHIATRY & NEUROLOGY

## 2024-05-14 PROCEDURE — 3077F SYST BP >= 140 MM HG: CPT | Performed by: PSYCHIATRY & NEUROLOGY

## 2024-05-14 RX ORDER — AMITRIPTYLINE HYDROCHLORIDE 25 MG/1
25 TABLET, FILM COATED ORAL NIGHTLY
Qty: 30 TABLET | Refills: 3 | Status: SHIPPED | OUTPATIENT
Start: 2024-05-14

## 2024-05-14 RX ORDER — ALPRAZOLAM 0.25 MG/1
0.25 TABLET ORAL DAILY PRN
Qty: 30 TABLET | Refills: 3 | Status: CANCELLED | OUTPATIENT
Start: 2024-05-14 | End: 2024-09-11

## 2024-05-14 RX ORDER — MIRTAZAPINE 30 MG/1
30 TABLET, FILM COATED ORAL NIGHTLY
Qty: 30 TABLET | Refills: 3 | Status: SHIPPED | OUTPATIENT
Start: 2024-05-14

## 2024-05-14 RX ORDER — FLUOXETINE 10 MG/1
10 CAPSULE ORAL EVERY MORNING
Qty: 90 CAPSULE | Refills: 3 | Status: SHIPPED | OUTPATIENT
Start: 2024-05-14

## 2024-05-14 RX ORDER — BUSPIRONE HYDROCHLORIDE 10 MG/1
20 TABLET ORAL 3 TIMES DAILY
Qty: 180 TABLET | Refills: 3 | Status: SHIPPED | OUTPATIENT
Start: 2024-05-14

## 2024-05-14 RX ORDER — CHOLECALCIFEROL (VITAMIN D3) 125 MCG
10 CAPSULE ORAL NIGHTLY
Qty: 60 TABLET | Refills: 3 | Status: SHIPPED | OUTPATIENT
Start: 2024-05-14

## 2024-05-14 ASSESSMENT — PATIENT HEALTH QUESTIONNAIRE - PHQ9
SUM OF ALL RESPONSES TO PHQ QUESTIONS 1-9: 6
2. FEELING DOWN, DEPRESSED OR HOPELESS: MORE THAN HALF THE DAYS
1. LITTLE INTEREST OR PLEASURE IN DOING THINGS: NOT AT ALL
6. FEELING BAD ABOUT YOURSELF - OR THAT YOU ARE A FAILURE OR HAVE LET YOURSELF OR YOUR FAMILY DOWN: NOT AT ALL
3. TROUBLE FALLING OR STAYING ASLEEP: MORE THAN HALF THE DAYS
10. IF YOU CHECKED OFF ANY PROBLEMS, HOW DIFFICULT HAVE THESE PROBLEMS MADE IT FOR YOU TO DO YOUR WORK, TAKE CARE OF THINGS AT HOME, OR GET ALONG WITH OTHER PEOPLE: NOT DIFFICULT AT ALL
SUM OF ALL RESPONSES TO PHQ QUESTIONS 1-9: 6
8. MOVING OR SPEAKING SO SLOWLY THAT OTHER PEOPLE COULD HAVE NOTICED. OR THE OPPOSITE, BEING SO FIGETY OR RESTLESS THAT YOU HAVE BEEN MOVING AROUND A LOT MORE THAN USUAL: NOT AT ALL
SUM OF ALL RESPONSES TO PHQ9 QUESTIONS 1 & 2: 2
9. THOUGHTS THAT YOU WOULD BE BETTER OFF DEAD, OR OF HURTING YOURSELF: NOT AT ALL
SUM OF ALL RESPONSES TO PHQ QUESTIONS 1-9: 6
SUM OF ALL RESPONSES TO PHQ QUESTIONS 1-9: 6
7. TROUBLE CONCENTRATING ON THINGS, SUCH AS READING THE NEWSPAPER OR WATCHING TELEVISION: NOT AT ALL
4. FEELING TIRED OR HAVING LITTLE ENERGY: MORE THAN HALF THE DAYS
5. POOR APPETITE OR OVEREATING: NOT AT ALL

## 2024-05-14 ASSESSMENT — MINI MENTAL STATE EXAM
WHAT COUNTRY ARE WE IN?: 1
WHAT YEAR IS THIS?: 1
SAY: READ THE WORDS ON THE PAGE AND THEN DO WHAT IT SAYS. THEN HAND THE PERSON
THE SHEET WITH CLOSE YOUR EYES ON IT. IF THE SUBJECT READS AND DOES NOT CLOSE THEIR EYES, REPEAT UP TO THREE TIMES. SCORE ONLY IF SUBJECT CLOSES EYES.: 1
WHAT FLOOR ARE WE ON [IN FACILITY]?/ WHAT ROOM ARE WE IN [IN HOME]?: 1
SAY: I WOULD LIKE YOU TO COUNT BACKWARD FROM 100 BY SEVENS: 5
WHICH SEASON IS THIS?: 1
SAY: FOLD THE PAPER IN HALF ONCE WITH BOTH HANDS, SCORE IF PAPER IS CORRECTLY FOLDED IN HALF.: 1
ASK THE PERSON IF HE IS RIGHT OR LEFT-HANDED. TAKE A PIECE OF PAPER AND HOLD IT UP IN
FRONT OF THE PERSON. SAY: TAKE THIS PAPER IN YOUR RIGHT/LEFT HAND (WHICHEVER IS NON-
DOMINANT), SCORE IF PAPER IS PICKED UP IN CORRECT HAND.: 1
WHAT DAY OF THE WEEK IS THIS?: 1
PLACE DESIGN, ERASER AND PENCIL IN FRONT OF THE PERSON.  SAY:  COPY THIS DESIGN PLEASE.  SHOW: DESIGN. ALLOW: MULTIPLE TRIES. WAIT UNTIL PERSON IS FINISHED AND HANDS IT BACK. SCORE: ONLY FOR DIAGRAM WITH 4-SIDED FIGURE BETWEEN TWO 5-SIDED FIGURES: 1
NOW WHAT WERE THE THREE OBJECTS I ASKED YOU TO REMEMBER?: 3
SUM ALL MMSE QUESTIONS FOR TOTAL SCORE [OUT OF 30].: 30
SAY: I WOULD LIKE YOU TO REPEAT THIS PHRASE AFTER ME: NO IFS, ANDS, OR BUTS.: 1
HAND THE PERSON A PENCIL AND PAPER. SAY: WRITE ANY COMPLETE SENTENCE ON THAT
PIECE OF PAPER. (NOTE: THE SENTENCE MUST MAKE SENSE. IGNORE SPELLING ERRORS): 1
WHAT STATE [OR PROVINCE] ARE WE IN?: 1
WHAT IS THE NAME OF THIS BUILDING [IN FACILITY]?/WHAT IS THE STREET ADDRESS OF THIS HOUSE [IN HOME]?: 1
SHOW: WRISTWATCH [OBJECT] ASK: WHAT IS THIS CALLED?: 1
WHAT IS TODAY'S DATE?: 1
SHOW: PENCIL [OBJECT] ASK: WHAT IS THIS CALLED?: 1
WHAT CITY/TOWN ARE WE IN?: 1
WHAT MONTH IS THIS?: 1
SAY: PUT THE PAPER DOWN ON THE FLOOR, SCORE IF PAPER IS PLACED BACK ON FLOOR: 1

## 2024-05-14 ASSESSMENT — ANXIETY QUESTIONNAIRES
4. TROUBLE RELAXING: SEVERAL DAYS
6. BECOMING EASILY ANNOYED OR IRRITABLE: SEVERAL DAYS
3. WORRYING TOO MUCH ABOUT DIFFERENT THINGS: SEVERAL DAYS
7. FEELING AFRAID AS IF SOMETHING AWFUL MIGHT HAPPEN: NOT AT ALL
GAD7 TOTAL SCORE: 8
1. FEELING NERVOUS, ANXIOUS, OR ON EDGE: MORE THAN HALF THE DAYS
5. BEING SO RESTLESS THAT IT IS HARD TO SIT STILL: SEVERAL DAYS
IF YOU CHECKED OFF ANY PROBLEMS ON THIS QUESTIONNAIRE, HOW DIFFICULT HAVE THESE PROBLEMS MADE IT FOR YOU TO DO YOUR WORK, TAKE CARE OF THINGS AT HOME, OR GET ALONG WITH OTHER PEOPLE: NOT DIFFICULT AT ALL
2. NOT BEING ABLE TO STOP OR CONTROL WORRYING: MORE THAN HALF THE DAYS

## 2024-05-14 NOTE — PROGRESS NOTES
Take 2 tablets by mouth 3 times daily    FLUoxetine (PROZAC) 10 MG capsule Take 1 capsule by mouth every morning    melatonin 5 MG TABS tablet Take 2 tablets by mouth nightly    mirtazapine (REMERON) 30 MG tablet Take 1 tablet by mouth nightly    ALPRAZolam (XANAX) 0.25 MG tablet Take 1 tablet by mouth daily as needed for Anxiety for up to 120 days. Max Daily Amount: 0.25 mg    ascorbic acid (VITAMIN C) 500 MG tablet Take 1 tablet by mouth daily    celecoxib (CELEBREX) 100 MG capsule Take 1 capsule by mouth 2 times daily    choline fenofibrate (TRILIPIX) 135 MG CPDR delayed release capsule Take 1 capsule by mouth daily    cyanocobalamin 500 MCG tablet Take 1 tablet by mouth daily    ferrous sulfate (IRON 325) 325 (65 Fe) MG tablet Take 1 tablet by mouth three times a week    gabapentin (NEURONTIN) 800 MG tablet Take 1 tablet by mouth 3 times daily.    HYDROcodone-acetaminophen (NORCO) 7.5-325 MG per tablet TAKE 1 TABLET BY MOUTH THREE TIMES DAILY AS NEEDED FOR PAIN    hyoscyamine (ANASPAZ;LEVSIN) 125 MCG tablet Hyoscyamine sulfate 0.125 mg tablet TAKE 1 TABLET BY MOUTH THREE TIMES DAILY AS NEEDED FOR CRAMPING OR DIARRHEA    levothyroxine (SYNTHROID) 75 MCG tablet Take 1 tablet by mouth every morning (before breakfast)    losartan (COZAAR) 25 MG tablet Take 1 tablet by mouth daily    metoprolol tartrate (LOPRESSOR) 25 MG tablet Take 0.5 tablets by mouth in the morning and 0.5 tablets in the evening.    nitroGLYCERIN (NITROSTAT) 0.4 MG SL tablet Place 1 tablet under the tongue    potassium chloride (KLOR-CON) 10 MEQ extended release tablet Take 1 tablet by mouth daily as needed    pravastatin (PRAVACHOL) 80 MG tablet Take 1 tablet by mouth    donepezil (ARICEPT) 10 MG tablet Take 1 tablet by mouth (Patient not taking: Reported on 11/16/2023)    furosemide (LASIX) 20 MG tablet Take 20 mg by mouth daily as needed (Patient not taking: Reported on 3/1/2023)     No current facility-administered medications for this visit.

## 2024-06-03 ENCOUNTER — TELEPHONE (OUTPATIENT)
Dept: BEHAVIORAL/MENTAL HEALTH CLINIC | Age: 79
End: 2024-06-03

## 2024-06-03 NOTE — TELEPHONE ENCOUNTER
Patient stated that she jude to ER over weekend due to shortness of breath, weight gain, feeling tired. Stated that it is all affecting her heart. Stated that she is having a lot of anxiety and was taken off of her anxiety medication and stated that it is affecting her heart. Stated while at the hospital they gave her Lasix to help with the fluid. Also stated that they gave her a 2 week supply of Xanax and was told to discuss this with Dr Esteban about getting put back on something for the anxiety. \"This is messing with my heart and it scares me and I am afraid I am going to mess around and have a heart attack. She has to do something for my anxiety\".

## 2024-06-06 NOTE — TELEPHONE ENCOUNTER
Patient has been notified. Have attempted multiple times to reach pharmacy and keep getting put on hold.   Detail Level: Simple

## 2024-07-18 ENCOUNTER — OFFICE VISIT (OUTPATIENT)
Dept: BEHAVIORAL/MENTAL HEALTH CLINIC | Age: 79
End: 2024-07-18
Payer: MEDICARE

## 2024-07-18 VITALS
OXYGEN SATURATION: 95 % | WEIGHT: 179 LBS | TEMPERATURE: 97.1 F | DIASTOLIC BLOOD PRESSURE: 55 MMHG | BODY MASS INDEX: 31.71 KG/M2 | SYSTOLIC BLOOD PRESSURE: 128 MMHG | HEART RATE: 74 BPM | HEIGHT: 63 IN

## 2024-07-18 DIAGNOSIS — F51.01 PRIMARY INSOMNIA: ICD-10-CM

## 2024-07-18 DIAGNOSIS — F33.0 MILD EPISODE OF RECURRENT MAJOR DEPRESSIVE DISORDER (HCC): Primary | ICD-10-CM

## 2024-07-18 DIAGNOSIS — Z79.899 HIGH RISK MEDICATIONS (NOT ANTICOAGULANTS) LONG-TERM USE: ICD-10-CM

## 2024-07-18 DIAGNOSIS — F41.1 GENERALIZED ANXIETY DISORDER: ICD-10-CM

## 2024-07-18 DIAGNOSIS — G89.4 CHRONIC PAIN DISORDER: ICD-10-CM

## 2024-07-18 PROCEDURE — 3078F DIAST BP <80 MM HG: CPT | Performed by: PSYCHIATRY & NEUROLOGY

## 2024-07-18 PROCEDURE — 99214 OFFICE O/P EST MOD 30 MIN: CPT | Performed by: PSYCHIATRY & NEUROLOGY

## 2024-07-18 PROCEDURE — 3074F SYST BP LT 130 MM HG: CPT | Performed by: PSYCHIATRY & NEUROLOGY

## 2024-07-18 PROCEDURE — 1036F TOBACCO NON-USER: CPT | Performed by: PSYCHIATRY & NEUROLOGY

## 2024-07-18 PROCEDURE — 1090F PRES/ABSN URINE INCON ASSESS: CPT | Performed by: PSYCHIATRY & NEUROLOGY

## 2024-07-18 PROCEDURE — G8417 CALC BMI ABV UP PARAM F/U: HCPCS | Performed by: PSYCHIATRY & NEUROLOGY

## 2024-07-18 PROCEDURE — G8400 PT W/DXA NO RESULTS DOC: HCPCS | Performed by: PSYCHIATRY & NEUROLOGY

## 2024-07-18 PROCEDURE — G8427 DOCREV CUR MEDS BY ELIG CLIN: HCPCS | Performed by: PSYCHIATRY & NEUROLOGY

## 2024-07-18 PROCEDURE — 1123F ACP DISCUSS/DSCN MKR DOCD: CPT | Performed by: PSYCHIATRY & NEUROLOGY

## 2024-07-18 RX ORDER — FLUOXETINE 10 MG/1
10 CAPSULE ORAL EVERY MORNING
Qty: 90 CAPSULE | Refills: 3 | Status: SHIPPED | OUTPATIENT
Start: 2024-07-18

## 2024-07-18 RX ORDER — UREA 10 %
10 LOTION (ML) TOPICAL NIGHTLY
Qty: 60 TABLET | Refills: 3 | Status: SHIPPED | OUTPATIENT
Start: 2024-07-18

## 2024-07-18 RX ORDER — ALPRAZOLAM 0.25 MG/1
0.25 TABLET ORAL DAILY
Status: CANCELLED | OUTPATIENT
Start: 2024-07-18

## 2024-07-18 RX ORDER — ALPRAZOLAM 0.25 MG/1
0.25 TABLET ORAL DAILY
COMMUNITY
Start: 2024-06-01 | End: 2024-07-18 | Stop reason: SDUPTHER

## 2024-07-18 RX ORDER — ALPRAZOLAM 0.25 MG/1
0.25 TABLET ORAL
Qty: 15 TABLET | Refills: 2 | Status: SHIPPED | OUTPATIENT
Start: 2024-07-18 | End: 2024-10-16

## 2024-07-18 RX ORDER — MIRTAZAPINE 45 MG/1
45 TABLET, FILM COATED ORAL NIGHTLY
Qty: 30 TABLET | Refills: 3 | Status: SHIPPED | OUTPATIENT
Start: 2024-07-18

## 2024-07-18 RX ORDER — AMITRIPTYLINE HYDROCHLORIDE 25 MG/1
25 TABLET, FILM COATED ORAL NIGHTLY
Qty: 30 TABLET | Refills: 3 | Status: CANCELLED | OUTPATIENT
Start: 2024-07-18

## 2024-07-18 RX ORDER — BUSPIRONE HYDROCHLORIDE 10 MG/1
20 TABLET ORAL 3 TIMES DAILY
Qty: 180 TABLET | Refills: 3 | Status: SHIPPED | OUTPATIENT
Start: 2024-07-18

## 2024-07-18 ASSESSMENT — ANXIETY QUESTIONNAIRES
5. BEING SO RESTLESS THAT IT IS HARD TO SIT STILL: NOT AT ALL
IF YOU CHECKED OFF ANY PROBLEMS ON THIS QUESTIONNAIRE, HOW DIFFICULT HAVE THESE PROBLEMS MADE IT FOR YOU TO DO YOUR WORK, TAKE CARE OF THINGS AT HOME, OR GET ALONG WITH OTHER PEOPLE: SOMEWHAT DIFFICULT
4. TROUBLE RELAXING: MORE THAN HALF THE DAYS
GAD7 TOTAL SCORE: 10
1. FEELING NERVOUS, ANXIOUS, OR ON EDGE: MORE THAN HALF THE DAYS
3. WORRYING TOO MUCH ABOUT DIFFERENT THINGS: MORE THAN HALF THE DAYS
6. BECOMING EASILY ANNOYED OR IRRITABLE: MORE THAN HALF THE DAYS
2. NOT BEING ABLE TO STOP OR CONTROL WORRYING: MORE THAN HALF THE DAYS
7. FEELING AFRAID AS IF SOMETHING AWFUL MIGHT HAPPEN: NOT AT ALL

## 2024-07-18 ASSESSMENT — PATIENT HEALTH QUESTIONNAIRE - PHQ9
SUM OF ALL RESPONSES TO PHQ9 QUESTIONS 1 & 2: 4
1. LITTLE INTEREST OR PLEASURE IN DOING THINGS: MORE THAN HALF THE DAYS
10. IF YOU CHECKED OFF ANY PROBLEMS, HOW DIFFICULT HAVE THESE PROBLEMS MADE IT FOR YOU TO DO YOUR WORK, TAKE CARE OF THINGS AT HOME, OR GET ALONG WITH OTHER PEOPLE: SOMEWHAT DIFFICULT
2. FEELING DOWN, DEPRESSED OR HOPELESS: MORE THAN HALF THE DAYS
SUM OF ALL RESPONSES TO PHQ QUESTIONS 1-9: 12
7. TROUBLE CONCENTRATING ON THINGS, SUCH AS READING THE NEWSPAPER OR WATCHING TELEVISION: MORE THAN HALF THE DAYS
3. TROUBLE FALLING OR STAYING ASLEEP: SEVERAL DAYS
SUM OF ALL RESPONSES TO PHQ QUESTIONS 1-9: 12
9. THOUGHTS THAT YOU WOULD BE BETTER OFF DEAD, OR OF HURTING YOURSELF: NOT AT ALL
4. FEELING TIRED OR HAVING LITTLE ENERGY: SEVERAL DAYS
8. MOVING OR SPEAKING SO SLOWLY THAT OTHER PEOPLE COULD HAVE NOTICED. OR THE OPPOSITE, BEING SO FIGETY OR RESTLESS THAT YOU HAVE BEEN MOVING AROUND A LOT MORE THAN USUAL: NOT AT ALL
5. POOR APPETITE OR OVEREATING: MORE THAN HALF THE DAYS
6. FEELING BAD ABOUT YOURSELF - OR THAT YOU ARE A FAILURE OR HAVE LET YOURSELF OR YOUR FAMILY DOWN: MORE THAN HALF THE DAYS

## 2024-07-18 NOTE — PROGRESS NOTES
Score 10 8 5   How difficult have these problems made it for you to do your work, take care of things at home, or get along with other people? Somewhat difficult Not difficult at all            Chief complaint:  Pt says she is still depressed and anxious.    Subjective:  Seen for follow-up.  States still depressed and anxious.  Not sleeping well.  States having panic attacks and had to go to the emergency when she went off of the Xanax.  She was started back on it.  Agrees to take 1 every other day.  On Norco's for pain.  Denies any more falls.  States her son lives with her and helps take care of her.  He measures her blood pressure and takes her for doctors appointments.  He brought her for her appointment today.  States the physical therapy has stopped coming home but the nurse still comes.  States has not been sleeping well.  Gets 1/2 to 2 hours of sleep.  Will stop the amitriptyline and increase the dose of Remeron to 45 mg daily.  Supportive psychotherapy provided.  Patient denies suicidal ideations or homicidal ideation.  Denies symptoms of psychosis.  Fall precautions discussed with her.      Patient Active Problem List   Diagnosis    Recurrent major depressive disorder, in partial remission (HCC)    Chronic prescription benzodiazepine use    Chronic back pain    ALLEN (acute kidney injury) (HCC)    Generalized anxiety disorder    Chronic diastolic CHF (congestive heart failure) (HCC)    Late onset Alzheimer dementia (HCC)    Pulmonary hypertension (HCC)    Essential hypertension    Acute hypoxemic respiratory failure (HCC)    History of shoulder surgery    Hypomagnesemia    Neuropathy    Dementia with behavioral disturbance (HCC)    History of atrial fibrillation       Denies palpitation,SOB, Chest pain, headaches. In no acute distress.     BP (!) 128/55 (Site: Left Upper Arm, Position: Sitting, Cuff Size: Large Adult)   Pulse 74   Temp 97.1 °F (36.2 °C) (Temporal)   Ht 1.6 m (5' 3\")   Wt 81.2 kg (179 lb)

## 2024-09-09 ENCOUNTER — TELEPHONE (OUTPATIENT)
Dept: BEHAVIORAL/MENTAL HEALTH CLINIC | Age: 79
End: 2024-09-09

## 2024-10-29 ENCOUNTER — OFFICE VISIT (OUTPATIENT)
Dept: BEHAVIORAL/MENTAL HEALTH CLINIC | Age: 79
End: 2024-10-29
Payer: MEDICARE

## 2024-10-29 VITALS
WEIGHT: 169 LBS | OXYGEN SATURATION: 95 % | BODY MASS INDEX: 29.95 KG/M2 | HEART RATE: 53 BPM | TEMPERATURE: 98 F | DIASTOLIC BLOOD PRESSURE: 80 MMHG | HEIGHT: 63 IN | SYSTOLIC BLOOD PRESSURE: 132 MMHG

## 2024-10-29 DIAGNOSIS — Z79.899 HIGH RISK MEDICATIONS (NOT ANTICOAGULANTS) LONG-TERM USE: ICD-10-CM

## 2024-10-29 DIAGNOSIS — F51.01 PRIMARY INSOMNIA: ICD-10-CM

## 2024-10-29 DIAGNOSIS — F41.1 GENERALIZED ANXIETY DISORDER: ICD-10-CM

## 2024-10-29 DIAGNOSIS — F33.41 RECURRENT MAJOR DEPRESSIVE DISORDER, IN PARTIAL REMISSION (HCC): Primary | ICD-10-CM

## 2024-10-29 DIAGNOSIS — G89.4 CHRONIC PAIN DISORDER: ICD-10-CM

## 2024-10-29 PROCEDURE — 1036F TOBACCO NON-USER: CPT | Performed by: PSYCHIATRY & NEUROLOGY

## 2024-10-29 PROCEDURE — G8417 CALC BMI ABV UP PARAM F/U: HCPCS | Performed by: PSYCHIATRY & NEUROLOGY

## 2024-10-29 PROCEDURE — 1090F PRES/ABSN URINE INCON ASSESS: CPT | Performed by: PSYCHIATRY & NEUROLOGY

## 2024-10-29 PROCEDURE — 1123F ACP DISCUSS/DSCN MKR DOCD: CPT | Performed by: PSYCHIATRY & NEUROLOGY

## 2024-10-29 PROCEDURE — 99214 OFFICE O/P EST MOD 30 MIN: CPT | Performed by: PSYCHIATRY & NEUROLOGY

## 2024-10-29 PROCEDURE — 1159F MED LIST DOCD IN RCRD: CPT | Performed by: PSYCHIATRY & NEUROLOGY

## 2024-10-29 PROCEDURE — 3079F DIAST BP 80-89 MM HG: CPT | Performed by: PSYCHIATRY & NEUROLOGY

## 2024-10-29 PROCEDURE — G8427 DOCREV CUR MEDS BY ELIG CLIN: HCPCS | Performed by: PSYCHIATRY & NEUROLOGY

## 2024-10-29 PROCEDURE — G8484 FLU IMMUNIZE NO ADMIN: HCPCS | Performed by: PSYCHIATRY & NEUROLOGY

## 2024-10-29 PROCEDURE — 3075F SYST BP GE 130 - 139MM HG: CPT | Performed by: PSYCHIATRY & NEUROLOGY

## 2024-10-29 PROCEDURE — G8400 PT W/DXA NO RESULTS DOC: HCPCS | Performed by: PSYCHIATRY & NEUROLOGY

## 2024-10-29 RX ORDER — BUSPIRONE HYDROCHLORIDE 10 MG/1
20 TABLET ORAL 3 TIMES DAILY
Qty: 180 TABLET | Refills: 3 | Status: SHIPPED | OUTPATIENT
Start: 2024-10-29

## 2024-10-29 RX ORDER — QUETIAPINE FUMARATE 25 MG/1
12.5-25 TABLET, FILM COATED ORAL
Qty: 30 TABLET | Refills: 2 | Status: SHIPPED | OUTPATIENT
Start: 2024-10-29

## 2024-10-29 RX ORDER — MIRTAZAPINE 30 MG/1
30 TABLET, FILM COATED ORAL NIGHTLY
Qty: 30 TABLET | Refills: 3 | Status: SHIPPED | OUTPATIENT
Start: 2024-10-29

## 2024-10-29 RX ORDER — FLUOXETINE 10 MG/1
10 CAPSULE ORAL EVERY MORNING
Qty: 90 CAPSULE | Refills: 3 | Status: SHIPPED | OUTPATIENT
Start: 2024-10-29

## 2024-10-29 ASSESSMENT — PATIENT HEALTH QUESTIONNAIRE - PHQ9
6. FEELING BAD ABOUT YOURSELF - OR THAT YOU ARE A FAILURE OR HAVE LET YOURSELF OR YOUR FAMILY DOWN: NOT AT ALL
SUM OF ALL RESPONSES TO PHQ QUESTIONS 1-9: 6
8. MOVING OR SPEAKING SO SLOWLY THAT OTHER PEOPLE COULD HAVE NOTICED. OR THE OPPOSITE, BEING SO FIGETY OR RESTLESS THAT YOU HAVE BEEN MOVING AROUND A LOT MORE THAN USUAL: NOT AT ALL
9. THOUGHTS THAT YOU WOULD BE BETTER OFF DEAD, OR OF HURTING YOURSELF: NOT AT ALL
2. FEELING DOWN, DEPRESSED OR HOPELESS: SEVERAL DAYS
5. POOR APPETITE OR OVEREATING: NOT AT ALL
10. IF YOU CHECKED OFF ANY PROBLEMS, HOW DIFFICULT HAVE THESE PROBLEMS MADE IT FOR YOU TO DO YOUR WORK, TAKE CARE OF THINGS AT HOME, OR GET ALONG WITH OTHER PEOPLE: NOT DIFFICULT AT ALL
4. FEELING TIRED OR HAVING LITTLE ENERGY: MORE THAN HALF THE DAYS
SUM OF ALL RESPONSES TO PHQ QUESTIONS 1-9: 6
7. TROUBLE CONCENTRATING ON THINGS, SUCH AS READING THE NEWSPAPER OR WATCHING TELEVISION: NOT AT ALL
SUM OF ALL RESPONSES TO PHQ QUESTIONS 1-9: 6
3. TROUBLE FALLING OR STAYING ASLEEP: NEARLY EVERY DAY
SUM OF ALL RESPONSES TO PHQ QUESTIONS 1-9: 6

## 2024-10-29 ASSESSMENT — ANXIETY QUESTIONNAIRES
7. FEELING AFRAID AS IF SOMETHING AWFUL MIGHT HAPPEN: NOT AT ALL
4. TROUBLE RELAXING: SEVERAL DAYS
3. WORRYING TOO MUCH ABOUT DIFFERENT THINGS: SEVERAL DAYS
GAD7 TOTAL SCORE: 6
1. FEELING NERVOUS, ANXIOUS, OR ON EDGE: SEVERAL DAYS
5. BEING SO RESTLESS THAT IT IS HARD TO SIT STILL: SEVERAL DAYS
2. NOT BEING ABLE TO STOP OR CONTROL WORRYING: SEVERAL DAYS
IF YOU CHECKED OFF ANY PROBLEMS ON THIS QUESTIONNAIRE, HOW DIFFICULT HAVE THESE PROBLEMS MADE IT FOR YOU TO DO YOUR WORK, TAKE CARE OF THINGS AT HOME, OR GET ALONG WITH OTHER PEOPLE: SOMEWHAT DIFFICULT
6. BECOMING EASILY ANNOYED OR IRRITABLE: SEVERAL DAYS

## 2024-10-29 NOTE — PROGRESS NOTES
F51.01       4. Chronic pain disorder  G89.4       5. High risk medications (not anticoagulants) long-term use  Z79.899           TREATMENT GOALS:  Symptom reduction, Medication adherence, maintain therapeutic gains    LABS/IMAGING:    []  Ordered [x]  Reviewed []  New Labs Ordered:     LAB  WBC   Date/Time Value Ref Range Status   10/29/2021 07:16 PM 12.3 (H) 4.3 - 11.1 K/uL Final     Hemoglobin   Date/Time Value Ref Range Status   10/29/2021 07:16 PM 10.1 (L) 11.7 - 15.4 g/dL Final     Hematocrit   Date/Time Value Ref Range Status   10/29/2021 07:16 PM 32.1 (L) 35.8 - 46.3 % Final     Platelets   Date/Time Value Ref Range Status   10/29/2021 07:16  150 - 450 K/uL Final     Sodium   Date/Time Value Ref Range Status   11/02/2021 05:54  136 - 145 mmol/L Final     Potassium   Date/Time Value Ref Range Status   11/02/2021 05:54 AM 3.9 3.5 - 5.1 mmol/L Final     Chloride   Date/Time Value Ref Range Status   11/02/2021 05:54  98 - 107 mmol/L Final     CO2   Date/Time Value Ref Range Status   11/02/2021 05:54 AM 35 (H) 21 - 32 mmol/L Final     BUN   Date/Time Value Ref Range Status   11/02/2021 05:54 AM 27 (H) 8 - 23 MG/DL Final     Magnesium   Date/Time Value Ref Range Status   10/31/2021 03:56 AM 2.7 (H) 1.8 - 2.4 mg/dL Final       Please refer to the lab tab in the epic and care everywhere for the most recent lab results.    Plan:     [x]  Medication ordered: BuSpar, Prozac,  mirtazapine, Seroquel, melatonin to target depression, anxiety, insomnia,    [x]  Medication education/counseling provided  Medication dosage and time to take, purpose/expected benefits/risks, common side effects, lab monitoring required and reason, expected length of treatment, risk of no treatment, financial availability discussed. Educated patient on  side effects/risks/benefits of meds including metabolic syndrome risk, EPS, increased risk of cerebrovascular accidents and mortality in elderly, akathisia,  cardiac

## 2024-11-25 ENCOUNTER — TELEMEDICINE (OUTPATIENT)
Dept: BEHAVIORAL/MENTAL HEALTH CLINIC | Age: 79
End: 2024-11-25
Payer: MEDICARE

## 2024-11-25 DIAGNOSIS — F51.01 PRIMARY INSOMNIA: ICD-10-CM

## 2024-11-25 DIAGNOSIS — Z79.899 HIGH RISK MEDICATIONS (NOT ANTICOAGULANTS) LONG-TERM USE: ICD-10-CM

## 2024-11-25 DIAGNOSIS — G89.4 CHRONIC PAIN DISORDER: ICD-10-CM

## 2024-11-25 DIAGNOSIS — F33.42 RECURRENT MAJOR DEPRESSIVE DISORDER, IN FULL REMISSION (HCC): Primary | ICD-10-CM

## 2024-11-25 DIAGNOSIS — F41.1 GENERALIZED ANXIETY DISORDER: ICD-10-CM

## 2024-11-25 PROCEDURE — 99214 OFFICE O/P EST MOD 30 MIN: CPT | Performed by: PSYCHIATRY & NEUROLOGY

## 2024-11-25 PROCEDURE — G8427 DOCREV CUR MEDS BY ELIG CLIN: HCPCS | Performed by: PSYCHIATRY & NEUROLOGY

## 2024-11-25 PROCEDURE — 1090F PRES/ABSN URINE INCON ASSESS: CPT | Performed by: PSYCHIATRY & NEUROLOGY

## 2024-11-25 PROCEDURE — 1123F ACP DISCUSS/DSCN MKR DOCD: CPT | Performed by: PSYCHIATRY & NEUROLOGY

## 2024-11-25 PROCEDURE — G8400 PT W/DXA NO RESULTS DOC: HCPCS | Performed by: PSYCHIATRY & NEUROLOGY

## 2024-11-25 PROCEDURE — 1159F MED LIST DOCD IN RCRD: CPT | Performed by: PSYCHIATRY & NEUROLOGY

## 2024-11-25 RX ORDER — BUSPIRONE HYDROCHLORIDE 10 MG/1
20 TABLET ORAL 3 TIMES DAILY
Qty: 180 TABLET | Refills: 3 | Status: CANCELLED | OUTPATIENT
Start: 2024-11-25

## 2024-11-25 RX ORDER — QUETIAPINE FUMARATE 25 MG/1
12.5-25 TABLET, FILM COATED ORAL
Qty: 30 TABLET | Refills: 2 | Status: CANCELLED | OUTPATIENT
Start: 2024-11-25

## 2024-11-25 RX ORDER — FLUOXETINE 10 MG/1
10 CAPSULE ORAL EVERY MORNING
Qty: 90 CAPSULE | Refills: 3 | Status: CANCELLED | OUTPATIENT
Start: 2024-11-25

## 2024-11-25 RX ORDER — MIRTAZAPINE 30 MG/1
30 TABLET, FILM COATED ORAL NIGHTLY
Qty: 30 TABLET | Refills: 3 | Status: CANCELLED | OUTPATIENT
Start: 2024-11-25

## 2024-11-25 ASSESSMENT — PATIENT HEALTH QUESTIONNAIRE - PHQ9
9. THOUGHTS THAT YOU WOULD BE BETTER OFF DEAD, OR OF HURTING YOURSELF: NOT AT ALL
5. POOR APPETITE OR OVEREATING: NOT AT ALL
7. TROUBLE CONCENTRATING ON THINGS, SUCH AS READING THE NEWSPAPER OR WATCHING TELEVISION: NOT AT ALL
4. FEELING TIRED OR HAVING LITTLE ENERGY: NOT AT ALL
SUM OF ALL RESPONSES TO PHQ QUESTIONS 1-9: 3
1. LITTLE INTEREST OR PLEASURE IN DOING THINGS: NOT AT ALL
3. TROUBLE FALLING OR STAYING ASLEEP: NEARLY EVERY DAY
8. MOVING OR SPEAKING SO SLOWLY THAT OTHER PEOPLE COULD HAVE NOTICED. OR THE OPPOSITE, BEING SO FIGETY OR RESTLESS THAT YOU HAVE BEEN MOVING AROUND A LOT MORE THAN USUAL: NOT AT ALL
10. IF YOU CHECKED OFF ANY PROBLEMS, HOW DIFFICULT HAVE THESE PROBLEMS MADE IT FOR YOU TO DO YOUR WORK, TAKE CARE OF THINGS AT HOME, OR GET ALONG WITH OTHER PEOPLE: NOT DIFFICULT AT ALL
SUM OF ALL RESPONSES TO PHQ QUESTIONS 1-9: 3
2. FEELING DOWN, DEPRESSED OR HOPELESS: NOT AT ALL
6. FEELING BAD ABOUT YOURSELF - OR THAT YOU ARE A FAILURE OR HAVE LET YOURSELF OR YOUR FAMILY DOWN: NOT AT ALL
SUM OF ALL RESPONSES TO PHQ9 QUESTIONS 1 & 2: 0

## 2024-11-25 ASSESSMENT — ANXIETY QUESTIONNAIRES
4. TROUBLE RELAXING: NOT AT ALL
1. FEELING NERVOUS, ANXIOUS, OR ON EDGE: NOT AT ALL
7. FEELING AFRAID AS IF SOMETHING AWFUL MIGHT HAPPEN: NOT AT ALL
IF YOU CHECKED OFF ANY PROBLEMS ON THIS QUESTIONNAIRE, HOW DIFFICULT HAVE THESE PROBLEMS MADE IT FOR YOU TO DO YOUR WORK, TAKE CARE OF THINGS AT HOME, OR GET ALONG WITH OTHER PEOPLE: NOT DIFFICULT AT ALL
5. BEING SO RESTLESS THAT IT IS HARD TO SIT STILL: NOT AT ALL
6. BECOMING EASILY ANNOYED OR IRRITABLE: NOT AT ALL
3. WORRYING TOO MUCH ABOUT DIFFERENT THINGS: NOT AT ALL
2. NOT BEING ABLE TO STOP OR CONTROL WORRYING: NOT AT ALL
GAD7 TOTAL SCORE: 0

## 2024-11-25 NOTE — PROGRESS NOTES
[facility] prescription monitoring program, so that the prescription(s) for a  controlled substance can be given.    Recommendations and Referrals:    Follow up with : MD, requires monitoring of response to medication, requires monitoring of medication side effects.    Time until next PMA: 3 months    Follow up with Mental Health Clinicians recommended for : psychotherapy interventions,  monitoring to prevent decompensation /hospitalization, monitoring to maintain therapeutic gains, monitoring symptoms (resolving and controlled), to improve level of functioning,         Psychotherapy note:                                __10_ Minutes of psychotherapy     [x]  Supportive psychotherapy provided to improve self-esteem, psychological functioning, and adaptive skills. Patient discussed certain situational and personal stressors ongoing in her life at this time, weight management d/w the patient. Sleep hygiene d/w patient. Patient allowed to vent out her emotions.      []  Disposition planning      []  Dangerous and will not contract for safety in the community    **Pateint has been notified: They are to call 911 or go to their nearest E.R. if they are experiencing a medical emergency or suicidal ideations/homicidal ideations.**  All ancillary documentation entered reviewed by provider.      PLEASE NOTE:  This document has been produced in part or whole using voice recognition software. Proofread however unrecognized errors in transcription may be present.    MD Benita Pham, was evaluated through a synchronous (real-time) audio-video encounter. The patient (or guardian if applicable) is aware that this is a billable service, which includes applicable co-pays. This Virtual Visit was conducted with patient's (and/or legal guardian's) consent. Patient identification was verified, and a caregiver was present when appropriate.   The patient was located at Home: [address] [place] [address]

## 2024-12-05 ENCOUNTER — TELEPHONE (OUTPATIENT)
Dept: BEHAVIORAL/MENTAL HEALTH CLINIC | Age: 79
End: 2024-12-05

## 2024-12-05 NOTE — TELEPHONE ENCOUNTER
This medication is not on patient's medication list. I will need to discuss this with the patient before a message can be sent to Dr Esteban for review since she has not been prescribing this.

## 2024-12-06 DIAGNOSIS — F41.1 GENERALIZED ANXIETY DISORDER: ICD-10-CM

## 2024-12-06 RX ORDER — ALPRAZOLAM 0.25 MG/1
0.25 TABLET ORAL DAILY PRN
Qty: 30 TABLET | Refills: 1 | Status: SHIPPED | OUTPATIENT
Start: 2024-12-06 | End: 2025-04-05

## 2025-01-30 ENCOUNTER — TELEPHONE (OUTPATIENT)
Dept: ORTHOPEDIC SURGERY | Age: 80
End: 2025-01-30

## 2025-01-30 NOTE — TELEPHONE ENCOUNTER
She has an appointment tomorrow with [facility] and her insurance doesn't go into effect until the 1st. She is wondering how much she will pay or how much he will charge if she is self pay. I wasn't sure who could answer this. Do you know?

## 2025-03-10 ENCOUNTER — OFFICE VISIT (OUTPATIENT)
Dept: ORTHOPEDIC SURGERY | Age: 80
End: 2025-03-10
Payer: MEDICARE

## 2025-03-10 DIAGNOSIS — M47.816 LUMBAR SPONDYLOSIS: ICD-10-CM

## 2025-03-10 DIAGNOSIS — Z96.642 S/P TOTAL LEFT HIP ARTHROPLASTY: ICD-10-CM

## 2025-03-10 DIAGNOSIS — Z96.653 STATUS POST TOTAL BILATERAL KNEE REPLACEMENT: Primary | ICD-10-CM

## 2025-03-10 PROCEDURE — 1123F ACP DISCUSS/DSCN MKR DOCD: CPT | Performed by: PHYSICIAN ASSISTANT

## 2025-03-10 PROCEDURE — 1036F TOBACCO NON-USER: CPT | Performed by: ORTHOPAEDIC SURGERY

## 2025-03-10 PROCEDURE — 1090F PRES/ABSN URINE INCON ASSESS: CPT | Performed by: PHYSICIAN ASSISTANT

## 2025-03-10 PROCEDURE — G8428 CUR MEDS NOT DOCUMENT: HCPCS | Performed by: PHYSICIAN ASSISTANT

## 2025-03-10 PROCEDURE — G8400 PT W/DXA NO RESULTS DOC: HCPCS | Performed by: PHYSICIAN ASSISTANT

## 2025-03-10 PROCEDURE — G8417 CALC BMI ABV UP PARAM F/U: HCPCS | Performed by: PHYSICIAN ASSISTANT

## 2025-03-10 PROCEDURE — 99205 OFFICE O/P NEW HI 60 MIN: CPT | Performed by: PHYSICIAN ASSISTANT

## 2025-03-10 NOTE — PROGRESS NOTES
Name: Benita Edmonds  YOB: 1945  Gender: female  MRN: [id number]      CHIEF COMPLAINT: Follow-up (Left TAJ 3/11/21  will xray today with L-spine and left knee/Left TKA Dr Cooper 2011/)      HPI:   The left hip pain has been present for a couple of months.  She is 4 years status post left TAJ and unfortunately is experienced 3 hip dislocations with the last being a little over 1 month ago with successful reduction in the ED at [facility] in [place].  She states that she was simply sleeping and pivoting on her left leg when she experienced this last dislocation.  It does not hurt at night when sleeping.  There was not an acute injury to the hip or knee.  The pain is located over the side of the left hip.  It hurts to walk and pain worsens with increased distance.  The pain does radiate to the right knee at times.  She had left TKA by Dr. Cooper at [facility] in 2011.     Numbness and tingling are not noted.  The patient is not really having difficulty putting socks and shoes on.        Treatment so far has been anti-inflammatory medications.    Review of Systems  As per HPI.  Pertinent positives and negatives are addressed with the patient, particularly those related to musculoskeletal concerns.  Non-orthopaedic concerns were referred back to the primary care physician.      PMFSH:    Current Outpatient Medications:     ALPRAZolam (XANAX) 0.25 MG tablet, Take 1 tablet by mouth daily as needed for Anxiety for up to 120 days. Max Daily Amount: 0.25 mg, Disp: 30 tablet, Rfl: 1    mirtazapine (REMERON) 30 MG tablet, Take 1 tablet by mouth nightly, Disp: 30 tablet, Rfl: 3    melatonin 5 MG TABS tablet, Take 2 tablets by mouth nightly, Disp: 60 tablet, Rfl: 3    FLUoxetine (PROZAC) 10 MG capsule, Take 1 capsule by mouth every morning, Disp: 90 capsule, Rfl: 3    busPIRone (BUSPAR) 10 MG tablet, Take 2 tablets by mouth 3 times daily, Disp: 180 tablet, Rfl: 3    ascorbic acid (VITAMIN C) 500 MG tablet, Take 1

## 2025-03-18 DIAGNOSIS — M25.552 HIP PAIN, LEFT: ICD-10-CM

## 2025-03-18 DIAGNOSIS — Z96.642 S/P TOTAL LEFT HIP ARTHROPLASTY: Primary | ICD-10-CM

## 2025-03-18 DIAGNOSIS — Z47.32 AFTERCARE FOLLOWING EXPLANTATION OF HIP JOINT PROSTHESIS: ICD-10-CM

## 2025-03-24 ENCOUNTER — TELEPHONE (OUTPATIENT)
Dept: ORTHOPEDIC SURGERY | Age: 80
End: 2025-03-24

## 2025-03-24 NOTE — TELEPHONE ENCOUNTER
Spoke to patients daughter in law. Patient is admitted to rehab and is very sick with several health issues. We are going to cx her surgery for now and she will follow up when she is feeling better.

## (undated) DEVICE — T4 HOOD

## (undated) DEVICE — TOTAL HIP DR KAVOLUS: Brand: MEDLINE INDUSTRIES, INC.

## (undated) DEVICE — SOLUTION IRRIG 3000ML 0.9% SOD CHL FLX CONT 0797208] ICU MEDICAL INC]

## (undated) DEVICE — STOCKINETTE,IMPERVIOUS,12X48,STERILE: Brand: MEDLINE

## (undated) DEVICE — Z DISCONTINUED PER MEDLINE USE 2741944 DRESSING AQUACEL 12 IN SURG W9XL30CM SIL CVR WTRPRF VIR BACT BARR ANTIMIC

## (undated) DEVICE — DRAPE,U/SHT,SPLIT,FILM,60X84,STERILE: Brand: MEDLINE

## (undated) DEVICE — HANDPIECE SET WITH COAXIAL HIGH FLOW TIP AND SUCTION TUBE: Brand: INTERPULSE

## (undated) DEVICE — BIPOLAR SEALER 23-112-1 AQM 6.0: Brand: AQUAMANTYS ®

## (undated) DEVICE — SUTURE MCRYL SZ 2-0 L27IN ABSRB UD CP-1 1 L36MM 1/2 CIR REV Y266H

## (undated) DEVICE — SYR LR LCK 1ML GRAD NSAF 30ML --

## (undated) DEVICE — 3M™ IOBAN™ 2 ANTIMICROBIAL INCISE DRAPE 6651EZ: Brand: IOBAN™ 2

## (undated) DEVICE — GOWN,AURORA,FABRIC-REINFORCED,2XL: Brand: MEDLINE

## (undated) DEVICE — HEWSON SUTURE RETRIEVER: Brand: HEWSON SUTURE RETRIEVER

## (undated) DEVICE — 3M™ STERI-DRAPE™ INSTRUMENT POUCH 1018: Brand: STERI-DRAPE™

## (undated) DEVICE — DRAPE,TOP,102X53,STERILE: Brand: MEDLINE

## (undated) DEVICE — SUTURE PDS II SZ 1 L96IN ABSRB VLT TP-1 L65MM 1/2 CIR Z880G

## (undated) DEVICE — SYR 50ML LR LCK 1ML GRAD NSAF --

## (undated) DEVICE — SOLUTION IV 250ML 0.9% SOD CHL CLR INJ FLX BG CONT PRT CLSR

## (undated) DEVICE — STOCKINETTE TUBE 6X48 -- MEDICHOICE

## (undated) DEVICE — STRYKER PERFORMANCE SERIES SAGITTAL BLADE: Brand: STRYKER PERFORMANCE SERIES

## (undated) DEVICE — DRAPE,HIP,W/POUCHES,STERILE: Brand: MEDLINE

## (undated) DEVICE — 3M™ STERI-DRAPE™ INCISE DRAPE, XL 1051: Brand: STERI-DRAPE™

## (undated) DEVICE — TRAY PREP DRY W/ PREM GLV 2 APPL 6 SPNG 2 UNDPD 1 OVERWRAP

## (undated) DEVICE — SOLUTION IV 1000ML 0.9% SOD CHL

## (undated) DEVICE — (D)PREP SKN CHLRAPRP APPL 26ML -- CONVERT TO ITEM 371833

## (undated) DEVICE — SUTURE ETHBND EXCEL SZ 5 L30IN NONABSORBABLE GRN L40MM V-37 MB66G

## (undated) DEVICE — REM POLYHESIVE ADULT PATIENT RETURN ELECTRODE: Brand: VALLEYLAB